# Patient Record
Sex: MALE | Race: WHITE | HISPANIC OR LATINO | Employment: OTHER | URBAN - METROPOLITAN AREA
[De-identification: names, ages, dates, MRNs, and addresses within clinical notes are randomized per-mention and may not be internally consistent; named-entity substitution may affect disease eponyms.]

---

## 2017-01-04 ENCOUNTER — TELEPHONE (OUTPATIENT)
Dept: GASTROENTEROLOGY | Facility: CLINIC | Age: 62
End: 2017-01-04

## 2017-01-04 NOTE — TELEPHONE ENCOUNTER
----- Message from Torsten Hernández sent at 1/4/2017  8:51 AM CST -----  Contact: Yvonne/Ada Russell called in regarding the attached patient and some additional information she needs regarding the patients surgery that was done on 12/26/16.    Yvonne needs to know if this was inpatient or outpatient, diagnoses, ICD-10 code and procedure and CPT code.    Yvonne's call back number is [phone] [facility] [address]

## 2017-01-05 ENCOUNTER — DOCUMENTATION ONLY (OUTPATIENT)
Dept: FAMILY MEDICINE | Facility: CLINIC | Age: 62
End: 2017-01-05

## 2017-01-05 NOTE — PROGRESS NOTES
Pre-Visit Chart Review  For Appointment Scheduled on 1-6-17    Health Maintenance Due   Topic Date Due    Influenza Vaccine  08/01/2016

## 2017-01-06 ENCOUNTER — CLINICAL SUPPORT (OUTPATIENT)
Dept: INTERNAL MEDICINE | Facility: CLINIC | Age: 62
End: 2017-01-06
Payer: COMMERCIAL

## 2017-01-06 ENCOUNTER — OFFICE VISIT (OUTPATIENT)
Dept: FAMILY MEDICINE | Facility: CLINIC | Age: 62
End: 2017-01-06
Payer: COMMERCIAL

## 2017-01-06 ENCOUNTER — LAB VISIT (OUTPATIENT)
Dept: LAB | Facility: HOSPITAL | Age: 62
End: 2017-01-06
Attending: FAMILY MEDICINE
Payer: COMMERCIAL

## 2017-01-06 VITALS
TEMPERATURE: 98 F | BODY MASS INDEX: 30.87 KG/M2 | RESPIRATION RATE: 24 BRPM | WEIGHT: 240.5 LBS | HEART RATE: 80 BPM | OXYGEN SATURATION: 96 % | HEIGHT: 74 IN | DIASTOLIC BLOOD PRESSURE: 76 MMHG | SYSTOLIC BLOOD PRESSURE: 113 MMHG

## 2017-01-06 DIAGNOSIS — J06.9 UPPER RESPIRATORY TRACT INFECTION, UNSPECIFIED TYPE: ICD-10-CM

## 2017-01-06 DIAGNOSIS — K75.9 HEPATITIS: ICD-10-CM

## 2017-01-06 DIAGNOSIS — K83.09 CHOLANGITIS: ICD-10-CM

## 2017-01-06 DIAGNOSIS — K80.50 CHOLEDOCHOLITHIASIS: ICD-10-CM

## 2017-01-06 DIAGNOSIS — A41.51 SEPSIS DUE TO ESCHERICHIA COLI: ICD-10-CM

## 2017-01-06 DIAGNOSIS — K80.50 CHOLEDOCHOLITHIASIS: Primary | ICD-10-CM

## 2017-01-06 LAB
ALBUMIN SERPL BCP-MCNC: 3.7 G/DL
ALP SERPL-CCNC: 78 U/L
ALT SERPL W/O P-5'-P-CCNC: 135 U/L
AST SERPL-CCNC: 71 U/L
BASOPHILS # BLD AUTO: 0.01 K/UL
BASOPHILS NFR BLD: 0.2 %
BILIRUB DIRECT SERPL-MCNC: 0.4 MG/DL
BILIRUB SERPL-MCNC: 0.6 MG/DL
DIFFERENTIAL METHOD: ABNORMAL
EOSINOPHIL # BLD AUTO: 0.1 K/UL
EOSINOPHIL NFR BLD: 1.8 %
ERYTHROCYTE [DISTWIDTH] IN BLOOD BY AUTOMATED COUNT: 14.6 %
HCT VFR BLD AUTO: 45.2 %
HGB BLD-MCNC: 15.4 G/DL
LYMPHOCYTES # BLD AUTO: 2.6 K/UL
LYMPHOCYTES NFR BLD: 57.1 %
MCH RBC QN AUTO: 29.3 PG
MCHC RBC AUTO-ENTMCNC: 34.1 %
MCV RBC AUTO: 86 FL
MONOCYTES # BLD AUTO: 0.4 K/UL
MONOCYTES NFR BLD: 8.1 %
NEUTROPHILS # BLD AUTO: 1.5 K/UL
NEUTROPHILS NFR BLD: 32.8 %
PLATELET # BLD AUTO: 245 K/UL
PMV BLD AUTO: 10.2 FL
PROT SERPL-MCNC: 7.5 G/DL
RBC # BLD AUTO: 5.26 M/UL
WBC # BLD AUTO: 4.55 K/UL

## 2017-01-06 PROCEDURE — 36415 COLL VENOUS BLD VENIPUNCTURE: CPT | Mod: PO

## 2017-01-06 PROCEDURE — 99214 OFFICE O/P EST MOD 30 MIN: CPT | Mod: S$GLB,,, | Performed by: FAMILY MEDICINE

## 2017-01-06 PROCEDURE — 80076 HEPATIC FUNCTION PANEL: CPT

## 2017-01-06 PROCEDURE — 99999 PR PBB SHADOW E&M-EST. PATIENT-LVL III: CPT | Mod: PBBFAC,,, | Performed by: FAMILY MEDICINE

## 2017-01-06 PROCEDURE — 96372 THER/PROPH/DIAG INJ SC/IM: CPT | Mod: S$GLB,,, | Performed by: FAMILY MEDICINE

## 2017-01-06 PROCEDURE — 85025 COMPLETE CBC W/AUTO DIFF WBC: CPT

## 2017-01-06 PROCEDURE — 1159F MED LIST DOCD IN RCRD: CPT | Mod: S$GLB,,, | Performed by: FAMILY MEDICINE

## 2017-01-06 RX ORDER — DOXYCYCLINE HYCLATE 100 MG
100 TABLET ORAL 2 TIMES DAILY
Qty: 20 TABLET | Refills: 0 | Status: SHIPPED | OUTPATIENT
Start: 2017-01-06 | End: 2017-01-16

## 2017-01-06 RX ORDER — FLUTICASONE PROPIONATE 50 MCG
4 SPRAY, SUSPENSION (ML) NASAL DAILY PRN
COMMUNITY
Start: 2016-12-09 | End: 2017-10-06 | Stop reason: SDUPTHER

## 2017-01-06 RX ADMIN — CYANOCOBALAMIN 1000 MCG: 1000 INJECTION, SOLUTION INTRAMUSCULAR; SUBCUTANEOUS at 08:01

## 2017-01-06 NOTE — PROGRESS NOTES
Patient identified by name and  With verbal feedback.  Vitamin B12, 1000 mcg administered on L  Deltoid IM using aseptic technique.  Patient tolerated well, no adverse reactions noted. /mp

## 2017-01-06 NOTE — PROGRESS NOTES
Subjective:       Patient ID: Ad Lyons is a 61 y.o. male.    Chief Complaint: Transitional Care    HPI Comments: 61 year old male in for follow up of a brief hospital stay when he presented with several days of nausea, vomiting, fever and abdominal pain.  He was admitted December 26-28 at [facility] and found to have choledocholithiasis and cholangitis with acute hepatitis and sepsis with positive blood culture for E.coli some 12 years after cholecystectomy.  The multiple stones were removed via ERCP, he was treated with IV zosyn and transitioned to po cipro and is here for follow up.  In the interim he caught a cold from his wife who developed bronchitis and was treated with doxycycline.  He has head and chest congestion and a cough with clear/white sputum.  He has no fever or chills.  He is leaving on a one week trip to [place] in three days.    Past Medical History:    Mills's esophagus                                           DDD (degenerative disc disease), cervical                     Ear ringing                                                   Gallbladder disease                                           Gastric ulcer with perforation                                GERD (gastroesophageal reflux disease)                        Kidney stone                                                  Vertigo                                                       Past Surgical History:    CHOLECYSTECTOMY                                                VASECTOMY                                                      ADENOIDECTOMY                                                  TONSILLECTOMY                                                  ESOPHAGOGASTRODUODENOSCOPY                       1/2013          Comment:Dr Hughes    COLONOSCOPY W/ POLYPECTOMY                       1/2013          Comment:Dr Hughes    ERCP                                             12/26/2016      Comment:[facility]  note        Review of Systems   Constitutional: Negative for chills, diaphoresis and fever.   HENT: Positive for congestion, postnasal drip, rhinorrhea and sinus pressure. Negative for ear discharge and ear pain.    Respiratory: Positive for cough. Negative for chest tightness and shortness of breath.    Cardiovascular: Negative for chest pain and palpitations.   Gastrointestinal: Negative for abdominal distention, abdominal pain, anal bleeding, blood in stool, constipation, diarrhea, nausea and vomiting.       Objective:      Physical Exam   Constitutional: He is oriented to person, place, and time. He appears well-developed. No distress.   Good blood pressure control  No fever   HENT:   Head: Normocephalic and atraumatic.   Right Ear: Hearing, external ear and ear canal normal. Tympanic membrane is bulging. Tympanic membrane mobility is abnormal.   Left Ear: Hearing, external ear and ear canal normal. Tympanic membrane is injected, erythematous and bulging. Tympanic membrane mobility is abnormal.   Nose: Mucosal edema and rhinorrhea present.   Mouth/Throat: No oropharyngeal exudate, posterior oropharyngeal edema, posterior oropharyngeal erythema or tonsillar abscesses.   Cardiovascular: Normal rate, regular rhythm and normal heart sounds.  Exam reveals no gallop and no friction rub.    No murmur heard.  Pulmonary/Chest: Effort normal and breath sounds normal. No respiratory distress. He has no wheezes. He has no rales. He exhibits no tenderness.   Abdominal: Soft. Bowel sounds are normal. He exhibits no distension and no mass. There is no hepatosplenomegaly. There is no tenderness. There is no rebound, no guarding, no tenderness at McBurney's point and negative Huertas's sign. No hernia.   Neurological: He is alert and oriented to person, place, and time.   Skin: He is not diaphoretic.   Psychiatric: He has a normal mood and affect. His behavior is normal. Judgment and thought content normal.   Nursing note and  vitals reviewed.      Assessment:       1. Choledocholithiasis    2. Hepatitis    3. Sepsis due to Escherichia coli    4. Upper respiratory tract infection, unspecified type    5. Cholangitis        Plan:       1. Choledocholithiasis  Appears resolved pending lab  - CBC auto differential; Future  - Hepatic function panel; Future    2. Hepatitis  Appears resolved pending lab  - CBC auto differential; Future  - Hepatic function panel; Future    3. Sepsis due to Escherichia coli  Sensitive to all antibiotics tested including cipro  - CBC auto differential; Future  - Hepatic function panel; Future    4. Upper respiratory tract infection, unspecified type  Most likely viral but will be traveling on extended trip, will cover with doxycycline  - doxycycline (VIBRA-TABS) 100 MG tablet; Take 1 tablet (100 mg total) by mouth 2 (two) times daily.  Dispense: 20 tablet; Refill: 0    5. Cholangitis  Appears resolved pending lab, avoid antihistamines

## 2017-01-06 NOTE — PATIENT INSTRUCTIONS
Walking for Fitness  Fitness walking has something for everyone, even people who are already fit. Walking is one of the safest ways to condition your body aerobically. It can boost energy, help you lose weight, and reduce stress.    Physical benefits  · Walking strengthens your heart and lungs, and tones your muscles.  · When walking, your feet land with less impact than in other sports. This reduces chances of muscle, bone, and joint injury.  · Regular walking improves your cholesterol levels and lowers your risk of heart disease. And it helps you control your blood sugar if you have diabetes.  · Walking is a weight-bearing activity, which helps maintain bone density. This can help prevent osteoporosis.  Personal rewards  · Taking walks can help you relax and manage stress. And fitness walking may make you feel better about yourself.  · Walking can help you sleep better at night and make you less likely to be depressed.  · Regular walking may help maintain your memory as you get older.  · Walking is a great way to spend extra time with friends and family members. Be sure to invite your dog along!  Q&A about fitness walking  Q: Will walking keep me fit?  A: Yes. Regular walking at the right pace gives you all the benefits of other aerobic activities, such as jogging and swimming.  Q: Will walking help me lose weight and keep it off?  A: Yes. Per mile, walking can burn as many calories as jogging. Your health care provider can help work walking into your weight-loss plan.  Q: Is walking safe for my health?  A: Yes. Walking is safe if you have high blood pressure, diabetes, heart disease, or other conditions. Talk to your health care provider before you start.  © 9131-2422 [employer]. [address] [place] [address], [place] [address]. All rights reserved. This information is not intended as a substitute for professional medical care. Always follow your healthcare professional's instructions.

## 2017-01-10 ENCOUNTER — PATIENT MESSAGE (OUTPATIENT)
Dept: FAMILY MEDICINE | Facility: CLINIC | Age: 62
End: 2017-01-10

## 2017-01-26 ENCOUNTER — LAB VISIT (OUTPATIENT)
Dept: LAB | Facility: HOSPITAL | Age: 62
End: 2017-01-26
Attending: INTERNAL MEDICINE
Payer: COMMERCIAL

## 2017-01-26 ENCOUNTER — OFFICE VISIT (OUTPATIENT)
Dept: GASTROENTEROLOGY | Facility: CLINIC | Age: 62
End: 2017-01-26
Payer: COMMERCIAL

## 2017-01-26 VITALS — WEIGHT: 244.69 LBS | HEIGHT: 74 IN | BODY MASS INDEX: 31.4 KG/M2

## 2017-01-26 DIAGNOSIS — R79.89 ABNORMAL LFTS: Primary | ICD-10-CM

## 2017-01-26 DIAGNOSIS — R79.89 ABNORMAL LFTS: ICD-10-CM

## 2017-01-26 LAB
ALBUMIN SERPL BCP-MCNC: 4.1 G/DL
ALP SERPL-CCNC: 72 U/L
ALT SERPL W/O P-5'-P-CCNC: 48 U/L
AST SERPL-CCNC: 28 U/L
BILIRUB DIRECT SERPL-MCNC: 0.3 MG/DL
BILIRUB SERPL-MCNC: 0.6 MG/DL
PROT SERPL-MCNC: 7.5 G/DL

## 2017-01-26 PROCEDURE — 99999 PR PBB SHADOW E&M-EST. PATIENT-LVL II: CPT | Mod: PBBFAC,,, | Performed by: INTERNAL MEDICINE

## 2017-01-26 PROCEDURE — 80076 HEPATIC FUNCTION PANEL: CPT

## 2017-01-26 PROCEDURE — 36415 COLL VENOUS BLD VENIPUNCTURE: CPT | Mod: PO

## 2017-01-26 PROCEDURE — 99213 OFFICE O/P EST LOW 20 MIN: CPT | Mod: S$GLB,,, | Performed by: INTERNAL MEDICINE

## 2017-01-26 PROCEDURE — 1159F MED LIST DOCD IN RCRD: CPT | Mod: S$GLB,,, | Performed by: INTERNAL MEDICINE

## 2017-01-26 NOTE — PROGRESS NOTES
"Pt presents s/p recent hospitalization for ERCP with stone extraction. Pt doing well. Positive intermittent vague non-specific "dyspeptic-type" symptoms. No N/V. No fever or jaundice. No bleeding. Good appetite and increasing weight. LFT's have improved significantly since procedure, however liver enzymes still mildy elevated (bilirubin normal).    REVIEW OF SYSTEMS:   Constitutional: Negative for fever, appetite change and unexpected weight change.  HENT: Negative for sore throat and trouble swallowing.  Eyes: Negative for visual disturbance.  Respiratory: Negative for chest tightness, shortness of breath and wheezing.  Cardiovascular: Negative for chest pain.  Gastrointestinal:  as per HPI    PHYSICAL EXAMINATION:                                                        GENERAL:  Comfortable, in no acute distress.      SKIN: Non-jaundiced.                             HEENT EXAM:  Nonicteric.  No adenopathy.  Oropharynx is clear.               NECK:  Supple.                                                               LUNGS:  Clear.                                                               CARDIAC:  Regular rate and rhythm.  S1, S2.  No murmur.                      ABDOMEN:  Soft, positive bowel sounds, nontender.  No hepatosplenomegaly or masses.  No rebound or guarding.                                             EXTREMITIES:  No edema.       IMP: Choledocholithiasis - s/p ERCP with sphx/stone extraction (post lex years ago). Doing well. Of note, pt had EGD in 2016 notable for Mills's and gastritis, currently taking omeprazole.    PLAN: Repeat LFT's today.  "

## 2017-02-03 ENCOUNTER — CLINICAL SUPPORT (OUTPATIENT)
Dept: INTERNAL MEDICINE | Facility: CLINIC | Age: 62
End: 2017-02-03
Payer: COMMERCIAL

## 2017-02-03 DIAGNOSIS — E53.8 B12 DEFICIENCY: Primary | ICD-10-CM

## 2017-02-03 PROCEDURE — 96372 THER/PROPH/DIAG INJ SC/IM: CPT | Mod: S$GLB,,, | Performed by: FAMILY MEDICINE

## 2017-02-03 RX ADMIN — CYANOCOBALAMIN 1000 MCG: 1000 INJECTION, SOLUTION INTRAMUSCULAR; SUBCUTANEOUS at 09:02

## 2017-02-03 NOTE — PROGRESS NOTES
Patient identified by name and  With verbal feedback.  Vitamin B12, 1000 mcg administered on R Deltoid IM using aseptic technique.  Patient tolerated well, waited 15 min.  no adverse reactions noted.  Patient to be back in 30 days.     Needs new orders. /mp

## 2017-02-17 ENCOUNTER — LAB VISIT (OUTPATIENT)
Dept: LAB | Facility: HOSPITAL | Age: 62
End: 2017-02-17
Attending: FAMILY MEDICINE
Payer: COMMERCIAL

## 2017-02-17 ENCOUNTER — PATIENT MESSAGE (OUTPATIENT)
Dept: FAMILY MEDICINE | Facility: CLINIC | Age: 62
End: 2017-02-17

## 2017-02-17 DIAGNOSIS — R79.89 ELEVATED LFTS: ICD-10-CM

## 2017-02-17 LAB
ALBUMIN SERPL BCP-MCNC: 4 G/DL
ALP SERPL-CCNC: 64 U/L
ALT SERPL W/O P-5'-P-CCNC: 43 U/L
AST SERPL-CCNC: 27 U/L
BILIRUB DIRECT SERPL-MCNC: 0.2 MG/DL
BILIRUB SERPL-MCNC: 0.7 MG/DL
PROT SERPL-MCNC: 7.2 G/DL

## 2017-02-17 PROCEDURE — 36415 COLL VENOUS BLD VENIPUNCTURE: CPT | Mod: PO

## 2017-02-17 PROCEDURE — 80076 HEPATIC FUNCTION PANEL: CPT

## 2017-05-26 DIAGNOSIS — K21.9 GASTROESOPHAGEAL REFLUX DISEASE, ESOPHAGITIS PRESENCE NOT SPECIFIED: ICD-10-CM

## 2017-05-26 RX ORDER — OMEPRAZOLE 40 MG/1
40 CAPSULE, DELAYED RELEASE ORAL DAILY
Qty: 90 CAPSULE | Refills: 2 | Status: SHIPPED | OUTPATIENT
Start: 2017-05-26 | End: 2018-06-09 | Stop reason: SDUPTHER

## 2017-06-02 DIAGNOSIS — R42 VERTIGO: ICD-10-CM

## 2017-06-02 RX ORDER — ONDANSETRON 8 MG/1
TABLET, ORALLY DISINTEGRATING ORAL
Qty: 15 TABLET | Refills: 0 | OUTPATIENT
Start: 2017-06-02

## 2017-08-30 ENCOUNTER — HOSPITAL ENCOUNTER (OUTPATIENT)
Facility: HOSPITAL | Age: 62
Discharge: HOME | End: 2017-08-31
Attending: EMERGENCY MEDICINE | Admitting: HOSPITALIST
Payer: MEDICAID

## 2017-08-30 ENCOUNTER — NURSE TRIAGE (OUTPATIENT)
Dept: ADMINISTRATIVE | Facility: CLINIC | Age: 62
End: 2017-08-30

## 2017-08-30 DIAGNOSIS — K92.2 ACUTE UPPER GI BLEED: Primary | ICD-10-CM

## 2017-08-30 DIAGNOSIS — K92.0 HEMATEMESIS WITH NAUSEA: ICD-10-CM

## 2017-08-30 LAB
ALBUMIN SERPL BCP-MCNC: 4.2 G/DL
ALP SERPL-CCNC: 75 U/L
ALT SERPL W/O P-5'-P-CCNC: 43 U/L
ANION GAP SERPL CALC-SCNC: 10 MMOL/L
AST SERPL-CCNC: 21 U/L
BASOPHILS # BLD AUTO: 0 K/UL
BASOPHILS NFR BLD: 0.3 %
BILIRUB SERPL-MCNC: 0.9 MG/DL
BUN SERPL-MCNC: 13 MG/DL
CALCIUM SERPL-MCNC: 9.4 MG/DL
CHLORIDE SERPL-SCNC: 102 MMOL/L
CO2 SERPL-SCNC: 23 MMOL/L
CREAT SERPL-MCNC: 0.9 MG/DL
DIFFERENTIAL METHOD: ABNORMAL
EOSINOPHIL # BLD AUTO: 0.1 K/UL
EOSINOPHIL NFR BLD: 0.5 %
ERYTHROCYTE [DISTWIDTH] IN BLOOD BY AUTOMATED COUNT: 14.2 %
EST. GFR  (AFRICAN AMERICAN): >60 ML/MIN/1.73 M^2
EST. GFR  (NON AFRICAN AMERICAN): >60 ML/MIN/1.73 M^2
GLUCOSE SERPL-MCNC: 126 MG/DL
HCT VFR BLD AUTO: 47.4 %
HGB BLD-MCNC: 15.8 G/DL
LYMPHOCYTES # BLD AUTO: 2.1 K/UL
LYMPHOCYTES NFR BLD: 18.4 %
MCH RBC QN AUTO: 28.8 PG
MCHC RBC AUTO-ENTMCNC: 33.4 G/DL
MCV RBC AUTO: 86 FL
MONOCYTES # BLD AUTO: 0.4 K/UL
MONOCYTES NFR BLD: 3.6 %
NEUTROPHILS # BLD AUTO: 9 K/UL
NEUTROPHILS NFR BLD: 77.2 %
PLATELET # BLD AUTO: 232 K/UL
PMV BLD AUTO: 7.9 FL
POTASSIUM SERPL-SCNC: 3.8 MMOL/L
PROT SERPL-MCNC: 7.7 G/DL
RBC # BLD AUTO: 5.5 M/UL
SODIUM SERPL-SCNC: 135 MMOL/L
WBC # BLD AUTO: 11.6 K/UL

## 2017-08-30 PROCEDURE — 86901 BLOOD TYPING SEROLOGIC RH(D): CPT

## 2017-08-30 PROCEDURE — 99285 EMERGENCY DEPT VISIT HI MDM: CPT | Mod: 25

## 2017-08-30 PROCEDURE — 85025 COMPLETE CBC W/AUTO DIFF WBC: CPT

## 2017-08-30 PROCEDURE — 86900 BLOOD TYPING SEROLOGIC ABO: CPT

## 2017-08-30 PROCEDURE — 25000003 PHARM REV CODE 250: Performed by: EMERGENCY MEDICINE

## 2017-08-30 PROCEDURE — 83690 ASSAY OF LIPASE: CPT

## 2017-08-30 PROCEDURE — 85610 PROTHROMBIN TIME: CPT

## 2017-08-30 PROCEDURE — 93005 ELECTROCARDIOGRAM TRACING: CPT

## 2017-08-30 PROCEDURE — 36415 COLL VENOUS BLD VENIPUNCTURE: CPT

## 2017-08-30 PROCEDURE — 96361 HYDRATE IV INFUSION ADD-ON: CPT

## 2017-08-30 PROCEDURE — 80053 COMPREHEN METABOLIC PANEL: CPT

## 2017-08-30 RX ORDER — PROMETHAZINE HYDROCHLORIDE 25 MG/ML
INJECTION, SOLUTION INTRAMUSCULAR; INTRAVENOUS
Status: COMPLETED
Start: 2017-08-30 | End: 2017-08-31

## 2017-08-30 RX ORDER — PANTOPRAZOLE SODIUM 40 MG/10ML
80 INJECTION, POWDER, LYOPHILIZED, FOR SOLUTION INTRAVENOUS
Status: COMPLETED | OUTPATIENT
Start: 2017-08-30 | End: 2017-08-31

## 2017-08-30 RX ADMIN — SODIUM CHLORIDE 1000 ML: 0.9 INJECTION, SOLUTION INTRAVENOUS at 11:08

## 2017-08-31 ENCOUNTER — ANESTHESIA EVENT (OUTPATIENT)
Dept: ENDOSCOPY | Facility: HOSPITAL | Age: 62
End: 2017-08-31
Payer: MEDICAID

## 2017-08-31 ENCOUNTER — ANESTHESIA (OUTPATIENT)
Dept: ENDOSCOPY | Facility: HOSPITAL | Age: 62
End: 2017-08-31
Payer: MEDICAID

## 2017-08-31 VITALS
HEART RATE: 76 BPM | WEIGHT: 240 LBS | BODY MASS INDEX: 30.8 KG/M2 | HEIGHT: 74 IN | RESPIRATION RATE: 16 BRPM | TEMPERATURE: 98 F | SYSTOLIC BLOOD PRESSURE: 119 MMHG | OXYGEN SATURATION: 98 % | DIASTOLIC BLOOD PRESSURE: 70 MMHG

## 2017-08-31 VITALS — RESPIRATION RATE: 42 BRPM

## 2017-08-31 PROBLEM — K92.2 ACUTE UPPER GI BLEED: Status: ACTIVE | Noted: 2017-08-31

## 2017-08-31 PROBLEM — K92.0 HEMATEMESIS WITH NAUSEA: Status: ACTIVE | Noted: 2017-08-31

## 2017-08-31 LAB
ABO + RH BLD: NORMAL
ANION GAP SERPL CALC-SCNC: 8 MMOL/L
BASOPHILS # BLD AUTO: 0 K/UL
BASOPHILS NFR BLD: 0.4 %
BLD GP AB SCN CELLS X3 SERPL QL: NORMAL
BUN SERPL-MCNC: 11 MG/DL
CALCIUM SERPL-MCNC: 8.7 MG/DL
CHLORIDE SERPL-SCNC: 106 MMOL/L
CO2 SERPL-SCNC: 25 MMOL/L
CREAT SERPL-MCNC: 0.8 MG/DL
DIFFERENTIAL METHOD: ABNORMAL
EOSINOPHIL # BLD AUTO: 0 K/UL
EOSINOPHIL NFR BLD: 0.4 %
ERYTHROCYTE [DISTWIDTH] IN BLOOD BY AUTOMATED COUNT: 14.3 %
EST. GFR  (AFRICAN AMERICAN): >60 ML/MIN/1.73 M^2
EST. GFR  (NON AFRICAN AMERICAN): >60 ML/MIN/1.73 M^2
GLUCOSE SERPL-MCNC: 105 MG/DL
HCT VFR BLD AUTO: 41.9 %
HGB BLD-MCNC: 14.2 G/DL
INR PPP: 1
LIPASE SERPL-CCNC: 16 U/L
LYMPHOCYTES # BLD AUTO: 2.2 K/UL
LYMPHOCYTES NFR BLD: 21.6 %
MCH RBC QN AUTO: 29.2 PG
MCHC RBC AUTO-ENTMCNC: 33.9 G/DL
MCV RBC AUTO: 86 FL
MONOCYTES # BLD AUTO: 0.7 K/UL
MONOCYTES NFR BLD: 6.7 %
NEUTROPHILS # BLD AUTO: 7.2 K/UL
NEUTROPHILS NFR BLD: 70.9 %
PLATELET # BLD AUTO: 212 K/UL
PMV BLD AUTO: 7.9 FL
POTASSIUM SERPL-SCNC: 3.7 MMOL/L
PROTHROMBIN TIME: 10.7 SEC
RBC # BLD AUTO: 4.86 M/UL
SODIUM SERPL-SCNC: 139 MMOL/L
WBC # BLD AUTO: 10.2 K/UL

## 2017-08-31 PROCEDURE — 63600175 PHARM REV CODE 636 W HCPCS: Performed by: EMERGENCY MEDICINE

## 2017-08-31 PROCEDURE — C9113 INJ PANTOPRAZOLE SODIUM, VIA: HCPCS | Performed by: EMERGENCY MEDICINE

## 2017-08-31 PROCEDURE — 43239 EGD BIOPSY SINGLE/MULTIPLE: CPT | Mod: ,,, | Performed by: INTERNAL MEDICINE

## 2017-08-31 PROCEDURE — 96375 TX/PRO/DX INJ NEW DRUG ADDON: CPT

## 2017-08-31 PROCEDURE — G0378 HOSPITAL OBSERVATION PER HR: HCPCS

## 2017-08-31 PROCEDURE — 80048 BASIC METABOLIC PNL TOTAL CA: CPT

## 2017-08-31 PROCEDURE — 36415 COLL VENOUS BLD VENIPUNCTURE: CPT

## 2017-08-31 PROCEDURE — 25000003 PHARM REV CODE 250: Performed by: EMERGENCY MEDICINE

## 2017-08-31 PROCEDURE — 63600175 PHARM REV CODE 636 W HCPCS: Performed by: NURSE ANESTHETIST, CERTIFIED REGISTERED

## 2017-08-31 PROCEDURE — 88342 IMHCHEM/IMCYTCHM 1ST ANTB: CPT | Mod: 26,,, | Performed by: PATHOLOGY

## 2017-08-31 PROCEDURE — 43239 EGD BIOPSY SINGLE/MULTIPLE: CPT | Performed by: INTERNAL MEDICINE

## 2017-08-31 PROCEDURE — 99214 OFFICE O/P EST MOD 30 MIN: CPT | Mod: 25,,, | Performed by: INTERNAL MEDICINE

## 2017-08-31 PROCEDURE — D9220A PRA ANESTHESIA: Mod: ANES,,, | Performed by: ANESTHESIOLOGY

## 2017-08-31 PROCEDURE — 96365 THER/PROPH/DIAG IV INF INIT: CPT

## 2017-08-31 PROCEDURE — 96376 TX/PRO/DX INJ SAME DRUG ADON: CPT

## 2017-08-31 PROCEDURE — 96368 THER/DIAG CONCURRENT INF: CPT

## 2017-08-31 PROCEDURE — 88312 SPECIAL STAINS GROUP 1: CPT | Mod: 26,,, | Performed by: PATHOLOGY

## 2017-08-31 PROCEDURE — 85025 COMPLETE CBC W/AUTO DIFF WBC: CPT

## 2017-08-31 PROCEDURE — 25000003 PHARM REV CODE 250: Performed by: NURSE PRACTITIONER

## 2017-08-31 PROCEDURE — 27201012 HC FORCEPS, HOT/COLD, DISP: Performed by: INTERNAL MEDICINE

## 2017-08-31 PROCEDURE — 88305 TISSUE EXAM BY PATHOLOGIST: CPT | Performed by: PATHOLOGY

## 2017-08-31 PROCEDURE — 96374 THER/PROPH/DIAG INJ IV PUSH: CPT | Mod: 59

## 2017-08-31 PROCEDURE — 37000008 HC ANESTHESIA 1ST 15 MINUTES: Performed by: INTERNAL MEDICINE

## 2017-08-31 PROCEDURE — 96361 HYDRATE IV INFUSION ADD-ON: CPT

## 2017-08-31 PROCEDURE — 63600175 PHARM REV CODE 636 W HCPCS

## 2017-08-31 PROCEDURE — D9220A PRA ANESTHESIA: Mod: CRNA,,, | Performed by: NURSE ANESTHETIST, CERTIFIED REGISTERED

## 2017-08-31 RX ORDER — MORPHINE SULFATE 4 MG/ML
4 INJECTION, SOLUTION INTRAMUSCULAR; INTRAVENOUS EVERY 4 HOURS PRN
Status: DISCONTINUED | OUTPATIENT
Start: 2017-08-31 | End: 2017-08-31 | Stop reason: HOSPADM

## 2017-08-31 RX ORDER — ONDANSETRON 2 MG/ML
4 INJECTION INTRAMUSCULAR; INTRAVENOUS EVERY 6 HOURS PRN
Status: DISCONTINUED | OUTPATIENT
Start: 2017-08-31 | End: 2017-08-31 | Stop reason: HOSPADM

## 2017-08-31 RX ORDER — LIDOCAINE HYDROCHLORIDE 10 MG/ML
INJECTION, SOLUTION EPIDURAL; INFILTRATION; INTRACAUDAL; PERINEURAL
Status: DISCONTINUED
Start: 2017-08-31 | End: 2017-08-31 | Stop reason: HOSPADM

## 2017-08-31 RX ORDER — LIDOCAINE HCL/PF 100 MG/5ML
SYRINGE (ML) INTRAVENOUS
Status: DISCONTINUED | OUTPATIENT
Start: 2017-08-31 | End: 2017-08-31

## 2017-08-31 RX ORDER — ACETAMINOPHEN 325 MG/1
650 TABLET ORAL EVERY 6 HOURS PRN
Status: DISCONTINUED | OUTPATIENT
Start: 2017-08-31 | End: 2017-08-31 | Stop reason: HOSPADM

## 2017-08-31 RX ORDER — ZOLPIDEM TARTRATE 5 MG/1
5 TABLET ORAL NIGHTLY PRN
Status: DISCONTINUED | OUTPATIENT
Start: 2017-08-31 | End: 2017-08-31 | Stop reason: HOSPADM

## 2017-08-31 RX ORDER — PROPOFOL 10 MG/ML
VIAL (ML) INTRAVENOUS
Status: DISCONTINUED | OUTPATIENT
Start: 2017-08-31 | End: 2017-08-31

## 2017-08-31 RX ORDER — SUCRALFATE 1 G/10ML
1 SUSPENSION ORAL EVERY 6 HOURS
Status: DISCONTINUED | OUTPATIENT
Start: 2017-08-31 | End: 2017-08-31 | Stop reason: HOSPADM

## 2017-08-31 RX ORDER — PROPOFOL 10 MG/ML
INJECTION, EMULSION INTRAVENOUS
Status: DISCONTINUED
Start: 2017-08-31 | End: 2017-08-31 | Stop reason: HOSPADM

## 2017-08-31 RX ORDER — PANTOPRAZOLE SODIUM 40 MG/1
40 TABLET, DELAYED RELEASE ORAL DAILY
Qty: 30 TABLET | Refills: 0 | Status: SHIPPED | OUTPATIENT
Start: 2017-08-31 | End: 2017-08-31

## 2017-08-31 RX ORDER — DICYCLOMINE HYDROCHLORIDE 10 MG/ML
20 INJECTION INTRAMUSCULAR EVERY 6 HOURS PRN
Status: DISCONTINUED | OUTPATIENT
Start: 2017-08-31 | End: 2017-08-31 | Stop reason: HOSPADM

## 2017-08-31 RX ORDER — DIPHENHYDRAMINE HYDROCHLORIDE 50 MG/ML
25 INJECTION INTRAMUSCULAR; INTRAVENOUS EVERY 6 HOURS PRN
Status: DISCONTINUED | OUTPATIENT
Start: 2017-08-31 | End: 2017-08-31 | Stop reason: HOSPADM

## 2017-08-31 RX ORDER — SUCRALFATE 1 G/10ML
1 SUSPENSION ORAL EVERY 6 HOURS
Qty: 560 ML | Refills: 0 | Status: SHIPPED | OUTPATIENT
Start: 2017-08-31 | End: 2017-09-14

## 2017-08-31 RX ORDER — PANTOPRAZOLE SODIUM 40 MG/1
40 TABLET, DELAYED RELEASE ORAL DAILY
Qty: 30 TABLET | Refills: 0 | Status: SHIPPED | OUTPATIENT
Start: 2017-08-31 | End: 2017-09-20 | Stop reason: ALTCHOICE

## 2017-08-31 RX ORDER — PANTOPRAZOLE SODIUM 40 MG/1
40 TABLET, DELAYED RELEASE ORAL DAILY
Status: DISCONTINUED | OUTPATIENT
Start: 2017-08-31 | End: 2017-08-31 | Stop reason: HOSPADM

## 2017-08-31 RX ORDER — SODIUM CHLORIDE 9 MG/ML
INJECTION, SOLUTION INTRAVENOUS CONTINUOUS
Status: DISCONTINUED | OUTPATIENT
Start: 2017-08-31 | End: 2017-08-31 | Stop reason: HOSPADM

## 2017-08-31 RX ORDER — MORPHINE SULFATE 2 MG/ML
2 INJECTION, SOLUTION INTRAMUSCULAR; INTRAVENOUS EVERY 4 HOURS PRN
Status: DISCONTINUED | OUTPATIENT
Start: 2017-08-31 | End: 2017-08-31 | Stop reason: HOSPADM

## 2017-08-31 RX ADMIN — PROPOFOL 100 MG: 10 INJECTION, EMULSION INTRAVENOUS at 08:08

## 2017-08-31 RX ADMIN — PROMETHAZINE HYDROCHLORIDE 25 MG: 25 INJECTION INTRAMUSCULAR; INTRAVENOUS at 12:08

## 2017-08-31 RX ADMIN — LIDOCAINE HYDROCHLORIDE 100 MG: 20 INJECTION, SOLUTION INTRAVENOUS at 08:08

## 2017-08-31 RX ADMIN — LIDOCAINE HYDROCHLORIDE: 20 SOLUTION ORAL; TOPICAL at 12:08

## 2017-08-31 RX ADMIN — SUCRALFATE 1 G: 1 SUSPENSION ORAL at 11:08

## 2017-08-31 RX ADMIN — PANTOPRAZOLE SODIUM 40 MG: 40 TABLET, DELAYED RELEASE ORAL at 11:08

## 2017-08-31 RX ADMIN — PANTOPRAZOLE SODIUM 80 MG: 40 INJECTION, POWDER, FOR SOLUTION INTRAVENOUS at 12:08

## 2017-08-31 RX ADMIN — PANTOPRAZOLE SODIUM 8 MG/HR: 40 INJECTION, POWDER, FOR SOLUTION INTRAVENOUS at 12:08

## 2017-08-31 RX ADMIN — PROPOFOL 50 MG: 10 INJECTION, EMULSION INTRAVENOUS at 08:08

## 2017-08-31 NOTE — PROGRESS NOTES
EGD done.  Esophagitis and gastritis noted and biopsied.  Presentation sounds consistent with gastroenteritis.  Recommend daily PPI.  Sucralfate x 10 days.  No bleeding or other alarm features noted.  Encourage intake of PO fluids and OK to discharge home from GI standpoint once tolerating PO.  Follow up in my office in 6 weeks.  He will need repeat EGD at some point to check healing of esophagitis and take surveillance biopsies for Mills's esophagus.  GI to sign off.  Call for questions.

## 2017-08-31 NOTE — TELEPHONE ENCOUNTER
"    Reason for Disposition   [1] Vomiting AND [2] contains red blood or black ("coffee ground") material  (Exception: few red streaks in vomit that only happened once)    Answer Assessment - Initial Assessment Questions  1. VOMITING SEVERITY: "How many times have you vomited in the past 24 hours?"      - MILD:  1 - 2 times/day     - MODERATE: 3 - 5 times/day, decreased oral intake without significant weight loss or symptoms of dehydration     - SEVERE: 6 or more times/day, vomits everything or nearly everything, with significant weight loss, symptoms of dehydration     Vomiting began about 5pm. vx1 - clear, vomiting black x1 this evening. + nauseated all day. Very strong abd pain Monday. Hx gallstones, no bad pain this evening    2. ONSET: "When did the vomiting begin?"      Ate reg diet today  3. FLUIDS: "What fluids or food have you vomited up today?" "Have you been able to keep any fluids down?"     Able to keep down diet today. Had water with meals. Gatorade.   4. ABDOMINAL PAIN: "Are your having any abdominal pain?" If yes : "How bad is it and what does it feel like?" (e.g., crampy, dull, intermittent, constant)      No   5. DIARRHEA: "Is there any diarrhea?" If so, ask: "How many times today?"     Dx1 week - common sx for pt since GB out   6. CONTACTS: "Is there anyone else in the family with the same symptoms?"      No   7. CAUSE: "What do you think is causing your vomiting?"     Liver , Hx gastric ulcers in 2012  8. HYDRATION STATUS: "Any signs of dehydration?" (e.g., dry mouth [not only dry lips], too weak to stand) "When did you last urinate?"     uop at 1000pm, dizzy at 5pm- able to walk now.   9. OTHER SYMPTOMS: "Do you have any other symptoms?" (e.g., fever, headache, vertigo, vomiting blood or coffee grounds)     Blood in vomit- tasted like blood. Same sx with ulcer    Protocols used:  VOMITING-A-Critical access hospital ED due to coffee ground emesis. pt states that he lost his job and does not want to run up a " bill at the ED. Pt requested office message be sent ot RANDA LAWTON. Office message sent at pt request. Call back with questions.

## 2017-08-31 NOTE — SUBJECTIVE & OBJECTIVE
Past Medical History:   Diagnosis Date    Mills's esophagus     DDD (degenerative disc disease), cervical     Ear ringing     Gallbladder disease     Gastric ulcer with perforation     GERD (gastroesophageal reflux disease)     Kidney stone     Vertigo        Past Surgical History:   Procedure Laterality Date    ADENOIDECTOMY      CHOLECYSTECTOMY      COLONOSCOPY W/ POLYPECTOMY  1/2013    Dr Hughes    ERCP  12/26/2016    [facility] note    ESOPHAGOGASTRODUODENOSCOPY  1/2013    Dr Hughes    TONSILLECTOMY      VASECTOMY         Review of patient's allergies indicates:   Allergen Reactions    Aspirin Other (See Comments)     Stomach ulcers       No current facility-administered medications on file prior to encounter.      Current Outpatient Prescriptions on File Prior to Encounter   Medication Sig    fluticasone (FLONASE) 50 mcg/actuation nasal spray 4 sprays by Nasal route daily as needed.     L.ACID/L.CASEI/B.BIF/B.KWAKU/FOS (PROBIOTIC BLEND ORAL) Take 1 capsule by mouth 2 (two) times daily.    meclizine (ANTIVERT) 25 mg tablet Take 1 tablet (25 mg total) by mouth 4 (four) times daily. (Patient taking differently: Take 25 mg by mouth 4 (four) times daily as needed. )    omeprazole (PRILOSEC) 40 MG capsule TAKE 1 CAPSULE (40 MG TOTAL) BY MOUTH ONCE DAILY.     Family History     Problem Relation (Age of Onset)    Cancer Brother, Sister    Heart disease Father    No Known Problems Mother, Sister, Daughter, Son, Daughter, Son        Social History Main Topics    Smoking status: Never Smoker    Smokeless tobacco: Never Used    Alcohol use No    Drug use: No    Sexual activity: Not on file     Review of Systems   Constitutional: Negative for appetite change, fatigue and fever.   Eyes: Negative for visual disturbance.   Respiratory: Negative for cough and shortness of breath.    Cardiovascular: Negative for chest pain.   Gastrointestinal: Positive for abdominal pain, nausea and vomiting.         Black tarry stool. Hematemesis.   Genitourinary: Negative for dysuria.   Musculoskeletal: Negative for arthralgias.   Skin: Negative for pallor and wound.   Neurological: Negative for dizziness, syncope and headaches.   Hematological: Does not bruise/bleed easily.   Psychiatric/Behavioral: Negative for confusion and hallucinations.     Objective:     Vital Signs (Most Recent):  Temp: 98.7 °F (37.1 °C) (08/30/17 2307)  Pulse: (!) 58 (08/31/17 0027)  Resp: 20 (08/30/17 2307)  BP: 132/77 (08/31/17 0017)  SpO2: (!) 94 % (08/31/17 0027) Vital Signs (24h Range):  Temp:  [98.7 °F (37.1 °C)] 98.7 °F (37.1 °C)  Pulse:  [58-78] 58  Resp:  [20] 20  SpO2:  [94 %-95 %] 94 %  BP: (132-176)/(77-95) 132/77     Weight: 108.9 kg (240 lb)  Body mass index is 30.81 kg/m².    Physical Exam   Constitutional: He is oriented to person, place, and time. No distress.   HENT:   Head: Normocephalic.   Eyes: Pupils are equal, round, and reactive to light.   Neck: Normal range of motion. Neck supple. No JVD present. No tracheal deviation present.   Cardiovascular: Normal rate, regular rhythm, normal heart sounds and intact distal pulses.    Pulmonary/Chest: Effort normal and breath sounds normal.   Abdominal: Soft. Bowel sounds are normal. He exhibits no distension. There is tenderness in the epigastric area. There is no guarding.   Musculoskeletal: Normal range of motion. He exhibits no edema.   Neurological: He is alert and oriented to person, place, and time. No cranial nerve deficit.   Skin: Skin is warm, dry and intact. Capillary refill takes less than 2 seconds.   Psychiatric: He has a normal mood and affect. His behavior is normal. Judgment and thought content normal.        Significant Labs:   CBC:   Recent Labs  Lab 08/30/17 2323   WBC 11.60   HGB 15.8   HCT 47.4        CMP:   Recent Labs  Lab 08/30/17 2323   *   K 3.8      CO2 23   *   BUN 13   CREATININE 0.9   CALCIUM 9.4   PROT 7.7   ALBUMIN 4.2   BILITOT  0.9   ALKPHOS 75   AST 21   ALT 43   ANIONGAP 10   EGFRNONAA >60     Coagulation:   Recent Labs  Lab 08/30/17  2323   INR 1.0     Lipase:   Recent Labs  Lab 08/30/17  2323   LIPASE 16       Significant Imaging: Abdominal Xray: Radiologist's report not downloaded.

## 2017-08-31 NOTE — CONSULTS
"[facility]     CC: Hematemesis    HPI 62 y.o. male with hematemesis, onset yesterday, occurring x 2 episodes, characterized by dark "droplets" in the vomitus, with no alleviating/exacerbating factors.  Associated signs/symptoms include epigastric pain and nausea which began 3 days ago.  He denies weight loss or dysphagia.  ER physician notes document black stool x 1 but patient denies.  H/H stable.  EGD from last year reviewed and he has a history of Mills's esophagus.  He had a colonoscopy in 2013 with polypectomy with Dr. Hughes.  He has also had ERCP for choledocholithiasis in the past and has a history of cholecystectomy.  No other complaints currently.     Past Medical History:   Diagnosis Date    Mills's esophagus     DDD (degenerative disc disease), cervical     Ear ringing     Gallbladder disease     Gastric ulcer with perforation     GERD (gastroesophageal reflux disease)     Kidney stone     Vertigo        Past Surgical History:   Procedure Laterality Date    ADENOIDECTOMY      CHOLECYSTECTOMY      COLONOSCOPY W/ POLYPECTOMY  1/2013    Dr Hughes    ERCP  12/26/2016    [facility]    ESOPHAGOGASTRODUODENOSCOPY  1/2013    Dr Hughes    TONSILLECTOMY      VASECTOMY         Social History   Substance Use Topics    Smoking status: Never Smoker    Smokeless tobacco: Never Used    Alcohol use No       Family History   Problem Relation Age of Onset    Heart disease Father     No Known Problems Mother     No Known Problems Sister     Cancer Brother      possible colon cancer    No Known Problems Daughter     No Known Problems Son     Cancer Sister      breast cancer    No Known Problems Daughter     No Known Problems Son        Review of Systems  General ROS: negative for - chills, fever or weight loss  Psychological ROS: negative for - hallucination, depression or suicidal ideation  Ophthalmic ROS: negative for - blurry vision, photophobia or eye pain  ENT " "ROS: negative for - epistaxis, sore throat or rhinorrhea  Respiratory ROS: no cough, shortness of breath, or wheezing  Cardiovascular ROS: no chest pain or dyspnea on exertion  Gastrointestinal ROS: + epigastric pain, N/V, hematemesis  Genito-Urinary ROS: no dysuria, trouble voiding, or hematuria  Musculoskeletal ROS: negative for - arthralgia, myalgia, weakness  Neurological ROS: no syncope or seizures; no ataxia  Dermatological ROS: negative for pruritis, rash and jaundice    Physical Examination  /67 (BP Location: Left arm, Patient Position: Lying)   Pulse (!) 53   Temp 98.2 °F (36.8 °C) (Oral)   Resp 20   Ht 6' 2" (1.88 m)   Wt 108.9 kg (240 lb)   SpO2 (!) 94%   BMI 30.81 kg/m²   General appearance: alert, cooperative, no distress  HENT: Normocephalic, atraumatic, neck symmetrical, no nasal discharge   Eyes: conjunctivae/corneas clear, PERRL, EOM's intact, sclera anicteric  Lungs: clear to auscultation bilaterally, no dullness to percussion bilaterally, symmetric expansion, breathing unlabored  Heart: regular rate and rhythm without rub; no displacement of the PMI   Abdomen: Soft, NT , + BS  Extremities: extremities symmetric; no clubbing, cyanosis, or edema  Integument: Skin color, texture, turgor normal; no rashes; hair distrubution normal, no jaundice  Neurologic: Alert and oriented X 3, no focal sensory or motor neurologic deficits  Psychiatric: no pressured speech; normal affect; no evidence of impaired cognition, no anxiety/depression     Labs:  Lab Results   Component Value Date    WBC 10.20 08/31/2017    HGB 14.2 08/31/2017    HCT 41.9 08/31/2017    MCV 86 08/31/2017     08/31/2017       CMP  Sodium   Date Value Ref Range Status   08/31/2017 139 136 - 145 mmol/L Final     Potassium   Date Value Ref Range Status   08/31/2017 3.7 3.5 - 5.1 mmol/L Final     Chloride   Date Value Ref Range Status   08/31/2017 106 95 - 110 mmol/L Final     CO2   Date Value Ref Range Status   08/31/2017 25 " 23 - 29 mmol/L Final     Glucose   Date Value Ref Range Status   08/31/2017 105 70 - 110 mg/dL Final     BUN, Bld   Date Value Ref Range Status   08/31/2017 11 8 - 23 mg/dL Final     Creatinine   Date Value Ref Range Status   08/31/2017 0.8 0.5 - 1.4 mg/dL Final     Calcium   Date Value Ref Range Status   08/31/2017 8.7 8.7 - 10.5 mg/dL Final     Total Protein   Date Value Ref Range Status   08/30/2017 7.7 6.0 - 8.4 g/dL Final     Albumin   Date Value Ref Range Status   08/30/2017 4.2 3.5 - 5.2 g/dL Final     Total Bilirubin   Date Value Ref Range Status   08/30/2017 0.9 0.1 - 1.0 mg/dL Final     Comment:     For infants and newborns, interpretation of results should be based  on gestational age, weight and in agreement with clinical  observations.  Premature Infant recommended reference ranges:  Up to 24 hours.............<8.0 mg/dL  Up to 48 hours............<12.0 mg/dL  3-5 days..................<15.0 mg/dL  6-29 days.................<15.0 mg/dL       Alkaline Phosphatase   Date Value Ref Range Status   08/30/2017 75 55 - 135 U/L Final     AST   Date Value Ref Range Status   08/30/2017 21 10 - 40 U/L Final     ALT   Date Value Ref Range Status   08/30/2017 43 10 - 44 U/L Final     Anion Gap   Date Value Ref Range Status   08/31/2017 8 8 - 16 mmol/L Final     eGFR if    Date Value Ref Range Status   08/31/2017 >60 >60 mL/min/1.73 m^2 Final     eGFR if non    Date Value Ref Range Status   08/31/2017 >60 >60 mL/min/1.73 m^2 Final     Comment:     Calculation used to obtain the estimated glomerular filtration  rate (eGFR) is the CKD-EPI equation. Since race is unknown   in our information system, the eGFR values for   -American and Non--American patients are given   for each creatinine result.           Imaging:  Abdominal x ray was independently visualized and reviewed by me and showed no acute process.    I have personally reviewed these images    Old records from ER  physician reviewed and are as summarized above in the HPI.      Assessment:   1.  Hematemesis  2.  Epigastric pain  3.  N/V  4.  History of Mills's esophagus    Plan:  1.  Agree with PPI  2.  Watch H/H  3.  Antiemetics PRN  4.  EGD today  5.  Avoid NSAIDs  6.  Further recommendations to follow after above.  7.  Communication will be sent to the referring MD, Dr. Vital regarding my assessment and plan on this patient via EPIC.      Adrian Cho MD  [facility]  [address], [address]  [place] [address]  Office: [phone]  Fax: [phone]

## 2017-08-31 NOTE — ED PROVIDER NOTES
Encounter Date: 8/30/2017    SCRIBE #1 NOTE: IMary, kevan scribing for, and in the presence of, Dr. Prado.       History     Chief Complaint   Patient presents with    Hematemesis       08/30/2017 11:20 PM     Chief Complaint: Hematemesis & abdominal pain       Ad Lyons is a 62 y.o. male with a history of GERD, Mills's esophagus, gallbladder disease, and gastric ulcer with perforation who presents to the ED with complaints of hematemesis associated with nausea and mid abdominal pain. Pain began two days ago and hematemesis began 2 hours ago. The patient states he noticed black tarry stool when using the restroom this evening. He endorses a recurrence of peptic ulcers. He states of experiencing SOB last week, but has since resolved. He also endorses high stress levels recently due to moving between jobs. He denies dysuria, back pain, chest pain, headaches, weakness, and fever. PSHx includes cholecystectomy, esophagogastroduodenectomy, and colonoscopy with polypectomy. Aspirin allergy noted.     The history is provided by the patient.     Review of patient's allergies indicates:   Allergen Reactions    Aspirin Other (See Comments)     Stomach ulcers     Past Medical History:   Diagnosis Date    Mills's esophagus     DDD (degenerative disc disease), cervical     Ear ringing     Gallbladder disease     Gastric ulcer with perforation     GERD (gastroesophageal reflux disease)     Kidney stone     Vertigo      Past Surgical History:   Procedure Laterality Date    ADENOIDECTOMY      CHOLECYSTECTOMY      COLONOSCOPY W/ POLYPECTOMY  1/2013    Dr Hughes    [facility]CP  12/26/2016    [facility]    ESOPHAGOGASTRODUODENOSCOPY  1/2013    Dr Hughes    TONSILLECTOMY      VASECTOMY       Family History   Problem Relation Age of Onset    Heart disease Father     No Known Problems Mother     No Known Problems Sister     Cancer Brother      possible colon cancer    No Known Problems  Daughter     No Known Problems Son     Cancer Sister      breast cancer    No Known Problems Daughter     No Known Problems Son      Social History   Substance Use Topics    Smoking status: Never Smoker    Smokeless tobacco: Never Used    Alcohol use No     Review of Systems   Constitutional: Negative for fever.   HENT: Negative for congestion.    Eyes: Negative for visual disturbance.   Respiratory: Positive for shortness of breath (resolved).    Cardiovascular: Negative for chest pain.   Gastrointestinal: Positive for abdominal pain, nausea and vomiting (Hematemesis).        Black tarry stool   Genitourinary: Negative for dysuria.   Musculoskeletal: Negative for joint swelling.   Skin: Negative for rash.   Neurological: Negative for dizziness, syncope and weakness.   Hematological: Does not bruise/bleed easily.   Psychiatric/Behavioral: Negative for confusion.       Physical Exam     Initial Vitals [08/30/17 2307]   BP Pulse Resp Temp SpO2   (!) 176/95 78 20 98.7 °F (37.1 °C) 95 %      MAP       122         Physical Exam    Constitutional: He appears well-nourished.   HENT:   Head: Normocephalic and atraumatic.   Eyes: Conjunctivae and EOM are normal.   Neck: Normal range of motion. Neck supple. No thyroid mass present.   Cardiovascular: Normal rate, regular rhythm and normal heart sounds. Exam reveals no gallop and no friction rub.    No murmur heard.  Pulmonary/Chest: Breath sounds normal. No respiratory distress. He has no wheezes. He has no rhonchi. He has no rales.   Abdominal: Normal appearance. There is tenderness in the epigastric area. There is no rebound and no guarding.   Mild, mid-upper epigastric tenderness.   Musculoskeletal: Normal range of motion. He exhibits no tenderness.   Neurological: He is alert and oriented to person, place, and time. He has normal strength. No cranial nerve deficit or sensory deficit.   Skin: Skin is warm and dry. No rash noted. No erythema.   Psychiatric: He has a  normal mood and affect. His speech is normal. Cognition and memory are normal.         ED Course   Procedures  Labs Reviewed - No data to display  EKG Readings: (Independently Interpreted)   Normal sinus rhythm, 61 beats per minute, normal axis, normal intervals, no acute ischemic wave morphology, no STEMI          Medical Decision Making:   Initial Assessment:   This patient was interviewed and examined and it does appear he is progressing with hematemesis which I would associate with a probable recurrence of his peptic ulcer disease.  IV was established and he was provided bolus hydration with antiemetic.  Type and screen will be obtained, as well as blood analyses.  Patient was provided a bolus and infusion Protonix.  ED Management:  Labs are found to be largely unremarkable.  The patient did have a few recurrences of hematemesis in the emergency department but reported improvement by the end of the period of ED observation.  I do think he warrants admission for further monitoring and potential urgent gastroenterology consultation.  Case was discussed with Mrs. Samaniego who agreed to admit the patient.  He'll be transferred to a telemetry bed in guarded condition.            Scribe Attestation:   Scribe #1: I performed the above scribed service and the documentation accurately describes the services I performed. I attest to the accuracy of the note.    Attending Attestation:           Physician Attestation for Scribe:  Physician Attestation Statement for Scribe #1: I, Dr. Prado, reviewed documentation, as scribed by Mary Yañez in my presence, and it is both accurate and complete.                 ED Course     Clinical Impression:   The encounter diagnosis was Hematemesis with nausea.    Disposition:   Disposition: Placed in Observation  Condition: Nakia Prado MD  08/31/17 0221

## 2017-08-31 NOTE — PLAN OF CARE
Problem: Patient Care Overview  Goal: Plan of Care Review  Outcome: Ongoing (interventions implemented as appropriate)  egd consult

## 2017-08-31 NOTE — ANESTHESIA PREPROCEDURE EVALUATION
08/31/2017  Ad Lyons is a 62 y.o., male.    Pre-op Assessment    I have reviewed the Patient Summary Reports.     I have reviewed the Nursing Notes.   I have reviewed the Medications.     Review of Systems  Anesthesia Hx:  No problems with previous Anesthesia Denies Hx of Anesthetic complications  Denies Family Hx of Anesthesia complications.   Denies Personal Hx of Anesthesia complications.   Social:  Non-Smoker    Cardiovascular:  Cardiovascular Normal     Pulmonary:  Pulmonary Normal    Renal/:   Denies Chronic Renal Disease. renal calculi     Hepatic/GI:   PUD, GERD, poorly controlled Denies Liver Disease. hematemesis   Musculoskeletal:  Spine Disorders: cervical Degenerative disease and Disc disease    Neurological:  Neurology Normal    Endocrine:  Endocrine Normal    Psych:   Denies Psychiatric History.          Physical Exam  General:  Well nourished, Obesity    Airway/Jaw/Neck:  Airway Findings: Mouth Opening: Normal Tongue: Normal  General Airway Assessment: Adult, Good  Mallampati: II  Improves to II with phonation.  TM Distance: 4-6 cm      Dental:  Dental Findings: In tact   Chest/Lungs:  Chest/Lungs Clear    Heart/Vascular:  Heart Findings: Normal Heart murmur: negative       Mental Status:  Mental Status Findings:  Cooperative, Alert and Oriented         Anesthesia Plan  Type of Anesthesia, risks & benefits discussed:  Anesthesia Type:  general  Patient's Preference:   Intra-op Monitoring Plan:   Intra-op Monitoring Plan Comments:   Post Op Pain Control Plan:   Post Op Pain Control Plan Comments:   Induction:   IV  Beta Blocker:  Patient is not currently on a Beta-Blocker (No further documentation required).       Informed Consent: Patient understands risks and agrees with Anesthesia plan.  Questions answered. Anesthesia consent signed with patient.  ASA Score: 2     Day of Surgery  Review of History & Physical:    H&P update referred to the provider.         Ready For Surgery From Anesthesia Perspective.

## 2017-08-31 NOTE — ANESTHESIA POSTPROCEDURE EVALUATION
"Anesthesia Post Evaluation    Patient: Ad Lyons    Procedure(s) Performed: Procedure(s) (LRB):  ESOPHAGOGASTRODUODENOSCOPY (EGD) (N/A)    Final Anesthesia Type: general  Patient location during evaluation: PACU  Patient participation: Yes- Able to Participate  Level of consciousness: awake and alert  Post-procedure vital signs: reviewed and stable  Pain management: adequate  Airway patency: patent  PONV status at discharge: No PONV  Anesthetic complications: no      Cardiovascular status: blood pressure returned to baseline  Respiratory status: unassisted  Hydration status: euvolemic  Follow-up not needed.        Visit Vitals  /70   Pulse 76   Temp 36.4 °C (97.6 °F) (Oral)   Resp 16   Ht 6' 2" (1.88 m)   Wt 108.9 kg (240 lb)   SpO2 98%   BMI 30.81 kg/m²       Pain/Jyothi Score: Pain Assessment Performed: Yes (8/31/2017 10:00 AM)  Presence of Pain: denies (8/31/2017 10:00 AM)  Pain Rating Post Med Admin: 0 (8/31/2017  1:49 AM)  Jyothi Score: 10 (8/31/2017  9:05 AM)      "

## 2017-08-31 NOTE — PLAN OF CARE
08/31/17 1300   Final Note   Assessment Type Final Discharge Note   Discharge Disposition Home

## 2017-08-31 NOTE — HPI
Mr. Lyons is a 63yo m with a PMH of GERD, Perforated Gastric Ulcer, and Mills's Esophagus. He presents to the hospital with c/o abdominal pain and hematemesis. The pain began about 2 days ago and the hematemesis started a few hours ago. Associated symptoms include black tarry stool x1 today. While in the ED, a protonix drip was initiated. He currently reports feeling better.

## 2017-08-31 NOTE — TRANSFER OF CARE
"Anesthesia Transfer of Care Note    Patient: Ad Lyons    Procedure(s) Performed: Procedure(s) (LRB):  ESOPHAGOGASTRODUODENOSCOPY (EGD) (N/A)    Patient location: GI    Anesthesia Type: general    Transport from OR: Transported from OR on 2-3 L/min O2 by NC with adequate spontaneous ventilation    Post pain: adequate analgesia    Post assessment: no apparent anesthetic complications    Post vital signs: stable    Level of consciousness: awake, alert and oriented    Nausea/Vomiting: no nausea/vomiting    Complications: none    Transfer of care protocol was followed      Last vitals:   Visit Vitals  /67 (BP Location: Left arm, Patient Position: Lying)   Pulse (!) 53   Temp 36.8 °C (98.2 °F) (Oral)   Resp 20   Ht 6' 2" (1.88 m)   Wt 108.9 kg (240 lb)   SpO2 (!) 94%   BMI 30.81 kg/m²     "

## 2017-08-31 NOTE — NURSING
Pt to be discharged home in stable condition. Discharge instructions reviewed with pt, understanding verbalized. IV DC'ed, catheter intact.

## 2017-08-31 NOTE — ASSESSMENT & PLAN NOTE
NPO   Protonix drip  Consult GI  Monitor CBC  Transfuse if HGB <7.0 or symptomatic  IV antiemetics

## 2017-08-31 NOTE — H&P
[facility] Hospital Medicine  History & Physical    Patient Name: Ad Lyons  MRN: [id number]  Admission Date: 8/30/2017  Attending Physician: Gege Vital MD   Primary Care Provider: Jimmie Carbajal MD         Patient information was obtained from patient and ER records.     Subjective:     Principal Problem:Acute upper GI bleed    Chief Complaint:   Chief Complaint   Patient presents with    Hematemesis        HPI: Mr. Lyons is a 61yo m with a PMH of GERD, Perforated Gastric Ulcer, and Mills's Esophagus. He presents to the hospital with c/o abdominal pain and hematemesis. The pain began about 2 days ago and the hematemesis started a few hours ago. Associated symptoms include black tarry stool x1 today. While in the ED, a protonix drip was initiated. He currently reports feeling better.    Past Medical History:   Diagnosis Date    Mills's esophagus     DDD (degenerative disc disease), cervical     Ear ringing     Gallbladder disease     Gastric ulcer with perforation     GERD (gastroesophageal reflux disease)     Kidney stone     Vertigo        Past Surgical History:   Procedure Laterality Date    ADENOIDECTOMY      CHOLECYSTECTOMY      COLONOSCOPY W/ POLYPECTOMY  1/2013    Dr Hughes    [facility]CP  12/26/2016    [facility]    ESOPHAGOGASTRODUODENOSCOPY  1/2013    Dr Hughes    TONSILLECTOMY      VASECTOMY         Review of patient's allergies indicates:   Allergen Reactions    Aspirin Other (See Comments)     Stomach ulcers       No current facility-administered medications on file prior to encounter.      Current Outpatient Prescriptions on File Prior to Encounter   Medication Sig    fluticasone (FLONASE) 50 mcg/actuation nasal spray 4 sprays by Nasal route daily as needed.     L.ACID/L.CASEI/B.BIF/B.KWAKU/FOS (PROBIOTIC BLEND ORAL) Take 1 capsule by mouth 2 (two) times daily.    meclizine (ANTIVERT) 25 mg tablet Take 1 tablet (25 mg total) by mouth 4  (four) times daily. (Patient taking differently: Take 25 mg by mouth 4 (four) times daily as needed. )    omeprazole (PRILOSEC) 40 MG capsule TAKE 1 CAPSULE (40 MG TOTAL) BY MOUTH ONCE DAILY.     Family History     Problem Relation (Age of Onset)    Cancer Brother, Sister    Heart disease Father    No Known Problems Mother, Sister, Daughter, Son, Daughter, Son        Social History Main Topics    Smoking status: Never Smoker    Smokeless tobacco: Never Used    Alcohol use No    Drug use: No    Sexual activity: Not on file     Review of Systems   Constitutional: Negative for appetite change, fatigue and fever.   Eyes: Negative for visual disturbance.   Respiratory: Negative for cough and shortness of breath.    Cardiovascular: Negative for chest pain.   Gastrointestinal: Positive for abdominal pain, nausea and vomiting.        Black tarry stool. Hematemesis.   Genitourinary: Negative for dysuria.   Musculoskeletal: Negative for arthralgias.   Skin: Negative for pallor and wound.   Neurological: Negative for dizziness, syncope and headaches.   Hematological: Does not bruise/bleed easily.   Psychiatric/Behavioral: Negative for confusion and hallucinations.     Objective:     Vital Signs (Most Recent):  Temp: 98.7 °F (37.1 °C) (08/30/17 2307)  Pulse: (!) 58 (08/31/17 0027)  Resp: 20 (08/30/17 2307)  BP: 132/77 (08/31/17 0017)  SpO2: (!) 94 % (08/31/17 0027) Vital Signs (24h Range):  Temp:  [98.7 °F (37.1 °C)] 98.7 °F (37.1 °C)  Pulse:  [58-78] 58  Resp:  [20] 20  SpO2:  [94 %-95 %] 94 %  BP: (132-176)/(77-95) 132/77     Weight: 108.9 kg (240 lb)  Body mass index is 30.81 kg/m².    Physical Exam   Constitutional: He is oriented to person, place, and time. No distress.   HENT:   Head: Normocephalic.   Eyes: Pupils are equal, round, and reactive to light.   Neck: Normal range of motion. Neck supple. No JVD present. No tracheal deviation present.   Cardiovascular: Normal rate, regular rhythm, normal heart sounds and  intact distal pulses.    Pulmonary/Chest: Effort normal and breath sounds normal.   Abdominal: Soft. Bowel sounds are normal. He exhibits no distension. There is tenderness in the epigastric area. There is no guarding.   Musculoskeletal: Normal range of motion. He exhibits no edema.   Neurological: He is alert and oriented to person, place, and time. No cranial nerve deficit.   Skin: Skin is warm, dry and intact. Capillary refill takes less than 2 seconds.   Psychiatric: He has a normal mood and affect. His behavior is normal. Judgment and thought content normal.        Significant Labs:   CBC:   Recent Labs  Lab 08/30/17  2323   WBC 11.60   HGB 15.8   HCT 47.4        CMP:   Recent Labs  Lab 08/30/17  2323   *   K 3.8      CO2 23   *   BUN 13   CREATININE 0.9   CALCIUM 9.4   PROT 7.7   ALBUMIN 4.2   BILITOT 0.9   ALKPHOS 75   AST 21   ALT 43   ANIONGAP 10   EGFRNONAA >60     Coagulation:   Recent Labs  Lab 08/30/17  2323   INR 1.0     Lipase:   Recent Labs  Lab 08/30/17  2323   LIPASE 16       Significant Imaging: Abdominal Xray: Radiologist's report not downloaded.    Assessment/Plan:     * Acute upper GI bleed    NPO   Protonix drip  Consult GI  Monitor CBC  Transfuse if HGB <7.0 or symptomatic  IV antiemetics             Gastroesophageal reflux disease    Protonix drip for now due to current condition            VTE Risk Mitigation         Ordered     Low Risk of VTE  Once      08/31/17 0113             Giselle Samaniego NP  Department of Hospital Medicine   [facility]

## 2017-08-31 NOTE — PLAN OF CARE
PCP is Dr Carbajal.  Pharmacy is [facility] on [address] [facility].  Patient is without health insurance.  Contacted Scarlett, Medicaid Counselor, to inform that patient will be discharging soon; Scarlett will come meet with patient shortly.       08/31/17 1258   Discharge Assessment   Assessment Type Discharge Planning Assessment   Confirmed/corrected address and phone number on facesheet? Yes   Assessment information obtained from? Patient   Prior to hospitilization cognitive status: Alert/Oriented   Prior to hospitalization functional status: Independent   Current cognitive status: Alert/Oriented   Current Functional Status: Independent   Lives With spouse   Able to Return to Prior Arrangements yes   Is patient able to care for self after discharge? Yes   Patient's perception of discharge disposition home or selfcare   Readmission Within The Last 30 Days no previous admission in last 30 days   Patient currently being followed by outpatient case management? No   Patient currently receives any other outside agency services? No   Equipment Currently Used at Home none   Do you have any problems affording any of your prescribed medications? No   Is the patient taking medications as prescribed? yes   Does the patient have transportation home? Yes   Transportation Available family or friend will provide   Does the patient receive services at the [facility]madin Clinic? No   Discharge Plan A Home   Patient/Family In Agreement With Plan yes

## 2017-09-01 ENCOUNTER — TELEPHONE (OUTPATIENT)
Dept: GASTROENTEROLOGY | Facility: CLINIC | Age: 62
End: 2017-09-01

## 2017-09-01 ENCOUNTER — NURSE TRIAGE (OUTPATIENT)
Dept: ADMINISTRATIVE | Facility: CLINIC | Age: 62
End: 2017-09-01

## 2017-09-01 NOTE — TELEPHONE ENCOUNTER
Spoke with pharmacy and carafate changed to tablets ($22)  and protonix patient will get otc prilosec and take 2 tablets for 40mg, patient agreed and is able to afford.

## 2017-09-01 NOTE — TELEPHONE ENCOUNTER
----- Message from Shabana Sanchez sent at 9/1/2017 11:18 AM CDT -----  Contact: self  Patient was given some prescription  The medications are very expensive  He would like to know if he can get in eneric form    Please call him at    Thanks

## 2017-09-01 NOTE — HOSPITAL COURSE
GI consulted. Patient underwent upper GI with findings of esophagitis  and no bleeding. He received IV protonix and carafate. Hgb/Hct stable/WNL.  No further hematemesis during admission. Patient cleared for DC from GI standpoint.     PE: Chest: CTA. Abd: soft, NT

## 2017-09-01 NOTE — DISCHARGE SUMMARY
[facility]  Hospital Medicine  Discharge Summary      Patient Name: Ad Lyons  MRN: [id number]  Admission Date: 8/30/2017  Hospital Length of Stay: 0 days  Discharge Date and Time: 8/31/2017  2:00 PM  Attending Physician: No att. providers found   Discharging Provider: Yamileth Jeronimo NP  Primary Care Provider: Jimmie Carbajal MD      HPI:   Mr. Lyons is a 61yo m with a PMH of GERD, Perforated Gastric Ulcer, and Mills's Esophagus. He presents to the hospital with c/o abdominal pain and hematemesis. The pain began about 2 days ago and the hematemesis started a few hours ago. Associated symptoms include black tarry stool x1 today. While in the ED, a protonix drip was initiated. He currently reports feeling better.    Procedure(s) (LRB):  ESOPHAGOGASTRODUODENOSCOPY (EGD) (N/A)      Indwelling Lines/Drains at time of discharge:   Lines/Drains/Airways          No matching active lines, drains, or airways        Hospital Course:   GI consulted. Patient underwent upper GI with findings of esophagitis  and no bleeding. He received IV protonix and carafate. Hgb/Hct stable/WNL.  No further hematemesis during admission. Patient cleared for DC from GI standpoint.     PE: Chest: CTA. Abd: soft, NT     Consults:   Consults         Status Ordering Provider     Inpatient consult to Gastroenterology  Once     Provider:  Adrian Castillo MD    Completed MITA ISIDRO          Significant Diagnostic Studies: Labs:   BMP:   Recent Labs  Lab 08/30/17  2323 08/31/17  0420   * 105   * 139   K 3.8 3.7    106   CO2 23 25   BUN 13 11   CREATININE 0.9 0.8   CALCIUM 9.4 8.7    and CMP   Recent Labs  Lab 08/30/17  2323 08/31/17  0420   * 139   K 3.8 3.7    106   CO2 23 25   * 105   BUN 13 11   CREATININE 0.9 0.8   CALCIUM 9.4 8.7   PROT 7.7  --    ALBUMIN 4.2  --    BILITOT 0.9  --    ALKPHOS 75  --    AST 21  --    ALT 43  --    ANIONGAP 10 8   ESTGFRAFRICA >60 >60    EGFRNONAA >60 >60       Pending Diagnostic Studies:     None        Final Active Diagnoses:    Diagnosis Date Noted POA    PRINCIPAL PROBLEM:  Acute upper GI bleed [K92.2] 08/31/2017 Yes    Gastroesophageal reflux disease [K21.9] 03/28/2016 Yes      Problems Resolved During this Admission:    Diagnosis Date Noted Date Resolved POA      No new Assessment & Plan notes have been filed under this hospital service since the last note was generated.  Service: Hospital Medicine      Discharged Condition: stable    Disposition: Home or Self Care    Follow Up:  Follow-up Information     Jimmie Carbajal MD In 1 week.    Specialty:  Family Medicine  Why:  hospital follow up  Contact information:  [address]  [place] [address]  [phone]             Adrian Castillo MD In 6 weeks.    Specialty:  Gastroenterology  Why:  hospital follow up  Contact information:  [address]  [address]  [place] [address]  [phone]                 Patient Instructions:     Diet general     Activity as tolerated     Call MD for:  persistent nausea and vomiting or diarrhea     Call MD for:  redness, tenderness, or signs of infection (pain, swelling, redness, odor or green/yellow discharge around incision site)     Call MD for:  persistent dizziness, light-headedness, or visual disturbances       Medications:  Reconciled Home Medications:   Discharge Medication List as of 8/31/2017 12:52 PM      START taking these medications    Details   sucralfate (CARAFATE) 100 mg/mL suspension Take 10 mLs (1 g total) by mouth every 6 (six) hours., Starting Thu 8/31/2017, Until Thu 9/14/2017, Normal         CONTINUE these medications which have CHANGED    Details   pantoprazole (PROTONIX) 40 MG tablet Take 1 tablet (40 mg total) by mouth once daily., Starting Thu 8/31/2017, Until Fri 8/31/2018, Normal         CONTINUE these medications which have NOT CHANGED    Details   L.ACID/L.CASEI/B.BIF/B.KWAKU/FOS (PROBIOTIC BLEND ORAL) Take 1 capsule by  mouth 2 (two) times daily., Until Discontinued, Historical Med      omeprazole (PRILOSEC) 40 MG capsule TAKE 1 CAPSULE (40 MG TOTAL) BY MOUTH ONCE DAILY., Starting Fri 5/26/2017, Normal      fluticasone (FLONASE) 50 mcg/actuation nasal spray 4 sprays by Nasal route daily as needed. , Starting 12/9/2016, Until Discontinued, Historical Med      meclizine (ANTIVERT) 25 mg tablet Take 1 tablet (25 mg total) by mouth 4 (four) times daily., Starting 9/7/2016, Until Discontinued, Print           Time spent on the discharge of patient: 35 minutes    HOS POC IP DISCHARGE SUMMARY    Yamileth Jeronimo NP  Department of Hospital Medicine  [facility]

## 2017-09-06 ENCOUNTER — TELEPHONE (OUTPATIENT)
Dept: GASTROENTEROLOGY | Facility: CLINIC | Age: 62
End: 2017-09-06

## 2017-09-12 ENCOUNTER — TELEPHONE (OUTPATIENT)
Dept: FAMILY MEDICINE | Facility: CLINIC | Age: 62
End: 2017-09-12

## 2017-09-12 NOTE — TELEPHONE ENCOUNTER
----- Message from Abida Fuentes sent at 9/12/2017 11:06 AM CDT -----  Contact: self [phone]  Please call him to schedule an appt to follow up his tests.  Thank you!

## 2017-09-18 ENCOUNTER — DOCUMENTATION ONLY (OUTPATIENT)
Dept: FAMILY MEDICINE | Facility: CLINIC | Age: 62
End: 2017-09-18

## 2017-09-18 NOTE — PROGRESS NOTES
Pre-Visit Chart Review  For Appointment Scheduled on 9-20-17    Health Maintenance Due   Topic Date Due    TETANUS VACCINE  01/15/1973    Influenza Vaccine  08/01/2017

## 2017-09-20 ENCOUNTER — OFFICE VISIT (OUTPATIENT)
Dept: FAMILY MEDICINE | Facility: CLINIC | Age: 62
End: 2017-09-20
Payer: MEDICAID

## 2017-09-20 ENCOUNTER — LAB VISIT (OUTPATIENT)
Dept: LAB | Facility: HOSPITAL | Age: 62
End: 2017-09-20
Attending: FAMILY MEDICINE
Payer: MEDICAID

## 2017-09-20 ENCOUNTER — PATIENT MESSAGE (OUTPATIENT)
Dept: FAMILY MEDICINE | Facility: CLINIC | Age: 62
End: 2017-09-20

## 2017-09-20 VITALS
TEMPERATURE: 98 F | HEIGHT: 74 IN | DIASTOLIC BLOOD PRESSURE: 70 MMHG | SYSTOLIC BLOOD PRESSURE: 104 MMHG | WEIGHT: 228.63 LBS | OXYGEN SATURATION: 95 % | HEART RATE: 72 BPM | BODY MASS INDEX: 29.34 KG/M2 | RESPIRATION RATE: 16 BRPM

## 2017-09-20 DIAGNOSIS — K92.0 HEMATEMESIS WITH NAUSEA: ICD-10-CM

## 2017-09-20 DIAGNOSIS — H81.13 BPV (BENIGN POSITIONAL VERTIGO), BILATERAL: Primary | ICD-10-CM

## 2017-09-20 DIAGNOSIS — R11.2 INTRACTABLE VOMITING WITH NAUSEA, UNSPECIFIED VOMITING TYPE: ICD-10-CM

## 2017-09-20 DIAGNOSIS — R42 VERTIGO: ICD-10-CM

## 2017-09-20 DIAGNOSIS — H93.12 TINNITUS AURIUM, LEFT: ICD-10-CM

## 2017-09-20 DIAGNOSIS — H91.92 HEARING LOSS OF LEFT EAR, UNSPECIFIED HEARING LOSS TYPE: ICD-10-CM

## 2017-09-20 LAB
BASOPHILS # BLD AUTO: 0.04 K/UL
BASOPHILS NFR BLD: 0.6 %
DIFFERENTIAL METHOD: NORMAL
EOSINOPHIL # BLD AUTO: 0.4 K/UL
EOSINOPHIL NFR BLD: 6 %
ERYTHROCYTE [DISTWIDTH] IN BLOOD BY AUTOMATED COUNT: 13.7 %
HCT VFR BLD AUTO: 44.3 %
HGB BLD-MCNC: 15.4 G/DL
LYMPHOCYTES # BLD AUTO: 2.6 K/UL
LYMPHOCYTES NFR BLD: 36.2 %
MCH RBC QN AUTO: 29.5 PG
MCHC RBC AUTO-ENTMCNC: 34.8 G/DL
MCV RBC AUTO: 85 FL
MONOCYTES # BLD AUTO: 0.6 K/UL
MONOCYTES NFR BLD: 9.1 %
NEUTROPHILS # BLD AUTO: 3.4 K/UL
NEUTROPHILS NFR BLD: 47.8 %
PLATELET # BLD AUTO: 238 K/UL
PMV BLD AUTO: 9.8 FL
RBC # BLD AUTO: 5.22 M/UL
WBC # BLD AUTO: 7.04 K/UL

## 2017-09-20 PROCEDURE — 85025 COMPLETE CBC W/AUTO DIFF WBC: CPT

## 2017-09-20 PROCEDURE — 99214 OFFICE O/P EST MOD 30 MIN: CPT | Mod: PBBFAC,PO | Performed by: FAMILY MEDICINE

## 2017-09-20 PROCEDURE — 99999 PR PBB SHADOW E&M-EST. PATIENT-LVL IV: CPT | Mod: PBBFAC,,, | Performed by: FAMILY MEDICINE

## 2017-09-20 PROCEDURE — 3008F BODY MASS INDEX DOCD: CPT | Mod: ,,, | Performed by: FAMILY MEDICINE

## 2017-09-20 PROCEDURE — 36415 COLL VENOUS BLD VENIPUNCTURE: CPT | Mod: PO

## 2017-09-20 PROCEDURE — 99214 OFFICE O/P EST MOD 30 MIN: CPT | Mod: S$PBB,,, | Performed by: FAMILY MEDICINE

## 2017-09-20 RX ORDER — CHLORHEXIDINE GLUCONATE ORAL RINSE 1.2 MG/ML
15 SOLUTION DENTAL DAILY
COMMUNITY
Start: 2017-04-22 | End: 2017-09-26 | Stop reason: SDUPTHER

## 2017-09-20 RX ORDER — MECLIZINE HYDROCHLORIDE 25 MG/1
25 TABLET ORAL 4 TIMES DAILY PRN
Qty: 100 TABLET | Refills: 5 | Status: SHIPPED | OUTPATIENT
Start: 2017-09-20 | End: 2017-12-18 | Stop reason: SDUPTHER

## 2017-09-20 RX ORDER — ONDANSETRON 8 MG/1
8 TABLET, ORALLY DISINTEGRATING ORAL EVERY 8 HOURS
Qty: 6 TABLET | Refills: 3 | Status: SHIPPED | OUTPATIENT
Start: 2017-09-20 | End: 2018-02-16 | Stop reason: ALTCHOICE

## 2017-09-20 RX ORDER — SUCRALFATE 1 G/1
1 TABLET ORAL 4 TIMES DAILY
COMMUNITY
Start: 2017-09-01 | End: 2018-02-16 | Stop reason: SDUPTHER

## 2017-09-20 RX ORDER — ONDANSETRON 8 MG/1
8 TABLET, ORALLY DISINTEGRATING ORAL
COMMUNITY
End: 2017-09-20 | Stop reason: SDUPTHER

## 2017-09-20 NOTE — PROGRESS NOTES
Subjective:       Patient ID: Ad Lyons is a 62 y.o. male.    Chief Complaint: Hospital Follow Up    62-year-old male presents for a hospital follow-up from [facility] August 30 331.  He presented to the emergency room with abdominal pain and hematemesis with melanotic stool.  He reports to me that he had been having problems with vertigo which is an ongoing episodic event.  It became so bad that he started intractable vomiting and came to the emergency room with the hematemesis.  The patient was started on IV Protonix and an EGD was done showing esophagitis but no bleeding.  The patient actually has the report and photos from the EGD with him.  He was monitored overnight and was stable vital signs, no ongoing vomiting and stable labs he was discharged in next day on Carafate and Protonix.  He's had no further emesis or hematemesis but his dizziness continues.  For many years he's had episodes of vertigo when he looks to the right associated with mild tinnitus in the right ear that has happened for many years and probably associated with noise exposure.  He now states that he had rapid onset of tinnitus and hearing loss in the left ear starting about 2 years ago.  He went to Dr. Ray Mohan at the time where he had an evaluation and audiology exam as well as an MRI and nothing was found.  He was told that he needed hearing aids which he does have but they don't help this problem.  Since then the left ear tinnitus, hearing loss, and vertigo have become more severe.  We do not have records of the older visit.  He was taught the Epley maneuvers which he does regularly but states they do not help as well.  He was observed to going through it with some adjustments made.    Past Medical History:  No date: Mills's esophagus  No date: DDD (degenerative disc disease), cervical  No date: Ear ringing  No date: Gallbladder disease  No date: Gastric ulcer with perforation  No date: GERD (gastroesophageal  reflux disease)  No date: Kidney stone  No date: Vertigo    Past Surgical History:  No date: ADENOIDECTOMY  No date: CHOLECYSTECTOMY  1/2013: COLONOSCOPY W/ POLYPECTOMY      Comment: Dr Hughes  12/26/2016: ERCP      Comment: [facility] note  1/2013: ESOPHAGOGASTRODUODENOSCOPY      Comment: Dr Hughes  No date: TONSILLECTOMY  No date: VASECTOMY      Current Outpatient Prescriptions:     chlorhexidine (PERIDEX) 0.12 % solution, Use as directed 15 mLs in the mouth or throat once daily. Jacinto Dye DDS, Disp: , Rfl:     fluticasone (FLONASE) 50 mcg/actuation nasal spray, 4 sprays by Nasal route daily as needed. , Disp: , Rfl:     meclizine (ANTIVERT) 25 mg tablet, Take 1 tablet (25 mg total) by mouth 4 (four) times daily as needed., Disp: 100 tablet, Rfl: 5    omeprazole (PRILOSEC) 40 MG capsule, TAKE 1 CAPSULE (40 MG TOTAL) BY MOUTH ONCE DAILY., Disp: 90 capsule, Rfl: 2    ondansetron (ZOFRAN-ODT) 8 MG TbDL, Take 1 tablet (8 mg total) by mouth every 8 (eight) hours., Disp: 6 tablet, Rfl: 3    sucralfate (CARAFATE) 1 gram tablet, Take 1 g by mouth 4 (four) times daily., Disp: , Rfl:           Review of Systems   Constitutional: Negative for activity change and unexpected weight change.   HENT: Negative for hearing loss, rhinorrhea and trouble swallowing.    Eyes: Negative for discharge and visual disturbance.   Respiratory: Negative for chest tightness and wheezing.    Cardiovascular: Negative for chest pain and palpitations.   Gastrointestinal: Positive for vomiting. Negative for blood in stool, constipation and diarrhea.   Endocrine: Negative for polydipsia and polyuria.   Genitourinary: Negative for difficulty urinating, hematuria and urgency.   Musculoskeletal: Negative for arthralgias, joint swelling and neck pain.   Neurological: Negative for weakness and headaches.   Psychiatric/Behavioral: Negative for confusion and dysphoric mood.       Objective:      Physical Exam   Constitutional: He is  oriented to person, place, and time. He appears well-developed. No distress.   Normal blood pressure  Overweight with BMI 29.4.  He is down 11.9 pounds since his last visit with me January 6, 2017   Abdominal: Soft. Bowel sounds are normal. He exhibits no distension and no mass. There is no tenderness. There is no rebound and no guarding.   Neurological: He is alert and oriented to person, place, and time.   Skin: Skin is warm and dry. He is not diaphoretic. No pallor.   Psychiatric: He has a normal mood and affect. His behavior is normal. Thought content normal.   Nursing note and vitals reviewed.      Assessment:       1. BPV (benign positional vertigo), bilateral    2. Hearing loss of left ear, unspecified hearing loss type    3. Tinnitus aurium, left    4. Hematemesis with nausea    5. Vertigo    6. Intractable vomiting with nausea, unspecified vomiting type        Plan:       1. BPV (benign positional vertigo), bilateral  I'm concerned with the worsening condition and new symptoms on the left than the last 2 years could be a signal of an acoustic neuroma and recommend follow-up with ENT and reevaluation with repeat audiology.  Will see if we can get Dr. Mohan to take another look at him.  - Ambulatory referral to ENT    2. Hearing loss of left ear, unspecified hearing loss type  - Ambulatory referral to ENT    3. Tinnitus aurium, left  - Ambulatory referral to ENT    4. Hematemesis with nausea  I suspect he had a Jacy-Gonsalez tear that was not seen on endoscopy.  If his CBC is stable and he currently has no symptoms he can discontinue the Carafate when he completes his current supply and stay on the Protonix for at least another month  - CBC auto differential; Future    5. Vertigo  - meclizine (ANTIVERT) 25 mg tablet; Take 1 tablet (25 mg total) by mouth 4 (four) times daily as needed.  Dispense: 100 tablet; Refill: 5    6. Intractable vomiting with nausea, unspecified vomiting type  - ondansetron  (ZOFRAN-ODT) 8 MG TbDL; Take 1 tablet (8 mg total) by mouth every 8 (eight) hours.  Dispense: 6 tablet; Refill: 3

## 2017-09-20 NOTE — PATIENT INSTRUCTIONS

## 2017-09-25 ENCOUNTER — TELEPHONE (OUTPATIENT)
Dept: OTOLARYNGOLOGY | Facility: CLINIC | Age: 62
End: 2017-09-25

## 2017-09-25 ENCOUNTER — PATIENT MESSAGE (OUTPATIENT)
Dept: OTOLARYNGOLOGY | Facility: CLINIC | Age: 62
End: 2017-09-25

## 2017-09-25 NOTE — TELEPHONE ENCOUNTER
Spoke with pt and informed him how to send his PDF through his [facility] messages. Pt verbalized understanding.

## 2017-09-25 NOTE — TELEPHONE ENCOUNTER
----- Message from Shaunna Youngblood sent at 9/25/2017 10:24 AM CDT -----  Patient needs to speak with nurse concerning sending over previous ENT history to doctor before tomorrow's appointment on Tuesday, September 26th/please call back at [phone] to advise.

## 2017-09-26 ENCOUNTER — CLINICAL SUPPORT (OUTPATIENT)
Dept: AUDIOLOGY | Facility: CLINIC | Age: 62
End: 2017-09-26
Payer: MEDICAID

## 2017-09-26 ENCOUNTER — PATIENT MESSAGE (OUTPATIENT)
Dept: FAMILY MEDICINE | Facility: CLINIC | Age: 62
End: 2017-09-26

## 2017-09-26 ENCOUNTER — OFFICE VISIT (OUTPATIENT)
Dept: OTOLARYNGOLOGY | Facility: CLINIC | Age: 62
End: 2017-09-26
Payer: MEDICAID

## 2017-09-26 VITALS
BODY MASS INDEX: 29.34 KG/M2 | WEIGHT: 228.63 LBS | SYSTOLIC BLOOD PRESSURE: 122 MMHG | HEIGHT: 74 IN | DIASTOLIC BLOOD PRESSURE: 70 MMHG

## 2017-09-26 DIAGNOSIS — H93.292 IMPAIRED AUDITORY DISCRIMINATION, LEFT: ICD-10-CM

## 2017-09-26 DIAGNOSIS — H90.A32 MIXED CONDUCTIVE AND SENSORINEURAL HEARING LOSS OF LEFT EAR WITH RESTRICTED HEARING OF RIGHT EAR: ICD-10-CM

## 2017-09-26 DIAGNOSIS — R42 DIZZINESS: ICD-10-CM

## 2017-09-26 DIAGNOSIS — H81.02 ACTIVE COCHLEOVESTIBULAR MENIERE'S DISEASE OF LEFT EAR: ICD-10-CM

## 2017-09-26 DIAGNOSIS — H90.A21 SENSORINEURAL HEARING LOSS (SNHL) OF RIGHT EAR WITH RESTRICTED HEARING OF LEFT EAR: Primary | ICD-10-CM

## 2017-09-26 PROCEDURE — 99999 PR PBB SHADOW E&M-EST. PATIENT-LVL III: CPT | Mod: PBBFAC,,, | Performed by: OTOLARYNGOLOGY

## 2017-09-26 PROCEDURE — 99204 OFFICE O/P NEW MOD 45 MIN: CPT | Mod: S$PBB,,, | Performed by: OTOLARYNGOLOGY

## 2017-09-26 PROCEDURE — 99999 PR PBB SHADOW E&M-EST. PATIENT-LVL I: CPT | Mod: PBBFAC,,,

## 2017-09-26 PROCEDURE — 99211 OFF/OP EST MAY X REQ PHY/QHP: CPT | Mod: PBBFAC,27,PO

## 2017-09-26 PROCEDURE — 99213 OFFICE O/P EST LOW 20 MIN: CPT | Mod: PBBFAC,PO | Performed by: OTOLARYNGOLOGY

## 2017-09-26 PROCEDURE — 3008F BODY MASS INDEX DOCD: CPT | Mod: ,,, | Performed by: OTOLARYNGOLOGY

## 2017-09-26 PROCEDURE — 92557 COMPREHENSIVE HEARING TEST: CPT | Mod: PBBFAC,PO | Performed by: AUDIOLOGIST

## 2017-09-26 PROCEDURE — 92567 TYMPANOMETRY: CPT | Mod: PBBFAC,PO | Performed by: AUDIOLOGIST

## 2017-09-26 RX ORDER — CHLORHEXIDINE GLUCONATE ORAL RINSE 1.2 MG/ML
SOLUTION DENTAL
Qty: 473 ML | Refills: 0 | Status: SHIPPED | OUTPATIENT
Start: 2017-09-26 | End: 2018-06-11 | Stop reason: SDUPTHER

## 2017-09-26 RX ORDER — MAGNESIUM 200 MG
1 TABLET ORAL DAILY
COMMUNITY
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
## 2017-09-26 NOTE — PATIENT INSTRUCTIONS
Menieres Disease      Low Salt Diet (< 1500 mg per day). No MSG. No Nutrasweet, especially diet drinks. Limit caffeine to no more than 2 cups of coffee per day. Avoid alcohol. Avoid nicotine/tobacco.        Menieres disease is a chronic recurring condition. It is due to a problem with the inner ear, the part of the ear responsible for balance as well as hearing. Symptoms include:  · Sudden attacks of vertigo: Vertigo is a spinning or whirling feeling that causes balance problems. These attacks often include nausea, vomiting, and sweating. During an attack of vertigo, head movement and body position changes will worsen symptoms.  · Hearing problems: Hearing is often partly or completely lost during the vertigo attack, then comes back. Over time, though, hearing can be affected.  · Tinnitus: This is ringing, buzzing, whistling, or roaring noises in the ear. It may come and go or always be present.   · A feeling of pressure of fullness in the ear.  Attacks may occur weeks, months, or even years apart. Each episode of vertigo may last 20 minutes to several hours. The symptoms are due to changes in fluid in the inner ear canals, but the exact cause is not known.   Treatment for Menieres disease includes lifestyle changes, medicines, and medical procedures. Surgery may be recommended in severe cases.  Home care  Medicines  You may be prescribed medicine to take regularly to help prevent attacks. You may also be prescribed medicine to take only during attacks. Take these as directed.  Lifestyle changes  These lifestyle changes can help make attacks less frequent or less severe.   · Reduce your salt intake. Eating too much salt can make this condition worse. A common recommendation is to limit sodium to no more than 2,300 mg a day. Talk to your healthcare provider about ways to limit sodium in your diet.  · Quit smoking.  · Limit alcohol and caffeine.  · Try to limit stress.  During Attacks  If an attack is about to  start or has started, take any medicine you have been prescribed for an attack. Lie down on a firm surface in a darkened room. Stay as still as possible. Avoid bright light. Do not try to read or watch TV. Don't get up until the spinning passes.  Follow-up care  Follow up with your healthcare provider for further evaluation and treatment. If you have been referred to a specialist, schedule that appointment promptly.  When to seek medical advice  Call your healthcare provider right away if you have:  · Worsening of symptoms  · Increased weakness or fainting  · Headache or unusual drowsiness  · Difficulty with speech or movement  · Fever of 100.4°F (38ºC) or higher, or as directed by your healthcare provider  Date Last Reviewed: 4/26/2015  © 3675-0680 [employer]. [address] [place] [address], [place] [address]. All rights reserved. This information is not intended as a substitute for professional medical care. Always follow your healthcare professional's instructions.

## 2017-09-26 NOTE — LETTER
September 26, 2017        Jimmie Carbajal MD  [address] [place] [address]             [facility]  [address] [facility] [address]  Phone: [phone]  Fax: [phone]   Patient: Ad Lyons   MR Number: [id number]   YOB: 1955   Date of Visit: 9/26/2017       Dear Dr. Carbajal:    Thank you for referring Ad Lyons to me for evaluation. Below are the relevant portions of my assessment and plan of care.        1. Active cochleovestibular Meniere's disease of left ear            Discussed diet and salt restriction  Conductive component hearing loss - otosclerosis?   Discussed natural course and history  Consider Dyazide but history of renal stones and reported low BP. We will try diet for now and see how he does.  Continue current hearing aids he obtained at outside facility.  Follow-up 4 months     If you have questions, please do not hesitate to call me. I look forward to following Ad along with you.    Sincerely,      Moe Castro MD           CC  No Recipients

## 2017-09-26 NOTE — Clinical Note
September 26, 2017      Jimmie Carbajal MD  [address] [facility] [address]  [place] [address]           [facility]  [address] [facility] [address] [place] [address]  Phone: [phone]  Fax: [phone]          Patient: Ad Lyons   MR Number: [id number]   YOB: 1955   Date of Visit: 9/26/2017       Dear Dr. Jimmie Carbajal:    Thank you for referring Ad Lyons to me for evaluation. Attached you will find relevant portions of my assessment and plan of care.    If you have questions, please do not hesitate to call me. I look forward to following Ad Lyons along with you.    Sincerely,    Moe Castro MD    Enclosure  CC:  No Recipients    If you would like to receive this communication electronically, please contact [email or web address] or [phone] to request more information on [employer] access.    For providers and/or their staff who would like to refer a patient to [facility], please contact us through our one-stop-shop provider referral line, [facility], at [phone].    If you feel you have received this communication in error or would no longer like to receive these types of communications, please e-mail [email or web address]

## 2017-09-27 ENCOUNTER — NURSE TRIAGE (OUTPATIENT)
Dept: ADMINISTRATIVE | Facility: CLINIC | Age: 62
End: 2017-09-27

## 2017-09-27 ENCOUNTER — HOSPITAL ENCOUNTER (EMERGENCY)
Facility: HOSPITAL | Age: 62
Discharge: HOME | End: 2017-09-27
Attending: EMERGENCY MEDICINE
Payer: MEDICAID

## 2017-09-27 VITALS
BODY MASS INDEX: 29.53 KG/M2 | OXYGEN SATURATION: 98 % | DIASTOLIC BLOOD PRESSURE: 72 MMHG | TEMPERATURE: 98 F | SYSTOLIC BLOOD PRESSURE: 124 MMHG | WEIGHT: 230 LBS | RESPIRATION RATE: 16 BRPM | HEART RATE: 55 BPM

## 2017-09-27 DIAGNOSIS — R10.13 EPIGASTRIC PAIN: Primary | ICD-10-CM

## 2017-09-27 LAB
ALBUMIN SERPL BCP-MCNC: 3.7 G/DL
ALP SERPL-CCNC: 63 U/L
ALT SERPL W/O P-5'-P-CCNC: 25 U/L
ANION GAP SERPL CALC-SCNC: 8 MMOL/L
AST SERPL-CCNC: 19 U/L
BASOPHILS # BLD AUTO: 0 K/UL
BASOPHILS NFR BLD: 0.1 %
BILIRUB SERPL-MCNC: 0.7 MG/DL
BUN SERPL-MCNC: 13 MG/DL
CALCIUM SERPL-MCNC: 8.9 MG/DL
CHLORIDE SERPL-SCNC: 107 MMOL/L
CO2 SERPL-SCNC: 24 MMOL/L
CREAT SERPL-MCNC: 0.9 MG/DL
DIFFERENTIAL METHOD: ABNORMAL
EOSINOPHIL # BLD AUTO: 0.2 K/UL
EOSINOPHIL NFR BLD: 1.4 %
ERYTHROCYTE [DISTWIDTH] IN BLOOD BY AUTOMATED COUNT: 13.9 %
EST. GFR  (AFRICAN AMERICAN): >60 ML/MIN/1.73 M^2
EST. GFR  (NON AFRICAN AMERICAN): >60 ML/MIN/1.73 M^2
GLUCOSE SERPL-MCNC: 108 MG/DL
HCT VFR BLD AUTO: 43 %
HGB BLD-MCNC: 14.5 G/DL
LIPASE SERPL-CCNC: 16 U/L
LYMPHOCYTES # BLD AUTO: 1.1 K/UL
LYMPHOCYTES NFR BLD: 9.4 %
MCH RBC QN AUTO: 29.1 PG
MCHC RBC AUTO-ENTMCNC: 33.8 G/DL
MCV RBC AUTO: 86 FL
MONOCYTES # BLD AUTO: 0.4 K/UL
MONOCYTES NFR BLD: 3.8 %
NEUTROPHILS # BLD AUTO: 9.6 K/UL
NEUTROPHILS NFR BLD: 85.3 %
PLATELET # BLD AUTO: 205 K/UL
PMV BLD AUTO: 7.7 FL
POTASSIUM SERPL-SCNC: 3.7 MMOL/L
PROT SERPL-MCNC: 6.7 G/DL
RBC # BLD AUTO: 4.98 M/UL
SODIUM SERPL-SCNC: 139 MMOL/L
WBC # BLD AUTO: 11.2 K/UL

## 2017-09-27 PROCEDURE — 83690 ASSAY OF LIPASE: CPT

## 2017-09-27 PROCEDURE — 93005 ELECTROCARDIOGRAM TRACING: CPT

## 2017-09-27 PROCEDURE — 85025 COMPLETE CBC W/AUTO DIFF WBC: CPT

## 2017-09-27 PROCEDURE — 99284 EMERGENCY DEPT VISIT MOD MDM: CPT

## 2017-09-27 PROCEDURE — 36415 COLL VENOUS BLD VENIPUNCTURE: CPT

## 2017-09-27 PROCEDURE — 80053 COMPREHEN METABOLIC PANEL: CPT

## 2017-09-27 NOTE — TELEPHONE ENCOUNTER
Reason for Disposition   SEVERE abdominal pain (e.g., excruciating)    Protocols used: ST ABDOMINAL PAIN - MALE-A-OH

## 2017-09-27 NOTE — ED PROVIDER NOTES
Encounter Date: 9/27/2017    SCRIBE #1 NOTE: INuvia, am scribing for, and in the presence of, Dr. Olivarez.       History     Chief Complaint   Patient presents with    Abdominal Pain       09/27/2017 12:04 PM     Chief Complaint: Abdominal Pain      Ad Lyons is a 62 y.o. male with a history of peptic ulcer disease, gastritis, esophagitis, hiatal hernia, status post cholecystectomy and ERCP who presents to the ED with complaint of upper abdominal pain that began this morning. Patient was admitted the hospital a month ago for a upper GI bleed. Since discharge, he reports diarrhea x 3 weeks with one loose bowel movement per day.  He has been taking Imodium as needed for this. Today, he woke up with abdominal pain and had acute worsening at 11 AM with associated cold sweats and lightheadedness. He spoke with an on-call nurse and was instructed to come to the ED. Currently, his abdominal pain is only minimal in intensity. He denies significant abdominal pain since discharge from the hospital prior to today. Reports taking omeprazole as needed for discharge. He denies fever, syncope, chest pain, SOB, difficulty urinating, rash, blood in stool, dark stools. He denies history of CAD. Known drug allergy to aspirin.       The history is provided by the patient.     Review of patient's allergies indicates:   Allergen Reactions    Aspirin Other (See Comments)     Stomach ulcers     Past Medical History:   Diagnosis Date    Mills's esophagus     DDD (degenerative disc disease), cervical     Ear ringing     Gallbladder disease     Gastric ulcer with perforation     GERD (gastroesophageal reflux disease)     Kidney stone     Vertigo      Past Surgical History:   Procedure Laterality Date    ADENOIDECTOMY      CHOLECYSTECTOMY      COLONOSCOPY W/ POLYPECTOMY  1/2013    Dr Hughes    ERCP  12/26/2016    [facility] note    ESOPHAGOGASTRODUODENOSCOPY  1/2013    Dr Hughes    TONSILLECTOMY       VASECTOMY       Family History   Problem Relation Age of Onset    Heart disease Father     No Known Problems Mother     No Known Problems Sister     Cancer Brother      possible colon cancer    No Known Problems Daughter     No Known Problems Son     Cancer Sister      breast cancer    No Known Problems Daughter     No Known Problems Son      Social History   Substance Use Topics    Smoking status: Never Smoker    Smokeless tobacco: Never Used    Alcohol use No     Review of Systems   Constitutional: Positive for diaphoresis. Negative for fever.   HENT: Negative for trouble swallowing.    Eyes: Negative for visual disturbance.   Respiratory: Negative for shortness of breath.    Cardiovascular: Negative for chest pain.   Gastrointestinal: Positive for abdominal pain and diarrhea. Negative for blood in stool.   Genitourinary: Negative for difficulty urinating.   Musculoskeletal: Negative for neck stiffness.   Skin: Negative for rash.   Neurological: Positive for light-headedness. Negative for syncope.       Physical Exam     Initial Vitals [09/27/17 1156]   BP Pulse Resp Temp SpO2   124/72 (!) 55 16 98.1 °F (36.7 °C) 98 %      MAP       89.33         Physical Exam    Constitutional: He appears well-developed and well-nourished.   HENT:   Head: Normocephalic and atraumatic.   Mouth/Throat: Oropharynx is clear and moist.   Eyes: Conjunctivae are normal.   Neck: Normal range of motion. Neck supple.   Cardiovascular: Normal rate, regular rhythm and normal heart sounds.   Pulmonary/Chest: Breath sounds normal. No respiratory distress. He has no wheezes. He has no rhonchi. He has no rales.   Abdominal: Soft. He exhibits no distension. There is no tenderness. No hernia.   Musculoskeletal: Normal range of motion. He exhibits no edema.   Neurological: He is alert and oriented to person, place, and time.   Skin: Skin is warm and dry. No rash noted.   Psychiatric: He has a normal mood and affect.         ED  Course   Procedures  Labs Reviewed   CBC W/ AUTO DIFFERENTIAL - Abnormal; Notable for the following:        Result Value    MPV 7.7 (*)     Gran # 9.6 (*)     Gran% 85.3 (*)     Lymph% 9.4 (*)     Mono% 3.8 (*)     All other components within normal limits   LIPASE   COMPREHENSIVE METABOLIC PANEL             Medical Decision Making:   History:   Old Medical Records: I decided to obtain old medical records.  Clinical Tests:   Lab Tests: Ordered and Reviewed  Medical Tests: Ordered and Reviewed            Scribe Attestation:   Scribe #1: I performed the above scribed service and the documentation accurately describes the services I performed. I attest to the accuracy of the note.    Attending Attestation:           Physician Attestation for Scribe:  Physician Attestation Statement for Scribe #1: I, Dr. Olivarez, reviewed documentation, as scribed by Nuvia Ruiz in my presence, and it is both accurate and complete.         Ad Lyons is a 62 y.o. male presenting with episode of upper abdominal pain now symptom free with no reproducible Parminder pain on examination.  Very low suspicion for emergent intra-abdominal process such as perforated viscus, obstruction, abscess, volvulus, appendicitis, AAA.  I do not think further abdominal imaging is indicated. EKG was reviewed with no sign of acute ischemia or infarction.  I doubt ACS.  I do not think further troponin enzyme assay or provocative testing is indicated.  He does have a significant history for recent gastritis with self-limited GI bleed.  No change in hemoglobin or sign of hypovolemia.  I doubt significant GI bleed.  I suspect upper GI etiology.  He remained symptom free with observation during workup and is appropriate for outpatient follow-up.  Continue PPI.  Return precautions reviewed.          ED Course as of Sep 27 1502   Wed Sep 27, 2017   1226 EKG:  Sinus bradycardia, rate of 52, occasional PACs.  Normal intervals.  There are no acute ST or T wave  changes suggestive of acute ischemia or infarction.  [MR]      ED Course User Index  [MR] Paresh Olivarez MD     Clinical Impression:   The encounter diagnosis was Epigastric pain.                           Paresh Olivarez MD  09/27/17 7703

## 2017-10-06 RX ORDER — FLUTICASONE PROPIONATE 50 MCG
SPRAY, SUSPENSION (ML) NASAL
Qty: 16 G | Refills: 3 | Status: SHIPPED | OUTPATIENT
Start: 2017-10-06 | End: 2018-04-18 | Stop reason: SDUPTHER

## 2017-11-06 NOTE — PROGRESS NOTES
Audiological evaluation completed per order from Dr. Moe Castro.     Audiogram results were reviewed in detail with patient and all questions were answered.    Recommend VNG/CDP to assess inner ear balance function.   Recommend binaural amplification pending medical clearance and annual audiogram to monitor hearing loss.

## 2017-11-10 ENCOUNTER — OFFICE VISIT (OUTPATIENT)
Dept: GASTROENTEROLOGY | Facility: CLINIC | Age: 62
End: 2017-11-10
Payer: MEDICAID

## 2017-11-10 VITALS
WEIGHT: 227.75 LBS | SYSTOLIC BLOOD PRESSURE: 126 MMHG | DIASTOLIC BLOOD PRESSURE: 82 MMHG | BODY MASS INDEX: 29.23 KG/M2 | HEIGHT: 74 IN | HEART RATE: 57 BPM

## 2017-11-10 DIAGNOSIS — Z86.010 HISTORY OF COLON POLYPS: ICD-10-CM

## 2017-11-10 DIAGNOSIS — K21.9 GASTROESOPHAGEAL REFLUX DISEASE, ESOPHAGITIS PRESENCE NOT SPECIFIED: ICD-10-CM

## 2017-11-10 DIAGNOSIS — K22.70 BARRETT'S ESOPHAGUS WITHOUT DYSPLASIA: Primary | ICD-10-CM

## 2017-11-10 PROBLEM — K92.2 ACUTE UPPER GI BLEED: Status: RESOLVED | Noted: 2017-08-31 | Resolved: 2017-11-10

## 2017-11-10 PROCEDURE — 99999 PR PBB SHADOW E&M-EST. PATIENT-LVL III: CPT | Mod: PBBFAC,,, | Performed by: INTERNAL MEDICINE

## 2017-11-10 PROCEDURE — 99213 OFFICE O/P EST LOW 20 MIN: CPT | Mod: PBBFAC,PO | Performed by: INTERNAL MEDICINE

## 2017-11-10 PROCEDURE — 99214 OFFICE O/P EST MOD 30 MIN: CPT | Mod: S$PBB,,, | Performed by: INTERNAL MEDICINE

## 2017-11-10 NOTE — PROGRESS NOTES
"Subjective:       Patient ID: Ad Lyons is a 62 y.o. male.    This is an established patient.      Chief Complaint: GERD    Patient seen for follow up of GERD, remote onset, associated with an episode of hematemesis which was evaluated by inpatient EGD and showed esophagitis and gastritis with no active bleeding.  Symptoms now completely resolved.  He reports that he feels well when taking his PPI and has no dysphagia.  He had Mills's esophagus with no dysplasia noted on prior EGD and most recent biopsies were unremarkable.  Last colonoscopy was in January 2013 and he had one polyp (unknown histology).  No other complaints currently.      Review of Systems   Constitutional: Negative for chills, fatigue and fever.   HENT: Negative for trouble swallowing.    Respiratory: Negative for cough, shortness of breath and wheezing.    Cardiovascular: Negative for chest pain and palpitations.   Gastrointestinal: Negative for abdominal pain, constipation, diarrhea, nausea and vomiting.   All other systems reviewed and are negative.      Objective:         Vitals:    11/10/17 0902   BP: 126/82   Pulse: (!) 57   Weight: 103.3 kg (227 lb 11.8 oz)   Height: 6' 2" (1.88 m)       Physical Exam   Constitutional: He is oriented to person, place, and time. He appears well-developed and well-nourished.   HENT:   Head: Normocephalic and atraumatic.   Mouth/Throat: Oropharynx is clear and moist.   Eyes: Conjunctivae are normal. Pupils are equal, round, and reactive to light. No scleral icterus.   Cardiovascular: Normal rate and regular rhythm.    No murmur heard.  Pulmonary/Chest: Effort normal and breath sounds normal. He has no wheezes.   Abdominal: Soft. Bowel sounds are normal. He exhibits no distension. There is no tenderness.   Musculoskeletal: He exhibits no edema.   Neurological: He is alert and oriented to person, place, and time.   Psychiatric: He has a normal mood and affect. His behavior is normal.   Vitals " reviewed.        Lab Results   Component Value Date    WBC 11.20 09/27/2017    HGB 14.5 09/27/2017    HCT 43.0 09/27/2017    MCV 86 09/27/2017     09/27/2017         Assessment:       1. Mills's esophagus without dysplasia    2. Gastroesophageal reflux disease, esophagitis presence not specified    3. History of colon polyps        Plan:       1.  Schedule colonoscopy for polyp surveillance  2.  EGD for Mills's surveillance due in 2 years.  3.  Continue PPI  4.  Proven risks of long term PPI use, including pneumonia, c.diff infection, and bone loss, as well as theoretical risks including dementia and CKD, were discussed with the patient at length and the patient understands risks and benefits of therapy.  Option of tapering/weaning PPI away was also discussed, including the need for possible long term therapy to treat symptoms if they recur after cessation of medication, as well as to mitigate the risk of developing complications of reflux such as Mills's esophagus and/or esophageal cancer.  Patient understands the risks and benefits of treatment with drug versus cessation.  Daily supplementation with MVI with calcium and vitamin D were recommended as was follow up with PCP for bone scan when appropriate.  Labs including B12, folate, CMP, CBC, calcium, and magnesium should be checked at least annually.  5.  Avoid NSAIDs.  6.  Further recommendations to follow after above.

## 2017-11-13 ENCOUNTER — TELEPHONE (OUTPATIENT)
Dept: GASTROENTEROLOGY | Facility: CLINIC | Age: 62
End: 2017-11-13

## 2017-11-13 NOTE — TELEPHONE ENCOUNTER
----- Message from Carito De La Garza sent at 11/13/2017 10:12 AM CST -----  Patient would like to talk to Earl concerning the CPT code for his colonoscopy. Please call back to advise at [phone].

## 2017-12-09 ENCOUNTER — NURSE TRIAGE (OUTPATIENT)
Dept: ADMINISTRATIVE | Facility: CLINIC | Age: 62
End: 2017-12-09

## 2017-12-10 NOTE — TELEPHONE ENCOUNTER
Pt called re cant make cscope. Pt going out of town. Message sent to endoscopy. Call back with questions.     Reason for Disposition   Question about upcoming scheduled test, no triage required and triager able to answer question    Protocols used: ST INFORMATION ONLY CALL-A-AH

## 2017-12-11 ENCOUNTER — TELEPHONE (OUTPATIENT)
Dept: GASTROENTEROLOGY | Facility: CLINIC | Age: 62
End: 2017-12-11

## 2017-12-11 NOTE — TELEPHONE ENCOUNTER
----- Message from Mariah Holm sent at 12/11/2017  8:12 AM CST -----  Contact: self  Patient called regarding cancelling his colonoscopy scheduled this morning. Please contact [phone] (home)

## 2017-12-18 DIAGNOSIS — R42 VERTIGO: ICD-10-CM

## 2017-12-18 RX ORDER — MECLIZINE HYDROCHLORIDE 25 MG/1
TABLET ORAL
Qty: 100 TABLET | Refills: 0 | Status: SHIPPED | OUTPATIENT
Start: 2017-12-18 | End: 2018-02-16 | Stop reason: ALTCHOICE

## 2017-12-27 ENCOUNTER — SURGERY (OUTPATIENT)
Age: 62
End: 2017-12-27

## 2017-12-27 ENCOUNTER — HOSPITAL ENCOUNTER (OUTPATIENT)
Facility: HOSPITAL | Age: 62
Discharge: HOME | End: 2017-12-27
Attending: INTERNAL MEDICINE | Admitting: INTERNAL MEDICINE
Payer: MEDICAID

## 2017-12-27 ENCOUNTER — ANESTHESIA (OUTPATIENT)
Dept: ENDOSCOPY | Facility: HOSPITAL | Age: 62
End: 2017-12-27
Payer: MEDICAID

## 2017-12-27 ENCOUNTER — ANESTHESIA EVENT (OUTPATIENT)
Dept: ENDOSCOPY | Facility: HOSPITAL | Age: 62
End: 2017-12-27
Payer: MEDICAID

## 2017-12-27 DIAGNOSIS — K63.5 POLYP OF COLON, UNSPECIFIED PART OF COLON, UNSPECIFIED TYPE: Primary | ICD-10-CM

## 2017-12-27 DIAGNOSIS — K64.8 INTERNAL HEMORRHOIDS: ICD-10-CM

## 2017-12-27 DIAGNOSIS — Z86.010 HX OF COLONIC POLYPS: ICD-10-CM

## 2017-12-27 PROCEDURE — D9220A PRA ANESTHESIA: Mod: ANES,,, | Performed by: ANESTHESIOLOGY

## 2017-12-27 PROCEDURE — 45385 COLONOSCOPY W/LESION REMOVAL: CPT | Mod: ,,, | Performed by: INTERNAL MEDICINE

## 2017-12-27 PROCEDURE — 37000008 HC ANESTHESIA 1ST 15 MINUTES: Performed by: INTERNAL MEDICINE

## 2017-12-27 PROCEDURE — 27201012 HC FORCEPS, HOT/COLD, DISP: Performed by: INTERNAL MEDICINE

## 2017-12-27 PROCEDURE — 25000003 PHARM REV CODE 250: Performed by: NURSE ANESTHETIST, CERTIFIED REGISTERED

## 2017-12-27 PROCEDURE — 37000009 HC ANESTHESIA EA ADD 15 MINS: Performed by: INTERNAL MEDICINE

## 2017-12-27 PROCEDURE — 88305 TISSUE EXAM BY PATHOLOGIST: CPT | Performed by: PATHOLOGY

## 2017-12-27 PROCEDURE — 45385 COLONOSCOPY W/LESION REMOVAL: CPT | Performed by: INTERNAL MEDICINE

## 2017-12-27 PROCEDURE — 25000003 PHARM REV CODE 250: Performed by: INTERNAL MEDICINE

## 2017-12-27 PROCEDURE — D9220A PRA ANESTHESIA: Mod: CRNA,,, | Performed by: NURSE ANESTHETIST, CERTIFIED REGISTERED

## 2017-12-27 PROCEDURE — 63600175 PHARM REV CODE 636 W HCPCS: Performed by: NURSE ANESTHETIST, CERTIFIED REGISTERED

## 2017-12-27 PROCEDURE — 45380 COLONOSCOPY AND BIOPSY: CPT | Performed by: INTERNAL MEDICINE

## 2017-12-27 PROCEDURE — 27201089 HC SNARE, DISP (ANY): Performed by: INTERNAL MEDICINE

## 2017-12-27 PROCEDURE — 45380 COLONOSCOPY AND BIOPSY: CPT | Mod: 59,,, | Performed by: INTERNAL MEDICINE

## 2017-12-27 RX ORDER — SODIUM CHLORIDE 9 MG/ML
INJECTION, SOLUTION INTRAVENOUS CONTINUOUS
Status: DISCONTINUED | OUTPATIENT
Start: 2017-12-27 | End: 2017-12-27 | Stop reason: HOSPADM

## 2017-12-27 RX ORDER — PROPOFOL 10 MG/ML
VIAL (ML) INTRAVENOUS
Status: DISCONTINUED | OUTPATIENT
Start: 2017-12-27 | End: 2017-12-27

## 2017-12-27 RX ORDER — LIDOCAINE HYDROCHLORIDE 10 MG/ML
INJECTION, SOLUTION INTRAVENOUS
Status: DISCONTINUED | OUTPATIENT
Start: 2017-12-27 | End: 2017-12-27

## 2017-12-27 RX ADMIN — PROPOFOL 40 MG: 10 INJECTION, EMULSION INTRAVENOUS at 10:12

## 2017-12-27 RX ADMIN — PROPOFOL 120 MG: 10 INJECTION, EMULSION INTRAVENOUS at 10:12

## 2017-12-27 RX ADMIN — LIDOCAINE HYDROCHLORIDE 50 MG: 10 INJECTION, SOLUTION INTRAVENOUS at 10:12

## 2017-12-27 RX ADMIN — SODIUM CHLORIDE 1000 ML: 0.9 INJECTION, SOLUTION INTRAVENOUS at 09:12

## 2017-12-27 NOTE — ANESTHESIA PREPROCEDURE EVALUATION
12/27/2017  Ad Lyons is a 62 y.o., male.    Pre-op Assessment    I have reviewed the Patient Summary Reports.     I have reviewed the Nursing Notes.   I have reviewed the Medications.     Review of Systems  Anesthesia Hx:  No problems with previous Anesthesia Denies Hx of Anesthetic complications  Denies Family Hx of Anesthesia complications.   Denies Personal Hx of Anesthesia complications.   Social:  Non-Smoker    Cardiovascular:  Cardiovascular Normal     Pulmonary:  Pulmonary Normal    Renal/:   Denies Chronic Renal Disease. renal calculi     Hepatic/GI:   PUD, GERD, poorly controlled Denies Liver Disease. hematemesis   Musculoskeletal:  Spine Disorders: cervical Degenerative disease and Disc disease    Neurological:  Neurology Normal    Endocrine:  Endocrine Normal    Psych:   Denies Psychiatric History.          Physical Exam  General:  Well nourished, Obesity    Airway/Jaw/Neck:  Airway Findings: Mouth Opening: Normal Tongue: Normal  General Airway Assessment: Adult, Good  Mallampati: II  Improves to II with phonation.  TM Distance: 4-6 cm      Dental:  Dental Findings: In tact   Chest/Lungs:  Chest/Lungs Clear    Heart/Vascular:  Heart Findings: Normal Heart murmur: negative       Mental Status:  Mental Status Findings:  Cooperative, Alert and Oriented         Anesthesia Plan  Type of Anesthesia, risks & benefits discussed:  Anesthesia Type:  general  Patient's Preference:   Intra-op Monitoring Plan:   Intra-op Monitoring Plan Comments:   Post Op Pain Control Plan:   Post Op Pain Control Plan Comments:   Induction:   IV  Beta Blocker:  Patient is not currently on a Beta-Blocker (No further documentation required).       Informed Consent: Patient understands risks and agrees with Anesthesia plan.  Questions answered. Anesthesia consent signed with patient.  ASA Score: 2     Day of Surgery  Review of History & Physical:    H&P update referred to the provider.         Ready For Surgery From Anesthesia Perspective.

## 2017-12-27 NOTE — ANESTHESIA POSTPROCEDURE EVALUATION
"Anesthesia Post Evaluation    Patient: Ad Lyons    Procedure(s) Performed: Procedure(s) (LRB):  COLONOSCOPY (N/A)    Final Anesthesia Type: general  Patient location during evaluation: PACU  Patient participation: Yes- Able to Participate  Level of consciousness: awake and alert  Post-procedure vital signs: reviewed and stable  Pain management: adequate  Airway patency: patent  PONV status at discharge: No PONV  Anesthetic complications: no      Cardiovascular status: blood pressure returned to baseline  Respiratory status: unassisted  Hydration status: euvolemic  Follow-up not needed.        Visit Vitals  /69 (BP Location: Left arm, Patient Position: Lying)   Pulse (!) 55   Temp 36.7 °C (98 °F) (Oral)   Resp 18   Ht 6' 2" (1.88 m)   Wt 104.3 kg (230 lb)   SpO2 97%   BMI 29.53 kg/m²       Pain/Jyothi Score: Pain Assessment Performed: Yes (12/27/2017 11:20 AM)  Presence of Pain: denies (12/27/2017 11:20 AM)  Jyothi Score: 9 (12/27/2017 11:20 AM)      "

## 2017-12-27 NOTE — TRANSFER OF CARE
"Anesthesia Transfer of Care Note    Patient: Ad Lyons    Procedure(s) Performed: Procedure(s) (LRB):  COLONOSCOPY (N/A)    Patient location: PACU    Anesthesia Type: general    Transport from OR: Transported from OR on 2-3 L/min O2 by NC with adequate spontaneous ventilation    Post pain: adequate analgesia    Post assessment: no apparent anesthetic complications and tolerated procedure well    Post vital signs: stable    Level of consciousness: awake, alert and oriented    Nausea/Vomiting: no nausea/vomiting    Complications: none    Transfer of care protocol was followed      Last vitals:   Visit Vitals  BP 90/60   Pulse 62   Temp 36.6 °C (97.9 °F) (Oral)   Resp 20   Ht 6' 2" (1.88 m)   Wt 104.3 kg (230 lb)   SpO2 95%   BMI 29.53 kg/m²     "

## 2017-12-27 NOTE — H&P
CC: History of colon polyps     62 year old male with above. States that symptoms are absent, no alleviating/exacerbating factors. No family history of CA. Positive personal history of polyps. No bleeding or weight loss.     ROS:  No headache, no fever/chills, no chest pain/SOB, no nausea/vomiting/diarrhea/constipation/GI bleeding/abdominal pain, no dysuria/hematuria.    VSSAF   Exam:   Alert and oriented x 3; no apparent distress   PERRLA, sclera anicteric  CV: Regular rate/rhythm, normal PMI   Lungs: Clear bilaterally with no wheeze/rales   Abdomen: Soft, NT/ND, normal bowel sounds   Ext: No cyanosis, clubbing     Impression:   As above    Plan:   Proceed with endoscopy. Further recs to follow.

## 2017-12-27 NOTE — DISCHARGE INSTRUCTIONS
Colonoscopy     A camera attached to a flexible tube with a viewing lens is used to take video pictures.     Colonoscopy is a test to view the inside of your lower digestive tract (colon and rectum). Sometimes it can show the last part of the small intestine (ileum). During the test, small pieces of tissue may be removed for testing. This is called a biopsy. Small growths, such as polyps, may also be removed.   Why is colonoscopy done?  The test is done to help look for colon cancer. And it can help find the source of abdominal pain, bleeding, and changes in bowel habits. It may be needed once a year, depending on factors such as your:  · Age  · Health history  · Family health history  · Symptoms  · Results from any prior colonoscopy  Risks and possible complications  These include:  · Bleeding               · A puncture or tear in the colon   · Risks of anesthesia  · A cancer lesion not being seen  Getting ready   To prepare for the test:  · Talk with your healthcare provider about the risks of the test (see below). Also ask your healthcare provider about alternatives to the test.  · Tell your healthcare provider about any medicines you take. Also tell him or her about any health conditions you may have.  · Make sure your rectum and colon are empty for the test. Follow the diet and bowel prep instructions exactly. If you dont, the test may need to be rescheduled.  · Plan for a friend or family member to drive you home after the test.     Colonoscopy provides an inside view of the entire colon.     You may discuss the results with your doctor right away or at a future visit.  During the test   The test is usually done in the hospital on an outpatient basis. This means you go home the same day. The procedure takes about 30 minutes. During that time:  · You are given relaxing (sedating) medicine through an IV line. You may be drowsy, or fully asleep.  · The healthcare provider will first give you a physical exam to  check for anal and rectal problems.  · Then the anus is lubricated and the scope inserted.  · If you are awake, you may have a feeling similar to needing to have a bowel movement. You may also feel pressure as air is pumped into the colon. Its OK to pass gas during the procedure.  · Biopsy, polyp removal, or other treatments may be done during the test.  After the test   You may have gas right after the test. It can help to try to pass it to help prevent later bloating. Your healthcare provider may discuss the results with you right away. Or you may need to schedule a follow-up visit to talk about the results. After the test, you can go back to your normal eating and other activities. You may be tired from the sedation and need to rest for a few hours.  Date Last Reviewed: 11/1/2016 © 2000-2017 [employer]. [address] [place] [address]. All rights reserved. This information is not intended as a substitute for professional medical care. Always follow your healthcare professional's instructions.      Understanding Colon and Rectal Polyps    The colon (also called the large intestine) is a muscular tube that forms the last part of the digestive tract. It absorbs water and stores food waste. The colon is about 4 to 6 feet long. The rectum is the last 6 inches of the colon. The colon and rectum have a smooth lining composed of millions of cells. Changes in these cells can lead to growths in the colon that can become cancerous and should be removed. Multiple tests are available to screen for colon cancer, but the colonoscopy is the most recommended test. During colonoscopy, these polyps can be removed. How often you need this test depends on many things including your condition, your family history, symptoms, and what the findings were at the previous colonoscopy.   When the colon lining changes  Changes that happen in the cells that line the colon or rectum can lead to growths called polyps.  Over a period of years, polyps can turn cancerous. Removing polyps early may prevent cancer from ever forming.  Polyps  Polyps are fleshy clumps of tissue that form on the lining of the colon or rectum. Small polyps are usually benign (not cancerous). However, over time, cells in a polyp can change and become cancerous. Certain types of polyps known as adenomatous polyps are premalignant. The risk for invasive cancer increases with the size of the polyp and certain cell and gene features. This means that they can become cancerous if they're not removed. Hyperplastic polyps are benign. They can grow quite large and not turn cancerous.   Cancer  Almost all colorectal cancers start when polyp cells begin growing abnormally. As a cancerous tumor grows, it may involve more and more of the colon or rectum. In time, cancer can also grow beyond the colon or rectum and spread to nearby organs or to glands called lymph nodes. The cells can also travel to other parts of the body. This is known as metastasis. The earlier a cancerous tumor is removed, the better the chance of preventing its spread.    Date Last Reviewed: 8/1/2016  © 1355-4517 [employer]. [address] [place] [address]. All rights reserved. This information is not intended as a substitute for professional medical care. Always follow your healthcare professional's instructions.      Understanding Hemorrhoids    Hemorrhoid tissues are cushions of blood vessels that swell slightly during bowel movements. Too much pressure on the anal canal can make these tissues remain enlarged, become inflamed, and cause symptoms. This can happen both inside and outside the anal canal.  Parts of the anal canal  The parts of the anal canal are:  · Internal hemorrhoid tissue is in the upper area of the anal canal.  · The rectum is the last several inches of the colon. This is where stool is stored prior to bowel movements.  · Anal sphincters are  ring-shaped muscles that expand and contract to control the anal opening.  · External hemorrhoid tissue lies under the anal skin.  · The anus is the passage between the rectum and the outside of the body.  Normal hemorrhoid tissue  Hemorrhoid tissues play an important role in helping your body eliminate waste. Food passes from the stomach through the intestines. The waste (stool) then travels through the colon to the rectum. It is stored in the rectum until its ready to be passed from the anus. During bowel movements, hemorrhoids swell with blood and become slightly larger. This swelling helps protect and cushion the anal canal as stool passes from the body. Once the stool has passed, the tissues stop swelling and return to normal.  Problem hemorrhoids  Pressure due to straining or other factors can cause hemorrhoid tissues to remain swollen. When this happens to the hemorrhoid tissues in the anal canal theyre called internal hemorrhoids. Swollen tissues around the anal opening are called external hemorrhoids. Depending on the location, your symptoms can differ.  · Internal hemorrhoids often happen in clusters around the wall of the anal canal. They are usually painless. But they may prolapse (protrude out of the anus) due to straining or pressure from hard stool. After the bowel movement is over, they may then reduce (return inside the body). Internal hemorrhoids often bleed. They can also discharge mucus.  ·   External hemorrhoids are located at the anal opening, just beneath the skin. These tissues rarely cause problems unless they thrombose (form a blood clot). When this happens, a hard, bluish lump may appear. A thrombosed hemorrhoid also causes sudden, severe pain. In time, the clot may go away on its own. This sometimes leaves a skin tag of tissue stretched by the clot.  Hemorrhoid symptoms  Hemorrhoid symptoms may include:  · Pain or a burning sensation  · Bleeding during bowel movements  · Protrusion of  tissue from the anus  · Itching around the anus  Causes of hemorrhoids  Theres no single cause of hemorrhoids. Most often, though, they are caused by too much pressure on the anal canal. This can be due to:  · Chronic (ongoing) constipation  · Straining during bowel movements  · Sitting too long on the toilet  · Strenuous exercise or heavy lifting  · Pregnancy and childbirth  · Aging  · Diarrhea  Date Last Reviewed: 7/1/2016 © 2000-2017 [employer]. [address] [place] [address]. All rights reserved. This information is not intended as a substitute for professional medical care. Always follow your healthcare professional's instructions.        Eating a High-Fiber Diet  Fiber is what gives strength and structure to plants. Most grains, beans, vegetables, and fruits contain fiber. Foods rich in fiber are often low in calories and fat, and they fill you up more. They may also reduce your risks for certain health problems. To find out the amount of fiber in canned, packaged, or frozen foods, read the Nutrition Facts label. It tells you how much fiber is in a serving.    Types of fiber and their benefits  There are two types of fiber: insoluble and soluble. They both aid digestion and help you maintain a healthy weight.  · Insoluble fiber. This is found in whole grains, cereals, certain fruits and vegetables such as apple skin, corn, and carrots. Insoluble fiber may prevent constipation and reduce the risk for certain types of cancer.  · Soluble fiber. This type of fiber is in oats, beans, and certain fruits and vegetables such as strawberries and peas. Soluble fiber can reduce cholesterol, which may help lower the risk for heart disease. It also helps control blood sugar levels.  Look for high-fiber foods  Try these foods to add fiber to your diet:  · Whole-grain breads and cereals. Try to eat 6 to 8 ounces a day. Include wheat and oat bran cereals, whole-wheat muffins or toast, and corn  tortillas in your meals.  · Fruits. Try to eat 2 cups a day. Apples, oranges, strawberries, pears, and bananas are good sources. (Note: Fruit juice is low in fiber.)  · Vegetables. Try to eat at least 2.5 cups a day. Add asparagus, carrots, broccoli, peas, and corn to your meals.  · Beans. One cup of cooked lentils gives you over 15 grams of fiber. Try navy beans, lentils, and chickpeas.  · Seeds. A small handful of seeds gives you about 3 grams of fiber. Try sunflower seeds.  Keep track of your fiber  Keep track of how much fiber you eat. Start by reading food labels. Then eat a variety of foods high in fiber. As you begin to eat more fiber, ask your healthcare provider how much water you should be drinking to keep your digestive system working smoothly.  You should aim for a certain amount of fiber in your diet each day. If you are a woman, that amount is between 25 and 28 grams per day. Men should aim for 30 to 33 grams per day. After age 50, your daily fiber needs drop to 22 grams for women and 28 grams for men.  Before you reach for the fiber supplements, think about this. Fiber is found naturally in healthy whole foods. It gives you that feeling of fullness after you eat. Taking fiber supplements or eating fiber-enriched foods will not give you this full feeling.  Your fiber intake is a good measure for the quality of your overall diet. If you are missing out on your daily amount of fiber, you may be lacking other important nutrients as well.  Date Last Reviewed: 5/11/2015  © 4788-6280 [employer]. [address] [place] [address]. All rights reserved. This information is not intended as a substitute for professional medical care. Always follow your healthcare professional's instructions.      High-Fiber Diet  Fiber is in fruits, vegetables, cereals, and grains. Fiber passes through your body undigested. A high-fiber diet helps food move through your intestinal tract. The added bulk is  helpful in preventing constipation. In people with diverticulosis, fiber helps clean out the pouches along the colon wall. It also prevents new pouches from forming. A high-fiber diet reduces the risk of colon cancer. It also lowers blood cholesterol and prevents high blood sugar in people with diabetes.    The fiber-rich foods listed below should be part of your diet. If you are not used to high-fiber foods, start with 1 or 2 foods from this list. Every 3 to 4 days add a new one to your diet. Do this until you are eating 4 high-fiber foods per day. This should give you 20 to 35 grams of fiber a day. It is also important to drink a lot of water when you are on this diet. You should have 6 to 8 glasses of water a day. Water makes the fiber swell and increases the benefit.  Foods high in dietary fiber  The following foods are high in dietary fiber:  · Breads. Breads made with 100% whole-wheat flour; dulce, wheat, or rye crackers; whole-grain tortillas, bran muffins.  · Cereals. Whole-grain and bran cereals with bran (shredded wheat, wheat flakes, raisin bran, corn bran); oatmeal, rolled oats, granola, and brown rice.  · Fruits. Fresh fruits and their edible skins (pears, prunes, raisins, berries, apples, and apricots); bananas, citrus fruit, mangoes, pineapple; and prune juice.  · Nuts. Any nuts and seeds.  · Vegetables. Best served raw or lightly cooked. All types, especially: green peas, celery, eggplant, potatoes, spinach, broccoli, [place] sprouts, winter squash, carrots, cauliflower, soybeans, lentils, and fresh and dried beans of all kinds.  · Other. Popcorn, any spices.  Date Last Reviewed: 8/1/2016  © 0389-3572 [employer]. [address] [place] [address], [place] [address]. All rights reserved. This information is not intended as a substitute for professional medical care. Always follow your healthcare professional's instructions.      Discharge Instructions: Eating a High-Fiber Diet  Your health care  provider has prescribed a high-fiber diet for you. Fiber is what gives strength and structure to plants. Most grains, beans, vegetables, and fruits contain fiber. Foods rich in fiber are often low in calories and fat, but they fill you up more. These foods may also reduce the risk of certain health problems.  There are two types of fiber:  · Insoluble fiber. This is found in whole grains, cereals, and certain fruits and vegetables (such as apple skins, corn, and beans). Insoluble fiber is made up mainly of plant cell walls. It may prevent constipation and reduce the risk of certain types of cancer.  · Soluble fiber. This type of fiber is found in oats, beans, nuts, and certain fruits and vegetables (such as strawberries and peas). Soluble fiber turns to gel in the digestive system, slowing the movement of the digestive tract. It helps control blood sugar levels and can reduce cholesterol, which may help lower the risk of heart disease. Soluble fiber can also help control appetite.     Home care  · Know how much fiber you need a day. The recommended daily amount of fiber is 25 grams for women and 38 grams for men. After age 50, daily fiber needs drop to 21 grams for women and 30 grams for men.  · Ask your doctor about a fiber supplement. (Always take fiber supplements with a large glass of water.)  · Keep track of how much fiber you eat.  · Eat a variety of foods high in fiber.  · Learn to read and understand food labels.  · Ask your healthcare provider how much water you should be drinking.  · Look for these high-fiber foods:  ¨ Whole-grain breads and cereals  § 6 ounces a day give you about 18 grams of fiber (1 ounce is equal to 1 slice of bread, 1 cup of dry cereal, or 1/2 cup of cooked rice).  § Include wheat and oat bran cereals, whole-wheat muffins or toast, and corn tortillas in your meals.  ¨ Fruits   § 2 cups a day give you about 8 grams of fiber.  § Apples, oranges, strawberries, pears, and bananas are good  sources.  § Fruit juice does not contain as much fiber as the fruit it was made from.  ¨ Vegetables  § 2½ cups a day give you about 11 grams of fiber. Add asparagus, carrots, broccoli, peas, and corn to your meals.  ¨ Legumes  § 1/4 cup a day (in place of meat) gives you about 4 grams of fiber. Try navy beans, lentils, chickpeas, and soybeans.  ¨ Seeds   § A small handful of seeds gives you about 3 grams of fiber. Try sunflower seeds.  Follow-up  Make a follow-up appointment with a nutritionist as directed by our staff.  Date Last Reviewed: 6/1/2015  © 8243-7014 [employer]. [address] [place] [address], [place] [address]. All rights reserved. This information is not intended as a substitute for professional medical care. Always follow your healthcare professional's instructions.

## 2017-12-28 VITALS
HEIGHT: 74 IN | BODY MASS INDEX: 29.52 KG/M2 | SYSTOLIC BLOOD PRESSURE: 134 MMHG | WEIGHT: 230 LBS | TEMPERATURE: 98 F | OXYGEN SATURATION: 100 % | HEART RATE: 58 BPM | RESPIRATION RATE: 20 BRPM | DIASTOLIC BLOOD PRESSURE: 83 MMHG

## 2017-12-29 ENCOUNTER — PATIENT MESSAGE (OUTPATIENT)
Dept: GASTROENTEROLOGY | Facility: CLINIC | Age: 62
End: 2017-12-29

## 2018-01-24 ENCOUNTER — OFFICE VISIT (OUTPATIENT)
Dept: OTOLARYNGOLOGY | Facility: CLINIC | Age: 63
End: 2018-01-24
Payer: MEDICAID

## 2018-01-24 VITALS
WEIGHT: 233 LBS | DIASTOLIC BLOOD PRESSURE: 68 MMHG | SYSTOLIC BLOOD PRESSURE: 130 MMHG | BODY MASS INDEX: 29.9 KG/M2 | HEIGHT: 74 IN

## 2018-01-24 DIAGNOSIS — H53.9 VISUAL DISTURBANCE: Primary | ICD-10-CM

## 2018-01-24 DIAGNOSIS — H81.02 ACTIVE COCHLEOVESTIBULAR MENIERE'S DISEASE OF LEFT EAR: ICD-10-CM

## 2018-01-24 DIAGNOSIS — H90.3 SENSORINEURAL HEARING LOSS (SNHL) OF BOTH EARS: ICD-10-CM

## 2018-01-24 PROCEDURE — 99214 OFFICE O/P EST MOD 30 MIN: CPT | Mod: S$PBB,,, | Performed by: OTOLARYNGOLOGY

## 2018-01-24 PROCEDURE — 99999 PR PBB SHADOW E&M-EST. PATIENT-LVL III: CPT | Mod: PBBFAC,,, | Performed by: OTOLARYNGOLOGY

## 2018-01-24 PROCEDURE — 99213 OFFICE O/P EST LOW 20 MIN: CPT | Mod: PBBFAC,PO | Performed by: OTOLARYNGOLOGY

## 2018-01-24 NOTE — PROGRESS NOTES
Subjective:       Patient ID: Ad Lyons is a 63 y.o. male.    Chief Complaint: Follow-up and Dizziness    Ad is here for follow-up of Meniere's. No vertigo episodes - cutting salt out and seems to be doing well.     Feels hearing is progressing, moreso on left than right.   He lost his hearing aid.    Is still having visual disturbance where he sees some episodic duplicates of images at night. Was told by his Optometrist that was a migraine variant. He only rarely has headaches.    MRI IAC was normal done Jane 28 2017    Review of Systems   Constitutional: Negative for activity change and appetite change.   Respiratory: Negative for difficulty breathing and wheezing   Cardiovascular: Negative for chest pain.      Objective:        Constitutional:   Vital signs are normal. He appears well-developed and well-nourished.     Head:  Normocephalic and atraumatic.     Ears:  Right ear hearing normal to normal and whispered voice; external ear normal without scars, lesions, or masses; ear canal, tympanic membrane, and middle ear normal. and left ear hearing normal to normal and whispered voice; external ear normal without scars, lesions, or masses; ear canal, tympanic membrane, and middle ear normal..     Nose:  Nose normal including turbinates, nasal mucosa, sinuses and nasal septum.         Tests / Results:  None new    Assessment:       1. Visual disturbance    2. Active cochleovestibular Meniere's disease of left ear    3. Sensorineural hearing loss (SNHL) of both ears          Plan:         Hearing is subjectively worse on left. Expect some progression with time given Meniere's  Vertigo controlled with diet - continue, avoid Dyazide due to Kidney stones  Hearing aid candidate. Schedule consultation  He is requesting a referral to Neurology because he was told his visual issues are related to a neurologic problem. I am not sure this is the case, but the Optometrist he saw said that vision was not an issue. a  referral was made.   Fu with me 6 months

## 2018-01-29 ENCOUNTER — TELEPHONE (OUTPATIENT)
Dept: FAMILY MEDICINE | Facility: CLINIC | Age: 63
End: 2018-01-29

## 2018-01-29 NOTE — TELEPHONE ENCOUNTER
Called pt regarding below message. Pt would like to schedule annual exam. Dr. Carbajal does not have any appointments available. Pt is comfortable seeing Christy NP. Appt date, time, and location given. Pt verbalized understanding.     ----- Message from Odessa Corbin sent at 1/29/2018  1:24 PM CST -----  Contact: [phone]  Patient requesting to schedule annual appointment.Patient states he was diagnosed with meniers disease.  No available future appointmentsThanks!

## 2018-02-08 ENCOUNTER — CLINICAL SUPPORT (OUTPATIENT)
Dept: AUDIOLOGY | Facility: CLINIC | Age: 63
End: 2018-02-08
Payer: MEDICAID

## 2018-02-08 DIAGNOSIS — Z71.89 ENCOUNTER FOR HEARING AID CONSULTATION: Primary | ICD-10-CM

## 2018-02-08 NOTE — PROGRESS NOTES
HA Consult and in office demo completed with patient. Discussed features and technology levels for [employer] and [employer] BTE hearing aids and pt listened to a pair of [employer] B90-R aids in the office during his visit.  Pt was provided with information regarding both company's hearing aids and will call back if/when he is ready to order hearing aids. Pt is most interested in the standard (50s) and advanced (70s) technology level. Informed pt that the audiogram he had completed on 9/26/17 will need to be repeated if he moves forward with hearing aids after 3/26/18 due to the six month time frame. Pt verbalized his understanding of this.  He will call back with any questions.

## 2018-02-16 ENCOUNTER — OFFICE VISIT (OUTPATIENT)
Dept: FAMILY MEDICINE | Facility: CLINIC | Age: 63
End: 2018-02-16
Payer: MEDICAID

## 2018-02-16 VITALS
SYSTOLIC BLOOD PRESSURE: 118 MMHG | HEART RATE: 67 BPM | HEIGHT: 74 IN | BODY MASS INDEX: 29.9 KG/M2 | WEIGHT: 233 LBS | DIASTOLIC BLOOD PRESSURE: 78 MMHG | TEMPERATURE: 98 F

## 2018-02-16 DIAGNOSIS — Z79.899 HIGH RISK MEDICATION USE: ICD-10-CM

## 2018-02-16 DIAGNOSIS — Z00.00 ANNUAL PHYSICAL EXAM: Primary | ICD-10-CM

## 2018-02-16 DIAGNOSIS — Z13.220 SCREENING FOR LIPID DISORDERS: ICD-10-CM

## 2018-02-16 DIAGNOSIS — Z23 NEED FOR IMMUNIZATION AGAINST INFLUENZA: ICD-10-CM

## 2018-02-16 DIAGNOSIS — K21.9 GASTROESOPHAGEAL REFLUX DISEASE, ESOPHAGITIS PRESENCE NOT SPECIFIED: ICD-10-CM

## 2018-02-16 DIAGNOSIS — K64.4 EXTERNAL HEMORRHOID: ICD-10-CM

## 2018-02-16 DIAGNOSIS — Z13.1 SCREENING FOR DIABETES MELLITUS: ICD-10-CM

## 2018-02-16 DIAGNOSIS — R35.1 NOCTURIA: ICD-10-CM

## 2018-02-16 DIAGNOSIS — K22.70 BARRETT'S ESOPHAGUS WITHOUT DYSPLASIA: ICD-10-CM

## 2018-02-16 DIAGNOSIS — H81.02 ACTIVE COCHLEOVESTIBULAR MENIERE'S DISEASE OF LEFT EAR: ICD-10-CM

## 2018-02-16 DIAGNOSIS — H90.3 SENSORINEURAL HEARING LOSS (SNHL) OF BOTH EARS: ICD-10-CM

## 2018-02-16 DIAGNOSIS — R53.83 FATIGUE, UNSPECIFIED TYPE: ICD-10-CM

## 2018-02-16 PROCEDURE — 99214 OFFICE O/P EST MOD 30 MIN: CPT | Mod: PBBFAC,PO | Performed by: NURSE PRACTITIONER

## 2018-02-16 PROCEDURE — 99999 PR PBB SHADOW E&M-EST. PATIENT-LVL IV: CPT | Mod: PBBFAC,,, | Performed by: NURSE PRACTITIONER

## 2018-02-16 PROCEDURE — 90686 IIV4 VACC NO PRSV 0.5 ML IM: CPT | Mod: PBBFAC,PO

## 2018-02-16 PROCEDURE — 99396 PREV VISIT EST AGE 40-64: CPT | Mod: S$PBB,,, | Performed by: NURSE PRACTITIONER

## 2018-02-16 RX ORDER — SUCRALFATE 1 G/1
1 TABLET ORAL
Qty: 120 TABLET | Refills: 5 | Status: SHIPPED | OUTPATIENT
Start: 2018-02-16 | End: 2019-07-01 | Stop reason: SDUPTHER

## 2018-02-16 NOTE — PROGRESS NOTES
Subjective:       Patient ID: Ad Lyons is a 63 y.o. male.    Chief Complaint: Annual Exam    HPI   Chief Complaint  Chief Complaint   Patient presents with    Annual Exam       HPI  Ad Lyons is a 63 y.o. male with medical diagnoses as listed in the medical history and problem list that presents for an annual exam. Patient regularly treated for GERD and Mills's esophagus by Dr. Castillo.  Patient had an endoscopy in August 2017.  Patient also had a colonoscopy in 12/2017. Patient has been to ENT Dr. Castro for vertigo and hearing loss and was diagnosed with Meniere's disease.  BMI today is 29.92 and weight is stable at 233 pounds. Established patient with last clinic appointment 9/20/2017.        PAST MEDICAL HISTORY:  Past Medical History:   Diagnosis Date    Mills's esophagus     DDD (degenerative disc disease), cervical     Ear ringing     Gallbladder disease     Gastric ulcer with perforation     GERD (gastroesophageal reflux disease)     Kidney stone     Vertigo        PAST SURGICAL HISTORY:  Past Surgical History:   Procedure Laterality Date    ADENOIDECTOMY      CHOLECYSTECTOMY      COLONOSCOPY N/A 12/27/2017    Procedure: COLONOSCOPY;  Surgeon: Adrian Castillo MD;  Location: [facility];  Service: Endoscopy;  Laterality: N/A;    COLONOSCOPY W/ POLYPECTOMY  1/2013    Dr Hughes    ERCP  12/26/2016    [facility] note    ESOPHAGOGASTRODUODENOSCOPY  1/2013    Dr Hughes    TONSILLECTOMY      VASECTOMY         SOCIAL HISTORY:  Social History     Social History    Marital status:      Spouse name: N/A    Number of children: N/A    Years of education: N/A     Occupational History     [employer]     Social History Main Topics    Smoking status: Never Smoker    Smokeless tobacco: Never Used    Alcohol use No    Drug use: No    Sexual activity: Not on file     Other Topics Concern    Not on file     Social History Narrative    No narrative  on file       FAMILY HISTORY:  Family History   Problem Relation Age of Onset    Heart disease Father     No Known Problems Mother     No Known Problems Sister     Cancer Brother      possible colon cancer    No Known Problems Daughter     No Known Problems Son     Cancer Sister      breast cancer    No Known Problems Daughter     No Known Problems Son        ALLERGIES AND MEDICATIONS: updated and reviewed.  Review of patient's allergies indicates:   Allergen Reactions    Aspirin Other (See Comments)     Stomach ulcers     Current Outpatient Prescriptions   Medication Sig Dispense Refill    chlorhexidine (PERIDEX) 0.12 % solution RINSE WITH 1/2 OZ. ONCE DAILY FOR 5 DAYS 473 mL 0    cyanocobalamin, vitamin B-12, (VITAMIN B-12) 1,000 mcg Subl Place under the tongue.      fluticasone (FLONASE) 50 mcg/actuation nasal spray INHALE 1 SPRAY INTO EACH NOSTRIL ONCE DAILY 16 g 3    omeprazole (PRILOSEC) 40 MG capsule TAKE 1 CAPSULE (40 MG TOTAL) BY MOUTH ONCE DAILY. 90 capsule 2    sucralfate (CARAFATE) 1 gram tablet Take 1 tablet (1 g total) by mouth 4 (four) times daily before meals and nightly. Dissolve pill in 2 tablespoons water and drink 120 tablet 5     No current facility-administered medications for this visit.      Review of Systems   Constitutional: Positive for fatigue. Negative for appetite change, chills and fever.        C/O slight fatigue   HENT: Positive for congestion, ear discharge and sore throat. Negative for postnasal drip, rhinorrhea, sinus pain and sinus pressure.         Slight sore throat, nasal congestion, and feels like ears drain at night   Eyes: Negative for visual disturbance.        Vision corrected with glasses, sees Dr. Marshall for eye exam, has noted light circles of dots, told by eye doctor that this is neurologic, possible ocular migraine   Respiratory: Positive for shortness of breath. Negative for cough.         Patient c/o shortness of breath with exertion like stairs  "  Cardiovascular: Negative for chest pain, palpitations and leg swelling.   Gastrointestinal: Positive for abdominal pain and diarrhea. Negative for constipation, nausea and vomiting.        Occasional fleeting abdominal pain to right side, describes as a cramp. GERD symptoms mostly at night or first thing in morning, mostly resolved with prilosec, has not been using carafate; chronic diarrhea since gall bladder removed, treats with imodium   Genitourinary: Negative for difficulty urinating and urgency.        Nocturia 2 times each night, some weak stream noted   Musculoskeletal: Positive for arthralgias. Negative for myalgias.        Some arthritis pain to some joints, nothing particular bothering today, gets back aches and stiff necks   Skin: Negative for rash and wound.   Neurological: Positive for numbness. Negative for dizziness and headaches.        No dizziness since August 2017, numbness to index finger of right hand since cervical disc injury   Hematological: Negative for adenopathy. Does not bruise/bleed easily.   Psychiatric/Behavioral: Negative for dysphoric mood, sleep disturbance and suicidal ideas. The patient is not nervous/anxious.        Objective:       Vitals:    02/16/18 1330   BP: 118/78   BP Location: Right arm   Patient Position: Sitting   BP Method: Large (Automatic)   Pulse: 67   Temp: 98 °F (36.7 °C)   TempSrc: Oral   Weight: 105.7 kg (233 lb)   Height: 6' 2" (1.88 m)     Physical Exam   Constitutional: He is oriented to person, place, and time. Vital signs are normal. He appears well-developed and well-nourished. No distress.   HENT:   Head: Normocephalic and atraumatic.   Right Ear: Tympanic membrane, external ear and ear canal normal.   Left Ear: External ear and ear canal normal. Tympanic membrane is erythematous.   Nose: No mucosal edema. Right sinus exhibits frontal sinus tenderness. Right sinus exhibits no maxillary sinus tenderness. Left sinus exhibits no maxillary sinus tenderness " and no frontal sinus tenderness.   Mouth/Throat: Oropharynx is clear and moist and mucous membranes are normal. No oropharyngeal exudate.   Mild erythema noted to left TM  Right frontal sinus pain with palpation   Eyes: Conjunctivae and lids are normal. Right eye exhibits no discharge. Left eye exhibits no discharge. Right conjunctiva is not injected. Left conjunctiva is not injected.   Neck: Normal range of motion. Neck supple. Normal carotid pulses present. Carotid bruit is not present. No thyromegaly present.   Cardiovascular: Normal rate, regular rhythm, S1 normal, S2 normal, normal heart sounds, intact distal pulses and normal pulses.    No murmur heard.  Pulses:       Carotid pulses are 2+ on the right side, and 2+ on the left side.       Radial pulses are 2+ on the right side, and 2+ on the left side.        Posterior tibial pulses are 2+ on the right side, and 2+ on the left side.   No edema   Pulmonary/Chest: Effort normal and breath sounds normal. No respiratory distress. He has no decreased breath sounds. He has no wheezes. He has no rhonchi. He has no rales.   Abdominal: Soft. Bowel sounds are normal. He exhibits no distension, no abdominal bruit, no pulsatile midline mass and no mass. There is no hepatosplenomegaly. There is tenderness in the right upper quadrant and right lower quadrant. No hernia.       Genitourinary: Prostate normal. Rectal exam shows external hemorrhoid. Rectal exam shows no mass, no tenderness and anal tone normal. Prostate is not enlarged and not tender.   Genitourinary Comments: Skin tag external hemorrhoid noted, prostate smooth, no nodules noted, not enlarged   Musculoskeletal: Normal range of motion. He exhibits no edema, tenderness or deformity.   Negative SLR bilateral   Lymphadenopathy:        Head (right side): No submental, no submandibular, no tonsillar, no preauricular, no posterior auricular and no occipital adenopathy present.        Head (left side): No submental, no  submandibular, no tonsillar, no preauricular, no posterior auricular and no occipital adenopathy present.     He has no cervical adenopathy.   Neurological: He is alert and oriented to person, place, and time. He has normal strength.   Skin: Skin is warm, dry and intact. Capillary refill takes less than 2 seconds. No rash noted. He is not diaphoretic. No pallor.   Psychiatric: He has a normal mood and affect. His speech is normal and behavior is normal. Judgment and thought content normal. His mood appears not anxious. Cognition and memory are normal. He does not exhibit a depressed mood.   Nursing note and vitals reviewed.      Assessment:       1. Annual physical exam    2. Gastroesophageal reflux disease, esophagitis presence not specified    3. Mills's esophagus without dysplasia    4. Nocturia    5. Active cochleovestibular Meniere's disease of left ear    6. Sensorineural hearing loss (SNHL) of both ears    7. High risk medication use    8. Screening for lipid disorders    9. Screening for diabetes mellitus    10. Fatigue, unspecified type    11. External hemorrhoid    12. BMI 29.0-29.9,adult    13. Need for immunization against influenza        Plan:   Ad was seen today for annual exam.  Discussed healthy diet, regular exercise, necessary labs, age appropriate cancer screening, and routine vaccinations.  Patient advised that he needs to receive his Td immunization at the pharmacy.    Diagnoses and all orders for this visit:    Annual physical exam  -     CBC auto differential; Future  -     Comprehensive metabolic panel; Future  -     Lipid panel; Future  -     TSH; Future  -     Prostate Specific Antigen, Diagnostic; Future  - Educational handouts provided. Prevention Guidelines men age 50-64    Gastroesophageal reflux disease, esophagitis presence not specified  -     sucralfate (CARAFATE) 1 gram tablet; Take 1 tablet (1 g total) by mouth 4 (four) times daily before meals and nightly. Dissolve pill in  2 tablespoons water and drink  - continue omeprazole 40 mg daily    Mills's esophagus without dysplasia  -     sucralfate (CARAFATE) 1 gram tablet; Take 1 tablet (1 g total) by mouth 4 (four) times daily before meals and nightly. Dissolve pill in 2 tablespoons water and drink  - Continue omeprazole 40 mg daily    Nocturia  -     Prostate Specific Antigen, Diagnostic; Future    Active cochleovestibular Meniere's disease of left ear   Continue follow up with Dr. Castro as directed  Sensorineural hearing loss (SNHL) of both ears   Hearing aids recommended but not covered by insurance  High risk medication use  -     CBC auto differential; Future  -     Comprehensive metabolic panel; Future    Screening for lipid disorders  -     Lipid panel; Future    Screening for diabetes mellitus  -     Comprehensive metabolic panel; Future    Fatigue, unspecified type  -     CBC auto differential; Future  -     TSH; Future    External hemorrhoid   Stable, non tender, no bleeding, no treatment needed at this time  BMI 29.0-29.9,adult   Weight loss recommended with well balanced diet and portion controlled meals and increased activity.   Need for immunization against influenza  -     Influenza - Quadrivalent (3 years & older) (PF)    Follow-up in about 1 year (around 2/16/2019) for Annual Exam.

## 2018-02-16 NOTE — PATIENT INSTRUCTIONS

## 2018-02-17 ENCOUNTER — LAB VISIT (OUTPATIENT)
Dept: LAB | Facility: HOSPITAL | Age: 63
End: 2018-02-17
Attending: NURSE PRACTITIONER
Payer: MEDICAID

## 2018-02-17 DIAGNOSIS — R53.83 FATIGUE, UNSPECIFIED TYPE: ICD-10-CM

## 2018-02-17 DIAGNOSIS — Z13.1 SCREENING FOR DIABETES MELLITUS: ICD-10-CM

## 2018-02-17 DIAGNOSIS — R35.1 NOCTURIA: ICD-10-CM

## 2018-02-17 DIAGNOSIS — Z00.00 ANNUAL PHYSICAL EXAM: ICD-10-CM

## 2018-02-17 DIAGNOSIS — Z79.899 HIGH RISK MEDICATION USE: ICD-10-CM

## 2018-02-17 DIAGNOSIS — Z13.220 SCREENING FOR LIPID DISORDERS: ICD-10-CM

## 2018-02-17 LAB
ALBUMIN SERPL BCP-MCNC: 3.8 G/DL
ALP SERPL-CCNC: 67 U/L
ALT SERPL W/O P-5'-P-CCNC: 18 U/L
ANION GAP SERPL CALC-SCNC: 8 MMOL/L
AST SERPL-CCNC: 16 U/L
BASOPHILS # BLD AUTO: 0.05 K/UL
BASOPHILS NFR BLD: 0.7 %
BILIRUB SERPL-MCNC: 0.8 MG/DL
BUN SERPL-MCNC: 15 MG/DL
CALCIUM SERPL-MCNC: 9.1 MG/DL
CHLORIDE SERPL-SCNC: 106 MMOL/L
CHOLEST SERPL-MCNC: 182 MG/DL
CHOLEST/HDLC SERPL: 4.6 {RATIO}
CO2 SERPL-SCNC: 27 MMOL/L
COMPLEXED PSA SERPL-MCNC: 1.5 NG/ML
CREAT SERPL-MCNC: 1 MG/DL
DIFFERENTIAL METHOD: NORMAL
EOSINOPHIL # BLD AUTO: 0.5 K/UL
EOSINOPHIL NFR BLD: 6.9 %
ERYTHROCYTE [DISTWIDTH] IN BLOOD BY AUTOMATED COUNT: 13.5 %
EST. GFR  (AFRICAN AMERICAN): >60 ML/MIN/1.73 M^2
EST. GFR  (NON AFRICAN AMERICAN): >60 ML/MIN/1.73 M^2
GLUCOSE SERPL-MCNC: 89 MG/DL
HCT VFR BLD AUTO: 44 %
HDLC SERPL-MCNC: 40 MG/DL
HDLC SERPL: 22 %
HGB BLD-MCNC: 14.8 G/DL
IMM GRANULOCYTES # BLD AUTO: 0.01 K/UL
IMM GRANULOCYTES NFR BLD AUTO: 0.1 %
LDLC SERPL CALC-MCNC: 109.8 MG/DL
LYMPHOCYTES # BLD AUTO: 2.2 K/UL
LYMPHOCYTES NFR BLD: 32.8 %
MCH RBC QN AUTO: 29.1 PG
MCHC RBC AUTO-ENTMCNC: 33.6 G/DL
MCV RBC AUTO: 87 FL
MONOCYTES # BLD AUTO: 0.4 K/UL
MONOCYTES NFR BLD: 5.9 %
NEUTROPHILS # BLD AUTO: 3.6 K/UL
NEUTROPHILS NFR BLD: 53.6 %
NONHDLC SERPL-MCNC: 142 MG/DL
NRBC BLD-RTO: 0 /100 WBC
PLATELET # BLD AUTO: 232 K/UL
PMV BLD AUTO: 10 FL
POTASSIUM SERPL-SCNC: 4.4 MMOL/L
PROT SERPL-MCNC: 7 G/DL
RBC # BLD AUTO: 5.08 M/UL
SODIUM SERPL-SCNC: 141 MMOL/L
TRIGL SERPL-MCNC: 161 MG/DL
TSH SERPL DL<=0.005 MIU/L-ACNC: 2.61 UIU/ML
WBC # BLD AUTO: 6.8 K/UL

## 2018-02-17 PROCEDURE — 85025 COMPLETE CBC W/AUTO DIFF WBC: CPT

## 2018-02-17 PROCEDURE — 84153 ASSAY OF PSA TOTAL: CPT

## 2018-02-17 PROCEDURE — 36415 COLL VENOUS BLD VENIPUNCTURE: CPT | Mod: PO

## 2018-02-17 PROCEDURE — 80061 LIPID PANEL: CPT

## 2018-02-17 PROCEDURE — 80053 COMPREHEN METABOLIC PANEL: CPT

## 2018-02-17 PROCEDURE — 84443 ASSAY THYROID STIM HORMONE: CPT

## 2018-02-19 ENCOUNTER — TELEPHONE (OUTPATIENT)
Dept: FAMILY MEDICINE | Facility: CLINIC | Age: 63
End: 2018-02-19

## 2018-02-19 NOTE — TELEPHONE ENCOUNTER
Patient declines to begin a statin medication at this time.  He will work on diet and exercise.    ----- Message from Christy Boyce NP sent at 2/19/2018  7:47 AM CST -----  ##Please call and see if he is willing to start a statin##    All cholesterol levels are normal.  The triglycerides are slightly elevated at 161, goal is <150.  A low fat diet, weight loss and exercise are recommended to decrease triglycerides.   The 10-year ASCVD risk score ([employer] Mountainstephan GILMAN Jr., et al., 2013) is: 11%.  This indicates an elevated calculated atherosclerotic cardiovascular risk.  When this risk is greater than 7.5%, it is recommended that a medication called a statin be taken to lower cardiovascular risk and decrease cholesterol.  Are you willing to start a medication at this time?     Values used to calculate the score:      Age: 63 years      Sex: Male      Is Non- : No      Diabetic: No      Tobacco smoker: No      Systolic Blood Pressure: 118 mmHg      Is BP treated: No      HDL Cholesterol: 40 mg/dL      Total Cholesterol: 182 mg/dL  If you have any questions, please call or email the office.  Thank you.  Christy

## 2018-03-26 ENCOUNTER — TELEPHONE (OUTPATIENT)
Dept: FAMILY MEDICINE | Facility: CLINIC | Age: 63
End: 2018-03-26

## 2018-03-26 ENCOUNTER — DOCUMENTATION ONLY (OUTPATIENT)
Dept: FAMILY MEDICINE | Facility: CLINIC | Age: 63
End: 2018-03-26

## 2018-03-26 NOTE — PROGRESS NOTES
Pre-Visit Chart Review  For Appointment Scheduled on 3-28-18    Health Maintenance Due   Topic Date Due    TETANUS VACCINE  01/15/1973

## 2018-03-26 NOTE — TELEPHONE ENCOUNTER
----- Message from Hiwot Pierce sent at 3/26/2018 10:04 AM CDT -----  Contact: [phone] self  Patient came to  earlier this am, wanted to make appt with anyone for pain in the stomach/liver area.  There were no appointments available, I asked patient if he wanted to send a message and he said he would just go over to gastro dept.  Patient came back to , went to gastro and they told him he needed to get a referral from Dr. Carbajal for him to see a hematologist.  Please put in referral and call patient at [phone].  Patient has Medicaid [employer].

## 2018-03-28 ENCOUNTER — OFFICE VISIT (OUTPATIENT)
Dept: FAMILY MEDICINE | Facility: CLINIC | Age: 63
End: 2018-03-28
Attending: FAMILY MEDICINE
Payer: MEDICAID

## 2018-03-28 ENCOUNTER — LAB VISIT (OUTPATIENT)
Dept: LAB | Facility: HOSPITAL | Age: 63
End: 2018-03-28
Attending: FAMILY MEDICINE
Payer: MEDICAID

## 2018-03-28 VITALS
OXYGEN SATURATION: 96 % | RESPIRATION RATE: 20 BRPM | TEMPERATURE: 99 F | SYSTOLIC BLOOD PRESSURE: 110 MMHG | WEIGHT: 226.88 LBS | HEART RATE: 67 BPM | DIASTOLIC BLOOD PRESSURE: 70 MMHG | HEIGHT: 74 IN | BODY MASS INDEX: 29.12 KG/M2

## 2018-03-28 DIAGNOSIS — R10.11 RUQ PAIN: Primary | ICD-10-CM

## 2018-03-28 DIAGNOSIS — K90.89 BILE SALT-INDUCED DIARRHEA: ICD-10-CM

## 2018-03-28 DIAGNOSIS — R10.11 RUQ PAIN: ICD-10-CM

## 2018-03-28 DIAGNOSIS — K22.70 BARRETT'S ESOPHAGUS WITHOUT DYSPLASIA: ICD-10-CM

## 2018-03-28 LAB
ALBUMIN SERPL BCP-MCNC: 4.2 G/DL
ALP SERPL-CCNC: 68 U/L
ALT SERPL W/O P-5'-P-CCNC: 25 U/L
AMYLASE SERPL-CCNC: 52 U/L
AST SERPL-CCNC: 18 U/L
BASOPHILS # BLD AUTO: 0.04 K/UL
BASOPHILS NFR BLD: 0.6 %
BILIRUB DIRECT SERPL-MCNC: 0.3 MG/DL
BILIRUB SERPL-MCNC: 0.9 MG/DL
DIFFERENTIAL METHOD: NORMAL
EOSINOPHIL # BLD AUTO: 0.4 K/UL
EOSINOPHIL NFR BLD: 6.2 %
ERYTHROCYTE [DISTWIDTH] IN BLOOD BY AUTOMATED COUNT: 13.4 %
HCT VFR BLD AUTO: 45.4 %
HGB BLD-MCNC: 15.2 G/DL
IMM GRANULOCYTES # BLD AUTO: 0.02 K/UL
IMM GRANULOCYTES NFR BLD AUTO: 0.3 %
LIPASE SERPL-CCNC: 15 U/L
LYMPHOCYTES # BLD AUTO: 2.5 K/UL
LYMPHOCYTES NFR BLD: 36.2 %
MCH RBC QN AUTO: 29.2 PG
MCHC RBC AUTO-ENTMCNC: 33.5 G/DL
MCV RBC AUTO: 87 FL
MONOCYTES # BLD AUTO: 0.5 K/UL
MONOCYTES NFR BLD: 7.1 %
NEUTROPHILS # BLD AUTO: 3.4 K/UL
NEUTROPHILS NFR BLD: 49.6 %
NRBC BLD-RTO: 0 /100 WBC
PLATELET # BLD AUTO: 219 K/UL
PMV BLD AUTO: 10 FL
PROT SERPL-MCNC: 7.1 G/DL
RBC # BLD AUTO: 5.2 M/UL
WBC # BLD AUTO: 6.9 K/UL

## 2018-03-28 PROCEDURE — 85025 COMPLETE CBC W/AUTO DIFF WBC: CPT

## 2018-03-28 PROCEDURE — 99214 OFFICE O/P EST MOD 30 MIN: CPT | Mod: S$PBB,,, | Performed by: FAMILY MEDICINE

## 2018-03-28 PROCEDURE — 36415 COLL VENOUS BLD VENIPUNCTURE: CPT | Mod: PO

## 2018-03-28 PROCEDURE — 99214 OFFICE O/P EST MOD 30 MIN: CPT | Mod: PBBFAC,PO | Performed by: FAMILY MEDICINE

## 2018-03-28 PROCEDURE — 82150 ASSAY OF AMYLASE: CPT

## 2018-03-28 PROCEDURE — 80076 HEPATIC FUNCTION PANEL: CPT

## 2018-03-28 PROCEDURE — 99999 PR PBB SHADOW E&M-EST. PATIENT-LVL IV: CPT | Mod: PBBFAC,,, | Performed by: FAMILY MEDICINE

## 2018-03-28 PROCEDURE — 83690 ASSAY OF LIPASE: CPT

## 2018-03-28 RX ORDER — CHOLESTYRAMINE 4 G/9G
POWDER, FOR SUSPENSION ORAL
Qty: 378 G | Refills: 5 | Status: SHIPPED | OUTPATIENT
Start: 2018-03-28 | End: 2019-05-13

## 2018-03-28 NOTE — PROGRESS NOTES
Subjective:       Patient ID: Ad Lyons is a 63 y.o. male.    Chief Complaint: Abdominal Pain (upper)    63-year-old male status post cholecystectomy several years ago comes in for a recent pain in the right upper quadrant that started spontaneously not associated with a meal or activity.  The pain was somewhat stabbing and cramping and remained in the right upper quadrant not radiating to the back or scapular area.  He had no nausea or vomiting and no bloating.  He has chronic diarrhea since his cholecystectomy and did not note any change.  He has no history of blood in the bowel movements or melanotic stool but he does have a known history of Mills's esophagus.  His last upper endoscopy was August 2017 and his last colonoscopy was December 2017.  He states he's had several episodes of pain in this area that he usually associates with bending forward and it feels as if something is being pinched.  That was not the case in this instance and he reports it did feel somewhat similar to his gallbladder disease before surgery.  He is concerned that he may have had a retained hepatic stone and states this has happened at least once since his gallbladder was removed.  He underwent an ERCP in December 2016 with Dr. Tyler during which a retained stone and sludge was found and a balloon was used to sweep the bile duct and clear the obstruction.    Past Medical History:  No date: Mills's esophagus  No date: DDD (degenerative disc disease), cervical  No date: Ear ringing  No date: Gallbladder disease  No date: Gastric ulcer with perforation  No date: GERD (gastroesophageal reflux disease)  No date: Kidney stone  2017: Meniere disease      Comment: Dr TAYLA Mohan  No date: Vertigo    Past Surgical History:  No date: ADENOIDECTOMY  No date: CHOLECYSTECTOMY  12/27/2017: COLONOSCOPY N/A      Comment: Procedure: COLONOSCOPY;  Surgeon: Adrian Castillo MD;  Location: [facility];  Service:                 Endoscopy;  Laterality: N/A;  1/2013: COLONOSCOPY W/ POLYPECTOMY      Comment: Dr Hughes  12/26/2016: ERCP      Comment: [facility] Epic note  1/2013: ESOPHAGOGASTRODUODENOSCOPY      Comment: Dr Hughes  No date: TONSILLECTOMY  No date: VASECTOMY      Current Outpatient Prescriptions:     chlorhexidine (PERIDEX) 0.12 % solution, RINSE WITH 1/2 OZ. ONCE DAILY FOR 5 DAYS (Patient taking differently: RINSE WITH 1/2 OZ. ONCE DAILY FOR 5 DAYS prn), Disp: 473 mL, Rfl: 0    cyanocobalamin, vitamin B-12, (VITAMIN B-12) 1,000 mcg Subl, Place 1 tablet under the tongue once daily. , Disp: , Rfl:     fluticasone (FLONASE) 50 mcg/actuation nasal spray, INHALE 1 SPRAY INTO EACH NOSTRIL ONCE DAILY (Patient taking differently: INHALE 1 SPRAY INTO EACH NOSTRIL ONCE DAILY prn), Disp: 16 g, Rfl: 3    LOPERAMIDE HCL (IMODIUM A-D ORAL), Take 2 tablets by mouth daily as needed., Disp: , Rfl:     omeprazole (PRILOSEC) 40 MG capsule, TAKE 1 CAPSULE (40 MG TOTAL) BY MOUTH ONCE DAILY., Disp: 90 capsule, Rfl: 2    sucralfate (CARAFATE) 1 gram tablet, Take 1 tablet (1 g total) by mouth 4 (four) times daily before meals and nightly. Dissolve pill in 2 tablespoons water and drink (Patient taking differently: Take 1 g by mouth 4 (four) times daily as needed. Dissolve pill in 2 tablespoons water and drink), Disp: 120 tablet, Rfl: 5    TETANUS VACCINE due on 01/15/1972        Review of Systems   Constitutional: Negative for chills, diaphoresis and fever.   Respiratory: Negative for chest tightness and shortness of breath.    Cardiovascular: Negative for chest pain (he did note discomfort in the right lower chest while taking a deep breath during the episode of abdominal pain) and palpitations.   Gastrointestinal: Positive for abdominal pain and diarrhea. Negative for anal bleeding, blood in stool, constipation, nausea and vomiting.   Skin: Negative for color change and rash.       Objective:      Physical Exam   Constitutional: He appears  well-developed. No distress.   Good blood pressure control  Overweight with BMI 29.1 he is down 1.8 pounds from his last visit with me September 20, 2017   Abdominal: Soft. Bowel sounds are normal. He exhibits no distension and no mass. There is no tenderness. There is no rigidity, no rebound, no guarding, no CVA tenderness, no tenderness at McBurney's point and negative Huertas's sign. No hernia.   Skin: Skin is warm and dry. No rash noted. He is not diaphoretic. No erythema.   Nursing note and vitals reviewed.      Assessment:       1. RUQ pain    2. Bile salt-induced diarrhea    3. Mills's esophagus without dysplasia    4. BMI 29.0-29.9,adult        Plan:       1. RUQ pain  Possibility of a second retained gallstone, symptoms are improved so it may have passed  - Hepatic function panel; Future  - Amylase; Future  - CBC auto differential; Future  - Lipase; Future  - US Abdomen Limited; Future    2. Bile salt-induced diarrhea  Discussed use of cholestyramine resin.  Stressed not to use too much which might result in constipation  - cholestyramine, with sugar, 4 gram Powd; 1-3 scoops per day for diarrhea after gallbladder surgery  Dispense: 378 g; Refill: 5    3. Mills's esophagus without dysplasia  Followed by GI    4. BMI 29.0-29.9,adult

## 2018-03-29 ENCOUNTER — PATIENT MESSAGE (OUTPATIENT)
Dept: FAMILY MEDICINE | Facility: CLINIC | Age: 63
End: 2018-03-29

## 2018-04-05 ENCOUNTER — HOSPITAL ENCOUNTER (OUTPATIENT)
Dept: RADIOLOGY | Facility: CLINIC | Age: 63
Discharge: HOME | End: 2018-04-05
Attending: FAMILY MEDICINE
Payer: MEDICAID

## 2018-04-05 DIAGNOSIS — R10.11 RUQ PAIN: ICD-10-CM

## 2018-04-05 PROCEDURE — 76705 ECHO EXAM OF ABDOMEN: CPT | Mod: TC,PO

## 2018-04-05 PROCEDURE — 76705 ECHO EXAM OF ABDOMEN: CPT | Mod: 26,,, | Performed by: RADIOLOGY

## 2018-04-19 RX ORDER — FLUTICASONE PROPIONATE 50 MCG
SPRAY, SUSPENSION (ML) NASAL
Qty: 16 G | Refills: 3 | Status: SHIPPED | OUTPATIENT
Start: 2018-04-19 | End: 2018-06-10 | Stop reason: SDUPTHER

## 2018-05-12 DIAGNOSIS — R11.2 INTRACTABLE VOMITING WITH NAUSEA, UNSPECIFIED VOMITING TYPE: ICD-10-CM

## 2018-05-14 RX ORDER — ONDANSETRON 8 MG/1
8 TABLET, ORALLY DISINTEGRATING ORAL EVERY 8 HOURS
Qty: 6 TABLET | Refills: 3 | Status: SHIPPED | OUTPATIENT
Start: 2018-05-14 | End: 2019-01-04 | Stop reason: SDUPTHER

## 2018-06-06 ENCOUNTER — CLINICAL SUPPORT (OUTPATIENT)
Dept: AUDIOLOGY | Facility: CLINIC | Age: 63
End: 2018-06-06
Payer: MEDICAID

## 2018-06-06 ENCOUNTER — OFFICE VISIT (OUTPATIENT)
Dept: OTOLARYNGOLOGY | Facility: CLINIC | Age: 63
End: 2018-06-06
Payer: MEDICAID

## 2018-06-06 VITALS
DIASTOLIC BLOOD PRESSURE: 83 MMHG | SYSTOLIC BLOOD PRESSURE: 139 MMHG | HEIGHT: 74 IN | HEART RATE: 70 BPM | BODY MASS INDEX: 29.31 KG/M2 | WEIGHT: 228.38 LBS

## 2018-06-06 DIAGNOSIS — H91.90 HEARING DIFFICULTY, UNSPECIFIED LATERALITY: Primary | ICD-10-CM

## 2018-06-06 DIAGNOSIS — H83.3X1: Primary | ICD-10-CM

## 2018-06-06 DIAGNOSIS — H93.13 TINNITUS OF BOTH EARS: ICD-10-CM

## 2018-06-06 DIAGNOSIS — H93.292 IMPAIRED AUDITORY DISCRIMINATION, LEFT: ICD-10-CM

## 2018-06-06 DIAGNOSIS — H81.02 ACTIVE COCHLEOVESTIBULAR MENIERE'S DISEASE OF LEFT EAR: ICD-10-CM

## 2018-06-06 DIAGNOSIS — H90.3 ASYMMETRIC SNHL (SENSORINEURAL HEARING LOSS): Primary | ICD-10-CM

## 2018-06-06 DIAGNOSIS — H90.A32 MIXED CONDUCTIVE AND SENSORINEURAL HEARING LOSS OF LEFT EAR WITH RESTRICTED HEARING OF RIGHT EAR: ICD-10-CM

## 2018-06-06 DIAGNOSIS — H90.5 HIGH FREQUENCY SENSORINEURAL HEARING LOSS OF RIGHT EAR: ICD-10-CM

## 2018-06-06 PROCEDURE — 99999 PR PBB SHADOW E&M-EST. PATIENT-LVL IV: CPT | Mod: PBBFAC,,, | Performed by: NURSE PRACTITIONER

## 2018-06-06 PROCEDURE — 99999 PR PBB SHADOW E&M-EST. PATIENT-LVL I: CPT | Mod: PBBFAC,,,

## 2018-06-06 PROCEDURE — 92567 TYMPANOMETRY: CPT | Mod: PBBFAC,PO | Performed by: AUDIOLOGIST

## 2018-06-06 PROCEDURE — 99214 OFFICE O/P EST MOD 30 MIN: CPT | Mod: S$PBB,,, | Performed by: NURSE PRACTITIONER

## 2018-06-06 PROCEDURE — 99211 OFF/OP EST MAY X REQ PHY/QHP: CPT | Mod: PBBFAC,27,PO,25

## 2018-06-06 PROCEDURE — 92557 COMPREHENSIVE HEARING TEST: CPT | Mod: PBBFAC,PO | Performed by: AUDIOLOGIST

## 2018-06-06 PROCEDURE — 99214 OFFICE O/P EST MOD 30 MIN: CPT | Mod: PBBFAC,PO,25 | Performed by: NURSE PRACTITIONER

## 2018-06-06 RX ORDER — PREDNISONE 20 MG/1
TABLET ORAL
Qty: 24 TABLET | Refills: 0 | Status: SHIPPED | OUTPATIENT
Start: 2018-06-06 | End: 2018-06-18

## 2018-06-06 RX ORDER — TRIAMTERENE AND HYDROCHLOROTHIAZIDE 37.5; 25 MG/1; MG/1
CAPSULE ORAL
Qty: 30 CAPSULE | Refills: 2 | Status: SHIPPED | OUTPATIENT
Start: 2018-06-06 | End: 2019-01-28 | Stop reason: SDUPTHER

## 2018-06-09 DIAGNOSIS — K21.9 GASTROESOPHAGEAL REFLUX DISEASE, ESOPHAGITIS PRESENCE NOT SPECIFIED: ICD-10-CM

## 2018-06-10 RX ORDER — FLUTICASONE PROPIONATE 50 MCG
SPRAY, SUSPENSION (ML) NASAL
Qty: 15.8 ML | Refills: 3 | Status: SHIPPED | OUTPATIENT
Start: 2018-06-10 | End: 2019-01-07 | Stop reason: SDUPTHER

## 2018-06-11 RX ORDER — OMEPRAZOLE 40 MG/1
40 CAPSULE, DELAYED RELEASE ORAL DAILY
Qty: 90 CAPSULE | Refills: 2 | Status: SHIPPED | OUTPATIENT
Start: 2018-06-11 | End: 2019-04-10 | Stop reason: SDUPTHER

## 2018-06-12 RX ORDER — CHLORHEXIDINE GLUCONATE ORAL RINSE 1.2 MG/ML
SOLUTION DENTAL
Qty: 473 ML | Refills: 0 | Status: SHIPPED | OUTPATIENT
Start: 2018-06-12 | End: 2019-05-13

## 2018-06-15 ENCOUNTER — TELEPHONE (OUTPATIENT)
Dept: FAMILY MEDICINE | Facility: CLINIC | Age: 63
End: 2018-06-15

## 2018-06-15 NOTE — TELEPHONE ENCOUNTER
----- Message from Hakeem Saini sent at 6/15/2018  9:44 AM CDT -----  Contact: pt  Pt is calling to see if he can get some orders for blood work for a full body mass and his cholesterol, needs to have this done for his new insurance company   Call Back#[phone]  Thanks

## 2018-09-13 DIAGNOSIS — H93.19 TINNITUS, UNSPECIFIED LATERALITY: Primary | ICD-10-CM

## 2019-01-02 ENCOUNTER — TELEPHONE (OUTPATIENT)
Dept: FAMILY MEDICINE | Facility: CLINIC | Age: 64
End: 2019-01-02

## 2019-01-02 DIAGNOSIS — R42 VERTIGO: ICD-10-CM

## 2019-01-02 RX ORDER — MECLIZINE HYDROCHLORIDE 25 MG/1
25 TABLET ORAL 3 TIMES DAILY PRN
Qty: 60 TABLET | Refills: 0 | Status: SHIPPED | OUTPATIENT
Start: 2019-01-02 | End: 2020-04-07 | Stop reason: SDUPTHER

## 2019-01-02 NOTE — TELEPHONE ENCOUNTER
----- Message from Nikhil Isidro sent at 1/2/2019  2:34 PM CST -----  Contact: pt  Type:  RX Refill Request    Who Called:  pt  Refill or New Rx:  refill  RX Name and Strength:  meclinine  How is the patient currently taking it? (ex. 1XDay):  4 x day  Is this a 30 day or 90 day RX:  90  Preferred Pharmacy with phone number:   [facility] #[id number] - [place], [place] - [address]  Phone: [phone]    Local or Mail Order:    Ordering Provider:    Best Call Back Number:  [phone]  Additional Information:

## 2019-01-04 ENCOUNTER — TELEPHONE (OUTPATIENT)
Dept: FAMILY MEDICINE | Facility: CLINIC | Age: 64
End: 2019-01-04

## 2019-01-04 DIAGNOSIS — R11.2 INTRACTABLE VOMITING WITH NAUSEA, UNSPECIFIED VOMITING TYPE: ICD-10-CM

## 2019-01-04 RX ORDER — ONDANSETRON 8 MG/1
8 TABLET, ORALLY DISINTEGRATING ORAL EVERY 8 HOURS
Qty: 6 TABLET | Refills: 3 | Status: SHIPPED | OUTPATIENT
Start: 2019-01-04 | End: 2020-04-07 | Stop reason: SDUPTHER

## 2019-01-04 NOTE — TELEPHONE ENCOUNTER
----- Message from Carrie Galo sent at 1/4/2019  4:48 PM CST -----  Contact: Patient  Pt is requesting a refill on Ondansetron 8mg and Fluticasone 50mcg to [facility] on [address] [facility]. Please give him a call back at [phone] when this is complete.    Thank you,  Carrie MADRID

## 2019-01-04 NOTE — TELEPHONE ENCOUNTER
Spoke to patient and pharmacist- no auth needed per [employer]-gave pharmacist his new id # [id number].

## 2019-01-04 NOTE — TELEPHONE ENCOUNTER
----- Message from Nikhil Isidro sent at 1/4/2019 11:17 AM CST -----  Contact: pt  Type:  Pharmacy Calling to Clarify an RX    Name of Caller:  Pt  Pharmacy Name:    [id number] - [place] - [address]  [address] [address]  Phone: [phone] Fax: [phone]    Prescription Name:  meclizine (ANTIVERT) 25 mg tablet  What do they need to clarify?:  Rx needs PA  Best Call Back Number:  [phone]   Additional Information:  Pt states the pharmacy has reached out Wednesday to get the PA but no response yet

## 2019-01-07 ENCOUNTER — OFFICE VISIT (OUTPATIENT)
Dept: FAMILY MEDICINE | Facility: CLINIC | Age: 64
End: 2019-01-07
Payer: MEDICARE

## 2019-01-07 ENCOUNTER — TELEPHONE (OUTPATIENT)
Dept: FAMILY MEDICINE | Facility: CLINIC | Age: 64
End: 2019-01-07

## 2019-01-07 VITALS
HEIGHT: 74 IN | HEART RATE: 72 BPM | DIASTOLIC BLOOD PRESSURE: 85 MMHG | TEMPERATURE: 98 F | SYSTOLIC BLOOD PRESSURE: 138 MMHG | BODY MASS INDEX: 27.72 KG/M2 | WEIGHT: 216 LBS

## 2019-01-07 DIAGNOSIS — R11.2 INTRACTABLE VOMITING WITH NAUSEA, UNSPECIFIED VOMITING TYPE: ICD-10-CM

## 2019-01-07 DIAGNOSIS — R19.7 DIARRHEA, UNSPECIFIED TYPE: ICD-10-CM

## 2019-01-07 DIAGNOSIS — K21.9 GASTROESOPHAGEAL REFLUX DISEASE WITHOUT ESOPHAGITIS: ICD-10-CM

## 2019-01-07 DIAGNOSIS — R35.1 BPH ASSOCIATED WITH NOCTURIA: ICD-10-CM

## 2019-01-07 DIAGNOSIS — N40.1 BPH ASSOCIATED WITH NOCTURIA: ICD-10-CM

## 2019-01-07 DIAGNOSIS — H93.13 TINNITUS OF BOTH EARS: ICD-10-CM

## 2019-01-07 DIAGNOSIS — K22.70 BARRETT'S ESOPHAGUS WITHOUT DYSPLASIA: ICD-10-CM

## 2019-01-07 DIAGNOSIS — Z79.899 HIGH RISK MEDICATION USE: ICD-10-CM

## 2019-01-07 DIAGNOSIS — J30.9 ALLERGIC SINUSITIS: ICD-10-CM

## 2019-01-07 DIAGNOSIS — H81.02 ACTIVE COCHLEOVESTIBULAR MENIERE'S DISEASE OF LEFT EAR: Primary | ICD-10-CM

## 2019-01-07 DIAGNOSIS — Z23 NEED FOR IMMUNIZATION AGAINST INFLUENZA: ICD-10-CM

## 2019-01-07 DIAGNOSIS — E78.2 MIXED HYPERLIPIDEMIA: ICD-10-CM

## 2019-01-07 DIAGNOSIS — H90.3 SENSORINEURAL HEARING LOSS (SNHL) OF BOTH EARS: ICD-10-CM

## 2019-01-07 DIAGNOSIS — L98.9 SKIN LESION OF FACE: ICD-10-CM

## 2019-01-07 PROCEDURE — G0008 FLU VACCINE (QUAD) GREATER THAN OR EQUAL TO 3YO PRESERVATIVE FREE IM: ICD-10-PCS | Mod: S$GLB,,, | Performed by: NURSE PRACTITIONER

## 2019-01-07 PROCEDURE — G0008 ADMIN INFLUENZA VIRUS VAC: HCPCS | Mod: S$GLB,,, | Performed by: NURSE PRACTITIONER

## 2019-01-07 PROCEDURE — 99214 OFFICE O/P EST MOD 30 MIN: CPT | Mod: 25,S$GLB,, | Performed by: NURSE PRACTITIONER

## 2019-01-07 PROCEDURE — 99999 PR PBB SHADOW E&M-EST. PATIENT-LVL V: CPT | Mod: PBBFAC,,, | Performed by: NURSE PRACTITIONER

## 2019-01-07 PROCEDURE — 3008F PR BODY MASS INDEX (BMI) DOCUMENTED: ICD-10-PCS | Mod: S$GLB,,, | Performed by: NURSE PRACTITIONER

## 2019-01-07 PROCEDURE — 99214 PR OFFICE/OUTPT VISIT, EST, LEVL IV, 30-39 MIN: ICD-10-PCS | Mod: 25,S$GLB,, | Performed by: NURSE PRACTITIONER

## 2019-01-07 PROCEDURE — 90686 FLU VACCINE (QUAD) GREATER THAN OR EQUAL TO 3YO PRESERVATIVE FREE IM: ICD-10-PCS | Mod: S$GLB,,, | Performed by: NURSE PRACTITIONER

## 2019-01-07 PROCEDURE — 99999 PR PBB SHADOW E&M-EST. PATIENT-LVL V: ICD-10-PCS | Mod: PBBFAC,,, | Performed by: NURSE PRACTITIONER

## 2019-01-07 PROCEDURE — 90686 IIV4 VACC NO PRSV 0.5 ML IM: CPT | Mod: S$GLB,,, | Performed by: NURSE PRACTITIONER

## 2019-01-07 PROCEDURE — 3008F BODY MASS INDEX DOCD: CPT | Mod: S$GLB,,, | Performed by: NURSE PRACTITIONER

## 2019-01-07 RX ORDER — ONDANSETRON 8 MG/1
8 TABLET, ORALLY DISINTEGRATING ORAL EVERY 8 HOURS
Qty: 6 TABLET | Refills: 3 | Status: CANCELLED | OUTPATIENT
Start: 2019-01-07

## 2019-01-07 RX ORDER — AZELASTINE 1 MG/ML
1 SPRAY, METERED NASAL 2 TIMES DAILY
Qty: 30 ML | Refills: 5 | Status: SHIPPED | OUTPATIENT
Start: 2019-01-07 | End: 2019-05-03 | Stop reason: SDUPTHER

## 2019-01-07 RX ORDER — FLUTICASONE PROPIONATE 50 MCG
2 SPRAY, SUSPENSION (ML) NASAL DAILY
Qty: 16 G | Refills: 5 | Status: SHIPPED | OUTPATIENT
Start: 2019-01-07 | End: 2019-05-03 | Stop reason: SDUPTHER

## 2019-01-07 NOTE — PATIENT INSTRUCTIONS

## 2019-01-07 NOTE — PROGRESS NOTES
Subjective:       Patient ID: Ad Lyons is a 63 y.o. male.    Chief Complaint: Follow-up    Chief Complaint  Chief Complaint   Patient presents with    Follow-up       HPI  Ad Lyons is a 63 y.o. male with medical diagnoses as listed in the medical history and problem list that presents for regular scheduled follow up. Patient is regularly treated for tinnitus, Meniere's disease, vertigo with nausea, GERD with Mills's esophagus, and sinus allergies. Blood pressure today is 138/85. BMI today is 27.73 and patient has lost 10 pounds since visit 3/28/18.  States has not really been dieting or increased activity level but was ill with an upper respiratory infection at the end of December and appetite was not normal.  Presents today with concerns regarding inability to refill current Zofran prescription as he was told by the pharmacy that the medication was no longer covered by his insurance.  While patient was in office his pharmacy benefit manager as well as his pharmacy were called about the prescription and it was determined that prior authorization is required for the Zofran after the start of the new year.  Prior authorization will be completed by office and patient will be informed if medication is authorized.        PAST MEDICAL HISTORY:  Past Medical History:   Diagnosis Date    Mills's esophagus     DDD (degenerative disc disease), cervical     Ear ringing     Gallbladder disease     Gastric ulcer with perforation     GERD (gastroesophageal reflux disease)     Kidney stone     Meniere disease 2017    Dr TAYLA Mohan    Vertigo        PAST SURGICAL HISTORY:  Past Surgical History:   Procedure Laterality Date    ADENOIDECTOMY      CHOLECYSTECTOMY      COLONOSCOPY N/A 12/27/2017    [facility]    COLONOSCOPY W/ POLYPECTOMY  1/2013    Dr Hughes    ERCP  12/26/2016    [facility] Epic note    ERCP N/A 12/26/2016    Performed by Dejuan Tyler MD at  [facility] OR    ESOPHAGOGASTRODUODENOSCOPY  1/2013    Dr Hughes    ESOPHAGOGASTRODUODENOSCOPY (EGD) N/A 8/31/2017    [facility]    ESOPHAGOGASTRODUODENOSCOPY (EGD) N/A 5/9/2016    [facility]    TONSILLECTOMY      VASECTOMY         SOCIAL HISTORY:  Social History     Socioeconomic History    Marital status:      Spouse name: Not on file    Number of children: Not on file    Years of education: Not on file    Highest education level: Not on file   Social Needs    Financial resource strain: Not on file    Food insecurity - worry: Not on file    Food insecurity - inability: Not on file    Transportation needs - medical: Not on file    Transportation needs - non-medical: Not on file   Occupational History     Employer: [employer]   Tobacco Use    Smoking status: Never Smoker    Smokeless tobacco: Never Used   Substance and Sexual Activity    Alcohol use: No    Drug use: No    Sexual activity: Not on file   Other Topics Concern    Not on file   Social History Narrative    Not on file       FAMILY HISTORY:  Family History   Problem Relation Age of Onset    Heart disease Father     No Known Problems Mother     No Known Problems Sister     Cancer Brother         possible colon cancer    No Known Problems Daughter     No Known Problems Son     Cancer Sister         breast cancer    No Known Problems Daughter     No Known Problems Son        ALLERGIES AND MEDICATIONS: updated and reviewed.  Review of patient's allergies indicates:   Allergen Reactions    Aspirin Other (See Comments)     Stomach ulcers     Current Outpatient Medications   Medication Sig Dispense Refill    chlorhexidine (PERIDEX) 0.12 % solution RINSE WITH 1/2 OZ. ONCE DAILY FOR 5 DAYS 473 mL 0    cholestyramine, with sugar, 4 gram Powd 1-3 scoops per day for diarrhea after gallbladder surgery 378 g 5    cyanocobalamin, vitamin B-12,  (VITAMIN B-12) 1,000 mcg Subl Place 1 tablet under the tongue once daily.       fluticasone (FLONASE) 50 mcg/actuation nasal spray 2 sprays (100 mcg total) by Each Nare route Daily. 16 g 5    LOPERAMIDE HCL (IMODIUM A-D ORAL) Take 2 tablets by mouth daily as needed.      meclizine (ANTIVERT) 25 mg tablet Take 1 tablet (25 mg total) by mouth 3 (three) times daily as needed for Dizziness. 60 tablet 0    omeprazole (PRILOSEC) 40 MG capsule TAKE 1 CAPSULE (40 MG TOTAL) BY MOUTH ONCE DAILY. 90 capsule 2    ondansetron (ZOFRAN-ODT) 8 MG TbDL Take 1 tablet (8 mg total) by mouth every 8 (eight) hours. 6 tablet 3    sucralfate (CARAFATE) 1 gram tablet Take 1 tablet (1 g total) by mouth 4 (four) times daily before meals and nightly. Dissolve pill in 2 tablespoons water and drink (Patient taking differently: Take 1 g by mouth 4 (four) times daily as needed. Dissolve pill in 2 tablespoons water and drink) 120 tablet 5    triamterene-hydrochlorothiazide 37.5-25 mg (DYAZIDE) 37.5-25 mg per capsule 1/2 to 1 tablet every day during Meniere's episode 30 capsule 2    azelastine (ASTELIN) 137 mcg (0.1 %) nasal spray 1 spray (137 mcg total) by Nasal route 2 (two) times daily. 30 mL 5     No current facility-administered medications for this visit.        I have reviewed the patient's medical history in detail and updated the computerized patient record.    Review of Systems   Constitutional: Negative for activity change, appetite change, chills, fatigue and fever.        Patient states recent viral illness and was not eating as much as usual, 10 pound weight loss since last visit.    HENT: Positive for congestion and hearing loss. Negative for ear pain, postnasal drip, rhinorrhea, sinus pressure, sinus pain and sore throat.         Still having some sinus congestion and using flonase 4 sprays to each nostril daily.    Eyes: Negative for visual disturbance.        Vision corrected with glasses   Respiratory: Negative for cough  "and shortness of breath.    Cardiovascular: Positive for leg swelling. Negative for chest pain and palpitations.        Sometimes gets swelling to ankles and feet   Gastrointestinal: Positive for diarrhea and nausea. Negative for abdominal pain, constipation and vomiting.        Chronic diarrhea treated with Questran with effect. Nausea with Meniere's attack and vertigo relieved with zofran.   Genitourinary: Negative for difficulty urinating and dysuria.        Nocturia x 1 most nights   Musculoskeletal: Negative for arthralgias and myalgias.   Skin: Positive for rash.        Lesion to right temple first noted about one month ago, occasionally pruritic.    Neurological: Positive for dizziness. Negative for numbness and headaches.        Has an attack of Meniere's disease every 30-40 days, relieved with meclizine and dyazide   Hematological: Does not bruise/bleed easily.   Psychiatric/Behavioral: Negative for dysphoric mood and sleep disturbance. The patient is not nervous/anxious.          Objective:      Vitals:    01/07/19 1317   BP: 138/85   BP Location: Right arm   Patient Position: Sitting   BP Method: Large (Automatic)   Pulse: 72   Temp: 98.4 °F (36.9 °C)   TempSrc: Oral   Weight: 98 kg (216 lb)   Height: 6' 2" (1.88 m)     Physical Exam   Constitutional: He is oriented to person, place, and time. Vital signs are normal. He appears well-developed and well-nourished. No distress.   Blood pressure 138/85   HENT:   Head: Normocephalic and atraumatic.   Right Ear: Tympanic membrane, external ear and ear canal normal. Decreased hearing is noted.   Left Ear: Tympanic membrane, external ear and ear canal normal. Decreased hearing is noted.   Nose: Nose normal. No mucosal edema.   Mouth/Throat: Oropharynx is clear and moist and mucous membranes are normal. No oropharyngeal exudate.   Bilateral hearing aids   Eyes: Conjunctivae and lids are normal. Pupils are equal, round, and reactive to light. Right eye exhibits no " discharge. Left eye exhibits no discharge. Right conjunctiva is not injected. Left conjunctiva is not injected. Right eye exhibits nystagmus. Right eye exhibits normal extraocular motion. Left eye exhibits normal extraocular motion.   Neck: Normal range of motion. Neck supple. Normal carotid pulses present. Carotid bruit is not present. No thyromegaly present.   Cardiovascular: Normal rate, regular rhythm, S1 normal, S2 normal, normal heart sounds, intact distal pulses and normal pulses.   No murmur heard.  Pulses:       Carotid pulses are 2+ on the right side, and 2+ on the left side.       Radial pulses are 2+ on the right side, and 2+ on the left side.        Posterior tibial pulses are 2+ on the right side, and 2+ on the left side.   No edema   Pulmonary/Chest: Effort normal and breath sounds normal. No respiratory distress. He has no decreased breath sounds. He has no wheezes. He has no rhonchi. He has no rales.   Abdominal: Soft. Normal appearance, normal aorta and bowel sounds are normal. He exhibits no distension, no abdominal bruit, no pulsatile midline mass and no mass. There is no hepatosplenomegaly. There is tenderness in the right upper quadrant. No hernia.   Tenderness, mild, to RUQ abdomen with deep palpation   Musculoskeletal: Normal range of motion. He exhibits no edema, tenderness or deformity.   Lymphadenopathy:     He has no cervical adenopathy.        Right: No supraclavicular adenopathy present.        Left: No supraclavicular adenopathy present.   Neurological: He is alert and oriented to person, place, and time. He has normal strength.   Skin: Skin is warm, dry and intact. Lesion noted. No rash noted. He is not diaphoretic. No erythema. No pallor.        Psychiatric: He has a normal mood and affect. His speech is normal and behavior is normal. Judgment and thought content normal. His mood appears not anxious. Cognition and memory are normal. He does not exhibit a depressed mood.   Nursing  note and vitals reviewed.        Assessment:       1. Active cochleovestibular Meniere's disease of left ear    2. Tinnitus of both ears    3. Intractable vomiting with nausea, unspecified vomiting type    4. Sensorineural hearing loss (SNHL) of both ears    5. Gastroesophageal reflux disease without esophagitis    6. Mills's esophagus without dysplasia    7. Allergic sinusitis    8. Diarrhea, unspecified type    9. Mixed hyperlipidemia    10. BPH associated with nocturia    11. Skin lesion of face    12. BMI 27.0-27.9,adult    13. High risk medication use    14. Need for immunization against influenza          Plan:       Ad was seen today for follow-up.  Discussed healthy diet, regular exercise, necessary labs, age appropriate cancer screening, and routine vaccinations.    Educational handouts provided.  Prevention guidelines for men age 50-64  Diagnoses and all orders for this visit:    Active cochleovestibular Meniere's disease of left ear   Patient states one severe episode of Meniere's disease with vertigo, nausea and vomiting approximately every 30-40 days   Symptoms are relieved with use of meclizine, Dyazide, and Zofran as needed  Tinnitus of both ears   Stable, chronic condition.  Will continue to maximize risk factor reduction and adjust medication as needed.  Intractable vomiting with nausea, unspecified vomiting type   Occurs with episodes of Meniere's disease, relieved with use of Zofran  Sensorineural hearing loss (SNHL) of both ears   Patient wears bilateral hearing aids, able to communicate without difficulty  Gastroesophageal reflux disease without esophagitis  -     CBC auto differential; Future  - denies symptoms of heartburn and GERD with use of omeprazole 40 mg daily and Carafate before meals and at bedtime as needed    Mills's esophagus without dysplasia  -     CBC auto differential; Future  - currently asymptomatic with omeprazole and Carafate as prescribed  - Follow up with  gastroenterology Dr. Castillo asa needed    Allergic sinusitis  -     fluticasone (FLONASE) 50 mcg/actuation nasal spray; 2 sprays (100 mcg total) by Each Nare route Daily.  -     azelastine (ASTELIN) 137 mcg (0.1 %) nasal spray; 1 spray (137 mcg total) by Nasal route 2 (two) times daily, new medication.  - may continue nasal saline spray as needed  - instructed to use Flonase only two sprays to each nostril once daily, Astelin added to provide increased decongestant affect  Diarrhea, unspecified type  -     Comprehensive metabolic panel; Future  - chronic diarrhea following cholecystectomy treated with Questran powder with good effect    Mixed hyperlipidemia  -     Lipid panel; Future  -   Lab Results   Component Value Date    CHOL 182 02/17/2018    CHOL 174 03/18/2016    CHOL 204 (H) 04/29/2015     Lab Results   Component Value Date    HDL 40 02/17/2018    HDL 40 03/18/2016    HDL 44 04/29/2015     Lab Results   Component Value Date    LDLCALC 109.8 02/17/2018    LDLCALC 106.2 03/18/2016    LDLCALC 132.8 04/29/2015     Lab Results   Component Value Date    TRIG 161 (H) 02/17/2018    TRIG 139 03/18/2016    TRIG 136 04/29/2015     Lab Results   Component Value Date    CHOLHDL 22.0 02/17/2018    CHOLHDL 23.0 03/18/2016    CHOLHDL 21.6 04/29/2015     The 10-year ASCVD risk score (Felisa GILMAN Jr., et al., 2013) is: 14.2%    Values used to calculate the score:      Age: 63 years      Sex: Male      Is Non- : No      Diabetic: No      Tobacco smoker: No      Systolic Blood Pressure: 138 mmHg      Is BP treated: No      HDL Cholesterol: 40 mg/dL      Total Cholesterol: 182 mg/dL     - discussed recommendation for treatment with a statin medication for elevated ASCVD risk score  - patient would like to see if cholesterol levels have decreased with use of Questran powder  - will readdress the recommendation/need for statin medication when new lipid panel results are available    BPH associated with  nocturia  -     Prostate Specific Antigen, Diagnostic; Future  -   Lab Results   Component Value Date    PSA 1.7 03/18/2016    PSA 1.6 04/29/2015    PSA 1.05 04/19/2013       Skin lesion of face  -     Ambulatory referral to Dermatology  - skin lesion to right temple area, possible precancerous or cancerous lesion, recommend biopsy versus destruction with cryotherapy    BMI 27.0-27.9,adult   BMI 27.73 today and patient has lost 10 lb since last visit on 3/28/2018   Weight loss recommended with well balanced diet and portion controlled meals and increased activity.    Congratulated on recent weight loss  High risk medication use  -     Comprehensive metabolic panel; Future    Need for immunization against influenza  -     Influenza - Quadrivalent (3 years & older) (PF)    Follow-up in about 1 year (around 1/7/2020) for annual/check up and as needed.

## 2019-01-08 ENCOUNTER — LAB VISIT (OUTPATIENT)
Dept: LAB | Facility: HOSPITAL | Age: 64
End: 2019-01-08
Payer: MEDICARE

## 2019-01-08 DIAGNOSIS — Z79.899 HIGH RISK MEDICATION USE: ICD-10-CM

## 2019-01-08 DIAGNOSIS — R35.1 BPH ASSOCIATED WITH NOCTURIA: ICD-10-CM

## 2019-01-08 DIAGNOSIS — K21.9 GASTROESOPHAGEAL REFLUX DISEASE WITHOUT ESOPHAGITIS: ICD-10-CM

## 2019-01-08 DIAGNOSIS — K22.70 BARRETT'S ESOPHAGUS WITHOUT DYSPLASIA: ICD-10-CM

## 2019-01-08 DIAGNOSIS — N40.1 BPH ASSOCIATED WITH NOCTURIA: ICD-10-CM

## 2019-01-08 DIAGNOSIS — E78.2 MIXED HYPERLIPIDEMIA: ICD-10-CM

## 2019-01-08 DIAGNOSIS — R19.7 DIARRHEA, UNSPECIFIED TYPE: ICD-10-CM

## 2019-01-08 LAB
ALBUMIN SERPL BCP-MCNC: 4.1 G/DL
ALP SERPL-CCNC: 71 U/L
ALT SERPL W/O P-5'-P-CCNC: 13 U/L
ANION GAP SERPL CALC-SCNC: 7 MMOL/L
AST SERPL-CCNC: 14 U/L
BASOPHILS # BLD AUTO: 0.03 K/UL
BASOPHILS NFR BLD: 0.5 %
BILIRUB SERPL-MCNC: 1.2 MG/DL
BUN SERPL-MCNC: 13 MG/DL
CALCIUM SERPL-MCNC: 9.5 MG/DL
CHLORIDE SERPL-SCNC: 104 MMOL/L
CHOLEST SERPL-MCNC: 163 MG/DL
CHOLEST/HDLC SERPL: 3.7 {RATIO}
CO2 SERPL-SCNC: 28 MMOL/L
COMPLEXED PSA SERPL-MCNC: 2 NG/ML
CREAT SERPL-MCNC: 1 MG/DL
DIFFERENTIAL METHOD: NORMAL
EOSINOPHIL # BLD AUTO: 0.3 K/UL
EOSINOPHIL NFR BLD: 4.4 %
ERYTHROCYTE [DISTWIDTH] IN BLOOD BY AUTOMATED COUNT: 13.3 %
EST. GFR  (AFRICAN AMERICAN): >60 ML/MIN/1.73 M^2
EST. GFR  (NON AFRICAN AMERICAN): >60 ML/MIN/1.73 M^2
GLUCOSE SERPL-MCNC: 91 MG/DL
HCT VFR BLD AUTO: 45.4 %
HDLC SERPL-MCNC: 44 MG/DL
HDLC SERPL: 27 %
HGB BLD-MCNC: 15.2 G/DL
IMM GRANULOCYTES # BLD AUTO: 0.02 K/UL
IMM GRANULOCYTES NFR BLD AUTO: 0.3 %
LDLC SERPL CALC-MCNC: 95.8 MG/DL
LYMPHOCYTES # BLD AUTO: 2.1 K/UL
LYMPHOCYTES NFR BLD: 34 %
MCH RBC QN AUTO: 29.8 PG
MCHC RBC AUTO-ENTMCNC: 33.5 G/DL
MCV RBC AUTO: 89 FL
MONOCYTES # BLD AUTO: 0.4 K/UL
MONOCYTES NFR BLD: 6 %
NEUTROPHILS # BLD AUTO: 3.4 K/UL
NEUTROPHILS NFR BLD: 54.8 %
NONHDLC SERPL-MCNC: 119 MG/DL
NRBC BLD-RTO: 0 /100 WBC
PLATELET # BLD AUTO: 247 K/UL
PMV BLD AUTO: 10.1 FL
POTASSIUM SERPL-SCNC: 4.6 MMOL/L
PROT SERPL-MCNC: 7.3 G/DL
RBC # BLD AUTO: 5.1 M/UL
SODIUM SERPL-SCNC: 139 MMOL/L
TRIGL SERPL-MCNC: 116 MG/DL
WBC # BLD AUTO: 6.12 K/UL

## 2019-01-08 PROCEDURE — 80053 COMPREHEN METABOLIC PANEL: CPT

## 2019-01-08 PROCEDURE — 80061 LIPID PANEL: CPT

## 2019-01-08 PROCEDURE — 84153 ASSAY OF PSA TOTAL: CPT

## 2019-01-08 PROCEDURE — 36415 COLL VENOUS BLD VENIPUNCTURE: CPT | Mod: PO

## 2019-01-08 PROCEDURE — 85025 COMPLETE CBC W/AUTO DIFF WBC: CPT

## 2019-01-11 ENCOUNTER — TELEPHONE (OUTPATIENT)
Dept: FAMILY MEDICINE | Facility: CLINIC | Age: 64
End: 2019-01-11

## 2019-01-11 NOTE — TELEPHONE ENCOUNTER
----- Message from Kristina Kaur sent at 1/11/2019 10:57 AM CST -----  Contact: pt [phone]  Patient called back he had a missed call from the nurse about his lab results and they left a message about a new medication she would like him to start taking he is not sure about the medication.the patient would like to know more about the medication. Also he would like the lab levels of the Alexanderi Mick  he states it was high last year.

## 2019-01-11 NOTE — TELEPHONE ENCOUNTER
Lab results discussed with patient. He wants to know what statin is recommended so he can look at side effects. He states he already takes Questran. Worried about med effecting his liver.

## 2019-01-11 NOTE — TELEPHONE ENCOUNTER
----- Message from Christy Boyce NP sent at 1/10/2019  1:00 PM CST -----  ## is he willing to start a statin medication at this time?##    Dear Mr. Lyons,   I have reviewed the results of your recent blood work.  The prostate specific antigen is 2.0.  This does indicate an enlarged prostate but is not suspicious for prostate cancer.  The blood sugar is normal at 91.  All other electrolytes are normal.  The liver enzymes are normal but the bilirubin is slightly elevated at 1.2.  This slight elevation is not concerning at this time.  The blood kidney function tests are normal.  Your lipid panel looks very good with all cholesterol levels in the triglyceride level in goal range.  However, even with all of the numbers in goal range, your atherosclerotic cardiovascular disease risk score is elevated at 12.4%.  This indicates that you have a 12.4% chance of having a stroke or heart attack in the next 10 years.  Whenever the risk score is 7.5% or greater, statin medication is recommended to decrease cardiovascular risk and lower cholesterol.  Are you willing to start a statin medication at this time?  The 10-year ASCVD risk score (Felisastephan GILMAN Jr., et al., 2013) is: 12.4%    Values used to calculate the score:      Age: 63 years      Sex: Male      Is Non- : No      Diabetic: No      Tobacco smoker: No      Systolic Blood Pressure: 138 mmHg      Is BP treated: No      HDL Cholesterol: 44 mg/dL      Total Cholesterol: 163 mg/dL  The complete blood count is normal.  There is no anemia.  The white blood cell and platelet counts are normal.  If you have any questions, please call or email the office.  Thank you.  Christy

## 2019-01-12 NOTE — TELEPHONE ENCOUNTER
I would recommend crestor 10-20 mg at night. All statins are metabolized by the liver but the risk of liver toxicity is very low.   Thanks.   Christy

## 2019-01-23 ENCOUNTER — INITIAL CONSULT (OUTPATIENT)
Dept: DERMATOLOGY | Facility: CLINIC | Age: 64
End: 2019-01-23
Payer: MEDICARE

## 2019-01-23 DIAGNOSIS — L57.0 ACTINIC KERATOSES: ICD-10-CM

## 2019-01-23 DIAGNOSIS — Z12.83 SKIN CANCER SCREENING: ICD-10-CM

## 2019-01-23 DIAGNOSIS — D48.5 NEOPLASM OF UNCERTAIN BEHAVIOR OF SKIN: Primary | ICD-10-CM

## 2019-01-23 DIAGNOSIS — D18.00 ANGIOMA: ICD-10-CM

## 2019-01-23 PROCEDURE — 88305 TISSUE SPECIMEN TO PATHOLOGY, DERMATOLOGY: ICD-10-PCS | Mod: 26,,, | Performed by: PATHOLOGY

## 2019-01-23 PROCEDURE — 17003 DESTRUCT PREMALG LES 2-14: CPT | Mod: S$GLB,,, | Performed by: DERMATOLOGY

## 2019-01-23 PROCEDURE — 17000 DESTRUCT PREMALG LESION: CPT | Mod: 59,S$GLB,, | Performed by: DERMATOLOGY

## 2019-01-23 PROCEDURE — 11102 TANGNTL BX SKIN SINGLE LES: CPT | Mod: S$GLB,,, | Performed by: DERMATOLOGY

## 2019-01-23 PROCEDURE — 88305 TISSUE EXAM BY PATHOLOGIST: CPT | Mod: 26,,, | Performed by: PATHOLOGY

## 2019-01-23 PROCEDURE — 99202 PR OFFICE/OUTPT VISIT, NEW, LEVL II, 15-29 MIN: ICD-10-PCS | Mod: 25,S$GLB,, | Performed by: DERMATOLOGY

## 2019-01-23 PROCEDURE — 17003 DESTRUCTION, PREMALIGNANT LESIONS; SECOND THROUGH 14 LESIONS: ICD-10-PCS | Mod: S$GLB,,, | Performed by: DERMATOLOGY

## 2019-01-23 PROCEDURE — 1101F PR PT FALLS ASSESS DOC 0-1 FALLS W/OUT INJ PAST YR: ICD-10-PCS | Mod: S$GLB,,, | Performed by: DERMATOLOGY

## 2019-01-23 PROCEDURE — 99202 OFFICE O/P NEW SF 15 MIN: CPT | Mod: 25,S$GLB,, | Performed by: DERMATOLOGY

## 2019-01-23 PROCEDURE — 99999 PR PBB SHADOW E&M-EST. PATIENT-LVL III: CPT | Mod: PBBFAC,,, | Performed by: DERMATOLOGY

## 2019-01-23 PROCEDURE — 1101F PT FALLS ASSESS-DOCD LE1/YR: CPT | Mod: S$GLB,,, | Performed by: DERMATOLOGY

## 2019-01-23 PROCEDURE — 17000 PR DESTRUCTION(LASER SURGERY,CRYOSURGERY,CHEMOSURGERY),PREMALIGNANT LESIONS,FIRST LESION: ICD-10-PCS | Mod: 59,S$GLB,, | Performed by: DERMATOLOGY

## 2019-01-23 PROCEDURE — 88305 TISSUE EXAM BY PATHOLOGIST: CPT | Performed by: PATHOLOGY

## 2019-01-23 PROCEDURE — 11102 PR TANGENTIAL BIOPSY, SKIN, SINGLE LESION: ICD-10-PCS | Mod: S$GLB,,, | Performed by: DERMATOLOGY

## 2019-01-23 PROCEDURE — 99999 PR PBB SHADOW E&M-EST. PATIENT-LVL III: ICD-10-PCS | Mod: PBBFAC,,, | Performed by: DERMATOLOGY

## 2019-01-23 NOTE — LETTER
January 23, 2019      Christy Boyce, NP  [address]  [place] [address]           [place] [facility]  [address]  [place] [address]  Phone: [phone]          Patient: Ad Lyons   MR Number: [id number]   YOB: 1955   Date of Visit: 1/23/2019       Dear Christy Boyce:    Thank you for referring Ad Lyons to me for evaluation. Attached you will find relevant portions of my assessment and plan of care.    If you have questions, please do not hesitate to call me. I look forward to following Ad Lyons along with you.    Sincerely,    Madyson Greenwood MD    Enclosure  CC:  No Recipients    If you would like to receive this communication electronically, please contact [email or web address] or [phone] to request more information on [employer] access.    For providers and/or their staff who would like to refer a patient to [facility], please contact us through our one-stop-shop provider referral line, [facility] , at [phone].    If you feel you have received this communication in error or would no longer like to receive these types of communications, please e-mail [email or web address]

## 2019-01-23 NOTE — PROGRESS NOTES
Subjective:       Patient ID:  Ad Lyons is a 64 y.o. male who presents for   Chief Complaint   Patient presents with    Lesion     Present for initial visit with c/o lesion to right temple x 3 months. Will itch when touched and stated it was hard. Not treating.  Desired UBSE.     No known phx or fhx of skin cancer.     Past Medical History:  No date: Mills's esophagus  No date: DDD (degenerative disc disease), cervical  No date: Ear ringing  No date: Gallbladder disease  No date: Gastric ulcer with perforation  No date: GERD (gastroesophageal reflux disease)  No date: Kidney stone  2017: Meniere disease      Comment:  Dr TAYLA Mohan  No date: Vertigo          Review of Systems   Constitutional: Negative for fever and chills.   HENT: Negative for sore throat.    Gastrointestinal: Negative for nausea and vomiting.   Skin: Negative for itching, rash and dry skin.        Objective:    Physical Exam   Constitutional: He appears well-developed and well-nourished. No distress.   Eyes: Lids are normal.  No conjunctival no injection.   Neurological: He is alert and oriented to person, place, and time. He is not disoriented.   Psychiatric: He has a normal mood and affect.   Skin:   Areas Examined (abnormalities noted in diagram):   Scalp / Hair Palpated and Inspected  Head / Face Inspection Performed  Neck Inspection Performed  Chest / Axilla Inspection Performed  Abdomen Inspection Performed  Back Inspection Performed  RUE Inspected  LUE Inspection Performed                   Diagram Legend     Erythematous scaling macule/papule c/w actinic keratosis       Vascular papule c/w angioma      Pigmented verrucoid papule/plaque c/w seborrheic keratosis      Yellow umbilicated papule c/w sebaceous hyperplasia      Irregularly shaped tan macule c/w lentigo     1-2 mm smooth white papules consistent with Milia      Movable subcutaneous cyst with punctum c/w epidermal inclusion cyst      Subcutaneous movable cyst c/w pilar  cyst      Firm pink to brown papule c/w dermatofibroma      Pedunculated fleshy papule(s) c/w skin tag(s)      Evenly pigmented macule c/w junctional nevus     Mildly variegated pigmented, slightly irregular-bordered macule c/w mildly atypical nevus      Flesh colored to evenly pigmented papule c/w intradermal nevus       Pink pearly papule/plaque c/w basal cell carcinoma      Erythematous hyperkeratotic cursted plaque c/w SCC      Surgical scar with no sign of skin cancer recurrence      Open and closed comedones      Inflammatory papules and pustules      Verrucoid papule consistent consistent with wart     Erythematous eczematous patches and plaques     Dystrophic onycholytic nail with subungual debris c/w onychomycosis     Umbilicated papule    Erythematous-base heme-crusted tan verrucoid plaque consistent with inflamed seborrheic keratosis     Erythematous Silvery Scaling Plaque c/w Psoriasis     See annotation          Assessment / Plan:      Pathology Orders:     Normal Orders This Visit    Tissue Specimen To Pathology, Dermatology     Questions:    Directional Terms:  Other(comment)    Clinical information:  ISK vs scc    Specific Site:  right temple        Neoplasm of uncertain behavior of skin  -     Tissue Specimen To Pathology, Dermatology    Shave biopsy procedure note:    Shave biopsy performed after verbal consent including risk of infection, scar, recurrence, need for additional treatment of site. Area prepped with alcohol, anesthetized with approximately 1.0cc of 1% lidocaine with epinephrine. Lesional tissue shaved with razor blade. Hemostasis achieved with application of aluminum chloride followed by hyfrecation. No complications. Dressing applied. Wound care explained.      Actinic keratoses  Cryosurgery Procedure Note    Verbal consent from the patient is obtained and the patient is aware of the precancerous quality and need for treatment of these lesions. Liquid nitrogen cryosurgery is applied  to the 2 actinic keratoses, as detailed in the physical exam, to produce a freeze injury. The patient is aware that blisters may form and is instructed on wound care with gentle cleansing and use of vaseline ointment to keep moist until healed. The patient is supplied a handout on cryosurgery and is instructed to call if lesions do not completely resolve. Discussed risk postinflammatory pigmentary changes.       Skin cancer screening  Upper body skin examination performed today including at least 6 points as noted in physical examination. No lesions suspicious for malignancy noted.        Angioma  This is a benign vascular lesion. Reassurance given. No treatment required.              Follow-up in about 6 months (around 7/23/2019).

## 2019-01-28 DIAGNOSIS — H81.02 ACTIVE COCHLEOVESTIBULAR MENIERE'S DISEASE OF LEFT EAR: ICD-10-CM

## 2019-01-28 RX ORDER — TRIAMTERENE AND HYDROCHLOROTHIAZIDE 37.5; 25 MG/1; MG/1
CAPSULE ORAL
Qty: 90 CAPSULE | Refills: 0 | Status: SHIPPED | OUTPATIENT
Start: 2019-01-28 | End: 2019-05-13 | Stop reason: SDUPTHER

## 2019-03-20 ENCOUNTER — TELEPHONE (OUTPATIENT)
Dept: FAMILY MEDICINE | Facility: CLINIC | Age: 64
End: 2019-03-20

## 2019-03-20 DIAGNOSIS — Z23 NEED FOR PNEUMOCOCCAL VACCINE: Primary | ICD-10-CM

## 2019-03-20 NOTE — TELEPHONE ENCOUNTER
No answer- pneumovax 23 order in-sent email to patient to respond to what day he wants injection.

## 2019-03-20 NOTE — TELEPHONE ENCOUNTER
----- Message from Damari Garza sent at 3/20/2019  3:52 PM CDT -----  Contact: [phone]  Patient is requesting a call back from the nurse to setup pneumonia, he received notice from Megan.  Please call the patient upon request at phone number [phone].

## 2019-03-21 ENCOUNTER — PATIENT MESSAGE (OUTPATIENT)
Dept: FAMILY MEDICINE | Facility: CLINIC | Age: 64
End: 2019-03-21

## 2019-03-21 NOTE — TELEPHONE ENCOUNTER
----- Message from Aster Stratton sent at 3/21/2019 12:23 PM CDT -----  Contact: patient  Type:  Patient Returning Call    Who Called:  patient  Who Left Message for Patient:  aury  Does the patient know what this is regarding?:  injections  Best Call Back Number:  [phone]  Additional Information:

## 2019-04-10 DIAGNOSIS — K21.9 GASTROESOPHAGEAL REFLUX DISEASE, ESOPHAGITIS PRESENCE NOT SPECIFIED: ICD-10-CM

## 2019-04-10 RX ORDER — OMEPRAZOLE 40 MG/1
40 CAPSULE, DELAYED RELEASE ORAL DAILY
Qty: 90 CAPSULE | Refills: 2 | Status: SHIPPED | OUTPATIENT
Start: 2019-04-10 | End: 2019-05-03 | Stop reason: SDUPTHER

## 2019-05-03 DIAGNOSIS — K21.9 GASTROESOPHAGEAL REFLUX DISEASE, ESOPHAGITIS PRESENCE NOT SPECIFIED: ICD-10-CM

## 2019-05-03 DIAGNOSIS — J30.9 ALLERGIC SINUSITIS: ICD-10-CM

## 2019-05-03 RX ORDER — FLUTICASONE PROPIONATE 50 MCG
2 SPRAY, SUSPENSION (ML) NASAL DAILY
Qty: 16 G | Refills: 5 | Status: SHIPPED | OUTPATIENT
Start: 2019-05-03 | End: 2019-07-01 | Stop reason: SDUPTHER

## 2019-05-03 RX ORDER — OMEPRAZOLE 40 MG/1
40 CAPSULE, DELAYED RELEASE ORAL DAILY
Qty: 90 CAPSULE | Refills: 2 | Status: SHIPPED | OUTPATIENT
Start: 2019-05-03 | End: 2019-09-04

## 2019-05-03 RX ORDER — AZELASTINE 1 MG/ML
1 SPRAY, METERED NASAL 2 TIMES DAILY
Qty: 30 ML | Refills: 5 | Status: SHIPPED | OUTPATIENT
Start: 2019-05-03 | End: 2019-07-01 | Stop reason: SDUPTHER

## 2019-05-03 NOTE — TELEPHONE ENCOUNTER
Advised pt that medications requested has been sent to [facility]. Understanding verbalized.

## 2019-05-03 NOTE — TELEPHONE ENCOUNTER
----- Message from Aster Stratton sent at 5/3/2019 11:04 AM CDT -----  Contact: patient  Type:  RX Refill Request    Who Called:  patient  Refill or New Rx:  refill  RX Name and Strength:    1. omeprazole (PRILOSEC) 40 MG capsule  2. fluticasone (FLONASE) 50 mcg/actuation nasal spray  3. azelastine (ASTELIN) 137 mcg (0.1 %) nasal spray    How is the patient currently taking it? (ex. 1XDay):    1.  TAKE 1 CAPSULE (40 MG TOTAL) BY MOUTH ONCE DAILY. - Oral  2.  2 sprays (100 mcg total) by Each Nare route Daily. - Each Nare  3. 1 spray (137 mcg total) by Nasal route 2 (two) times daily. - Nasal    Is this a 30 day or 90 day RX:   [facility]    Local or Mail Order:  Mail order  Ordering Provider:  camila Kumar Call Back Number:  [phone]  Additional Information:  Patient is switching his pharmacy to [facility]

## 2019-05-09 ENCOUNTER — NURSE TRIAGE (OUTPATIENT)
Dept: ADMINISTRATIVE | Facility: CLINIC | Age: 64
End: 2019-05-09

## 2019-05-09 NOTE — TELEPHONE ENCOUNTER
Reason for Disposition   SEVERE dizziness (e.g., unable to stand, requires support to walk, feels like passing out now)    Protocols used: DIZZINESS-A-OH    Pt with recent flights. States he arrived back in [facility] last night and has vertigo, ringing in ears, rapid eye movement,  nauseas, and vomited last night. ED recommended. Pt would like appt with PCP, please call to advise.

## 2019-05-13 ENCOUNTER — OFFICE VISIT (OUTPATIENT)
Dept: FAMILY MEDICINE | Facility: CLINIC | Age: 64
End: 2019-05-13
Attending: FAMILY MEDICINE
Payer: MEDICARE

## 2019-05-13 ENCOUNTER — DOCUMENTATION ONLY (OUTPATIENT)
Dept: FAMILY MEDICINE | Facility: CLINIC | Age: 64
End: 2019-05-13

## 2019-05-13 VITALS
DIASTOLIC BLOOD PRESSURE: 70 MMHG | RESPIRATION RATE: 16 BRPM | BODY MASS INDEX: 28.88 KG/M2 | OXYGEN SATURATION: 96 % | SYSTOLIC BLOOD PRESSURE: 114 MMHG | WEIGHT: 225.06 LBS | TEMPERATURE: 99 F | HEART RATE: 63 BPM | HEIGHT: 74 IN

## 2019-05-13 DIAGNOSIS — H81.02 ACTIVE COCHLEOVESTIBULAR MENIERE'S DISEASE OF LEFT EAR: Primary | ICD-10-CM

## 2019-05-13 DIAGNOSIS — H93.13 TINNITUS OF BOTH EARS: ICD-10-CM

## 2019-05-13 DIAGNOSIS — H90.3 SENSORINEURAL HEARING LOSS (SNHL) OF BOTH EARS: ICD-10-CM

## 2019-05-13 PROCEDURE — 1101F PR PT FALLS ASSESS DOC 0-1 FALLS W/OUT INJ PAST YR: ICD-10-PCS | Mod: S$GLB,,, | Performed by: FAMILY MEDICINE

## 2019-05-13 PROCEDURE — 99999 PR PBB SHADOW E&M-EST. PATIENT-LVL III: CPT | Mod: PBBFAC,,, | Performed by: FAMILY MEDICINE

## 2019-05-13 PROCEDURE — 99214 PR OFFICE/OUTPT VISIT, EST, LEVL IV, 30-39 MIN: ICD-10-PCS | Mod: S$GLB,,, | Performed by: FAMILY MEDICINE

## 2019-05-13 PROCEDURE — 99999 PR PBB SHADOW E&M-EST. PATIENT-LVL III: ICD-10-PCS | Mod: PBBFAC,,, | Performed by: FAMILY MEDICINE

## 2019-05-13 PROCEDURE — 1101F PT FALLS ASSESS-DOCD LE1/YR: CPT | Mod: S$GLB,,, | Performed by: FAMILY MEDICINE

## 2019-05-13 PROCEDURE — 3008F PR BODY MASS INDEX (BMI) DOCUMENTED: ICD-10-PCS | Mod: S$GLB,,, | Performed by: FAMILY MEDICINE

## 2019-05-13 PROCEDURE — 3008F BODY MASS INDEX DOCD: CPT | Mod: S$GLB,,, | Performed by: FAMILY MEDICINE

## 2019-05-13 PROCEDURE — 99214 OFFICE O/P EST MOD 30 MIN: CPT | Mod: S$GLB,,, | Performed by: FAMILY MEDICINE

## 2019-05-13 RX ORDER — TRIAMTERENE AND HYDROCHLOROTHIAZIDE 37.5; 25 MG/1; MG/1
CAPSULE ORAL
Qty: 90 CAPSULE | Refills: 1 | Status: SHIPPED | OUTPATIENT
Start: 2019-05-13 | End: 2020-04-07 | Stop reason: SDUPTHER

## 2019-05-13 NOTE — PROGRESS NOTES
Pre-Visit Chart Review  For Appointment Scheduled on 5-13-19    Health Maintenance Due   Topic Date Due    TETANUS VACCINE  01/15/1973    Pneumococcal Vaccine (65+ Low/Medium Risk) (1 of 2 - PCV13) 01/15/2019

## 2019-05-13 NOTE — PROGRESS NOTES
Subjective:       Patient ID: Ad Lyons is a 64 y.o. male.    Chief Complaint: Dizziness    64-year-old male with a history of Meniere's disease chiefly affecting the left ear, bilateral tinnitus, and bilateral sensorineural hearing loss comes in with vertigo associated with nausea that started on a recent trip by air to [facility].  The patient states he had been eating ham prior to onset and has been drinking Gatorade regularly, daily.  He had been told in the past to avoid salt intake and was not initially placed on Dyazide due to history of kidney stones.  He was keeping the min years under good control for a number of months but in January, Dr. Roach had sent in a prescription for Dyazide.  His vertigo and nausea were so bad that he was somewhat diaphoretic in the airport on the return trip and paramedics wanted him to go to an emergency room for evaluation.  He had no complaints of chest pain and he had no peripheral weakness or loss of sensation but he was somewhat concerned about a possible stroke.  He is taking the Dyazide 1/2 tablet daily, he continues to take the Gatorade.  His symptoms are slowly improving.  I suspect the altitude and pressure changes involved in the air flight may have been a triggering factor.    Past Medical History:  No date: Mills's esophagus  No date: DDD (degenerative disc disease), cervical  No date: Ear ringing  No date: Gallbladder disease  No date: Gastric ulcer with perforation  No date: GERD (gastroesophageal reflux disease)  No date: Kidney stone  2017: Meniere disease      Comment:  Dr TAYLA Mohan  No date: Vertigo    Past Surgical History:  No date: ADENOIDECTOMY  No date: CHOLECYSTECTOMY  12/27/2017: COLONOSCOPY; N/A      Comment:  Performed by Adrian Castillo MD at [facility] ENDO  1/2013: COLONOSCOPY W/ POLYPECTOMY      Comment:  Dr Hughes  12/26/2016: ERCP      Comment:  [facility] note  12/26/2016: ERCP; N/A      Comment:  Performed by Dejuan Tyler MD  at [facility] OR  1/2013: ESOPHAGOGASTRODUODENOSCOPY      Comment:  Dr Hughes  8/31/2017: ESOPHAGOGASTRODUODENOSCOPY (EGD); N/A      Comment:  Performed by Adrian Castillo MD at [facility] ENDO  5/9/2016: ESOPHAGOGASTRODUODENOSCOPY (EGD); N/A      Comment:  Performed by Adrian Castillo MD at [facility] ENDO  No date: TONSILLECTOMY  No date: VASECTOMY    Current Outpatient Medications on File Prior to Visit:  azelastine (ASTELIN) 137 mcg (0.1 %) nasal spray, 1 spray (137 mcg total) by Nasal route 2 (two) times daily., Disp: 30 mL, Rfl: 5  cyanocobalamin, vitamin B-12, (VITAMIN B-12) 1,000 mcg Subl, Place 1 tablet under the tongue once daily. , Disp: , Rfl:   fluticasone propionate (FLONASE) 50 mcg/actuation nasal spray, 2 sprays (100 mcg total) by Each Nare route Daily., Disp: 16 g, Rfl: 5  LOPERAMIDE HCL (IMODIUM A-D ORAL), Take 2 tablets by mouth daily as needed., Disp: , Rfl:   meclizine (ANTIVERT) 25 mg tablet, Take 1 tablet (25 mg total) by mouth 3 (three) times daily as needed for Dizziness., Disp: 60 tablet, Rfl: 0  omeprazole (PRILOSEC) 40 MG capsule, Take 1 capsule (40 mg total) by mouth once daily., Disp: 90 capsule, Rfl: 2  ondansetron (ZOFRAN-ODT) 8 MG TbDL, Take 1 tablet (8 mg total) by mouth every 8 (eight) hours. (Patient taking differently: Take 8 mg by mouth every 8 (eight) hours as needed. ), Disp: 6 tablet, Rfl: 3  sucralfate (CARAFATE) 1 gram tablet, Take 1 tablet (1 g total) by mouth 4 (four) times daily before meals and nightly. Dissolve pill in 2 tablespoons water and drink (Patient taking differently: Take 1 g by mouth 4 (four) times daily as needed. Dissolve pill in 2 tablespoons water and drink), Disp: 120 tablet, Rfl: 5  (DISCONTINUED) triamterene-hydrochlorothiazide 37.5-25 mg (DYAZIDE) 37.5-25 mg per capsule, 1/2 to 1 tablet every day during Meniere's episode, Disp: 90 capsule, Rfl: 0  (DISCONTINUED) chlorhexidine (PERIDEX) 0.12 % solution, RINSE WITH 1/2 OZ. ONCE DAILY FOR 5 DAYS, Disp: 473 mL, Rfl:  0  (DISCONTINUED) cholestyramine, with sugar, 4 gram Powd, 1-3 scoops per day for diarrhea after gallbladder surgery, Disp: 378 g, Rfl: 5    No current facility-administered medications on file prior to visit.         Review of Systems   Cardiovascular: Negative for chest pain and palpitations.   Gastrointestinal: Positive for nausea and vomiting. Negative for abdominal distention, abdominal pain, constipation and diarrhea.   Neurological: Positive for dizziness and light-headedness. Negative for headaches.       Objective:      Physical Exam   Constitutional: He is oriented to person, place, and time. He appears well-developed. No distress.   Good blood pressure control, normal pulse with regular rhythm  Normal weight with a BMI of 28.9 is down 1.7 lb from his March 28, 2018 visit with me   HENT:   Head: Normocephalic and atraumatic.   Right Ear: External ear normal.   Left Ear: External ear normal.   Nose: Nose normal.   Mouth/Throat: Oropharynx is clear and moist. No oropharyngeal exudate.   Bilateral hearing aids   Eyes: Pupils are equal, round, and reactive to light. EOM are normal. No scleral icterus.   Neck: Normal range of motion. Neck supple. No JVD present. No tracheal deviation present. No thyromegaly present.   Cardiovascular: Normal rate, regular rhythm and normal heart sounds. Exam reveals no gallop and no friction rub.   No murmur heard.  Carotid pulses 2+ without bruit   Pulmonary/Chest: Effort normal and breath sounds normal. No stridor. No respiratory distress. He has no wheezes. He has no rales. He exhibits no tenderness.   Lymphadenopathy:     He has no cervical adenopathy.   Neurological: He is alert and oriented to person, place, and time.   Skin: He is not diaphoretic.   Psychiatric: He has a normal mood and affect. His behavior is normal. Judgment and thought content normal.   Nursing note and vitals reviewed.      Assessment:       1. Active cochleovestibular Meniere's disease of left ear     2. Sensorineural hearing loss (SNHL) of both ears    3. Tinnitus of both ears    4. BMI 28.0-28.9,adult        Plan:       1. Active cochleovestibular Meniere's disease of left ear  Patient may increase Dyazide to a full tablet if needed but since he is improving I would recommend discontinue the Gatorade and cut back on the salt intake as a primary control method  - triamterene-hydrochlorothiazide 37.5-25 mg (DYAZIDE) 37.5-25 mg per capsule; 1/2 to 1 tablet every day during Meniere's episode  Dispense: 90 capsule; Refill: 1    2. Sensorineural hearing loss (SNHL) of both ears  Bilateral hearing aids    3. Tinnitus of both ears    4. BMI 28.0-28.9,adult

## 2019-06-18 ENCOUNTER — TELEPHONE (OUTPATIENT)
Dept: AUDIOLOGY | Facility: CLINIC | Age: 64
End: 2019-06-18

## 2019-06-18 NOTE — TELEPHONE ENCOUNTER
"Pt scheduled an appt for "VNG" on Bibiana Roach's schedule in ENT for 6/28/19 via [facility].  This appt is for an ENT consult, not for the actual test.  Called pt to schedule for VNG test if that is what is needed.  LMOV for pt to call Audiology dept back directly to schedule.   "

## 2019-06-20 NOTE — TELEPHONE ENCOUNTER
Pt wants to have a VNG but this test has not been requested by any provider and he is asymptomatic at this time.  [facility] iBbiana Roach for a VNG test and was informed that she does not do the test but that it would be appropriate for him to keep the consult appt with her to discuss his concerns for and reasons for wanting a VNG.  If she agrees that it is appropriate for him to have a VNG then she can put in a referral and I will call him to schedule a VNG.

## 2019-06-28 ENCOUNTER — OFFICE VISIT (OUTPATIENT)
Dept: OTOLARYNGOLOGY | Facility: CLINIC | Age: 64
End: 2019-06-28
Payer: MEDICARE

## 2019-06-28 VITALS
DIASTOLIC BLOOD PRESSURE: 78 MMHG | WEIGHT: 220.44 LBS | BODY MASS INDEX: 28.31 KG/M2 | SYSTOLIC BLOOD PRESSURE: 119 MMHG

## 2019-06-28 DIAGNOSIS — H81.02: ICD-10-CM

## 2019-06-28 DIAGNOSIS — H93.13 TINNITUS, BILATERAL: Primary | ICD-10-CM

## 2019-06-28 DIAGNOSIS — H83.3X1 ACOUSTIC TRAUMA OF RIGHT EAR: ICD-10-CM

## 2019-06-28 PROCEDURE — 99999 PR PBB SHADOW E&M-EST. PATIENT-LVL III: ICD-10-PCS | Mod: PBBFAC,,, | Performed by: NURSE PRACTITIONER

## 2019-06-28 PROCEDURE — 69210 REMOVE IMPACTED EAR WAX UNI: CPT | Mod: S$GLB,,, | Performed by: NURSE PRACTITIONER

## 2019-06-28 PROCEDURE — 3008F PR BODY MASS INDEX (BMI) DOCUMENTED: ICD-10-PCS | Mod: S$GLB,,, | Performed by: NURSE PRACTITIONER

## 2019-06-28 PROCEDURE — 1100F PR PT FALLS ASSESS DOC 2+ FALLS/FALL W/INJURY/YR: ICD-10-PCS | Mod: S$GLB,,, | Performed by: NURSE PRACTITIONER

## 2019-06-28 PROCEDURE — 1100F PTFALLS ASSESS-DOCD GE2>/YR: CPT | Mod: S$GLB,,, | Performed by: NURSE PRACTITIONER

## 2019-06-28 PROCEDURE — 3288F FALL RISK ASSESSMENT DOCD: CPT | Mod: S$GLB,,, | Performed by: NURSE PRACTITIONER

## 2019-06-28 PROCEDURE — 3008F BODY MASS INDEX DOCD: CPT | Mod: S$GLB,,, | Performed by: NURSE PRACTITIONER

## 2019-06-28 PROCEDURE — 69210 PR REMOVAL IMPACTED CERUMEN REQUIRING INSTRUMENTATION, UNILATERAL: ICD-10-PCS | Mod: S$GLB,,, | Performed by: NURSE PRACTITIONER

## 2019-06-28 PROCEDURE — 99999 PR PBB SHADOW E&M-EST. PATIENT-LVL III: CPT | Mod: PBBFAC,,, | Performed by: NURSE PRACTITIONER

## 2019-06-28 PROCEDURE — 3288F PR FALLS RISK ASSESSMENT DOCUMENTED: ICD-10-PCS | Mod: S$GLB,,, | Performed by: NURSE PRACTITIONER

## 2019-06-28 PROCEDURE — 99213 PR OFFICE/OUTPT VISIT, EST, LEVL III, 20-29 MIN: ICD-10-PCS | Mod: 25,S$GLB,, | Performed by: NURSE PRACTITIONER

## 2019-06-28 PROCEDURE — 99213 OFFICE O/P EST LOW 20 MIN: CPT | Mod: 25,S$GLB,, | Performed by: NURSE PRACTITIONER
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
## 2019-07-01 DIAGNOSIS — J30.9 ALLERGIC SINUSITIS: ICD-10-CM

## 2019-07-01 DIAGNOSIS — K22.70 BARRETT'S ESOPHAGUS WITHOUT DYSPLASIA: ICD-10-CM

## 2019-07-01 DIAGNOSIS — K21.9 GASTROESOPHAGEAL REFLUX DISEASE, ESOPHAGITIS PRESENCE NOT SPECIFIED: ICD-10-CM

## 2019-07-01 RX ORDER — SUCRALFATE 1 G/1
1 TABLET ORAL 4 TIMES DAILY PRN
Qty: 360 TABLET | Refills: 3 | Status: SHIPPED | OUTPATIENT
Start: 2019-07-01 | End: 2020-05-18

## 2019-07-01 RX ORDER — AZELASTINE 1 MG/ML
1 SPRAY, METERED NASAL 2 TIMES DAILY
Qty: 90 ML | Refills: 3 | Status: SHIPPED | OUTPATIENT
Start: 2019-07-01 | End: 2020-10-30 | Stop reason: SDUPTHER

## 2019-07-01 RX ORDER — FLUTICASONE PROPIONATE 50 MCG
2 SPRAY, SUSPENSION (ML) NASAL DAILY
Qty: 3 BOTTLE | Refills: 3 | Status: SHIPPED | OUTPATIENT
Start: 2019-07-01 | End: 2020-10-26 | Stop reason: SDUPTHER

## 2019-07-01 NOTE — TELEPHONE ENCOUNTER
----- Message from Abida Fuentes sent at 7/1/2019 10:36 AM CDT -----  Contact: self [phone]  He is calling to request 90 day supplies of  azelastine (ASTELIN) 137 mcg (0.1 %) nasal spray, fluticasone propionate (FLONASE) 50 mcg/actuation nasal spray and sucralfate (CARAFATE) 1 gram tablet. Please send those to:     [facility] - [address] [address]  Phone: [phone] Fax: [phone]    Thank you!

## 2019-07-18 ENCOUNTER — CLINICAL SUPPORT (OUTPATIENT)
Dept: AUDIOLOGY | Facility: CLINIC | Age: 64
End: 2019-07-18
Payer: MEDICARE

## 2019-07-18 DIAGNOSIS — R42 VERTIGO: Primary | ICD-10-CM

## 2019-07-18 DIAGNOSIS — H81.92 PERIPHERAL VESTIBULOPATHY OF LEFT EAR: ICD-10-CM

## 2019-07-18 DIAGNOSIS — H81.4 CENTRAL VESTIBULAR VERTIGO: Primary | ICD-10-CM

## 2019-07-18 PROCEDURE — 92540 BASIC VESTIBULAR EVALUATION: CPT | Mod: S$GLB,,, | Performed by: AUDIOLOGIST

## 2019-07-18 PROCEDURE — 92537 PR CALORIC VSTBLR TEST W/REC BITHERMAL: ICD-10-PCS | Mod: S$GLB,,, | Performed by: AUDIOLOGIST

## 2019-07-18 PROCEDURE — 92540 PR VESTIBULAR EVAL NYSTAG FOVL&PERPH STIM OSCIL TRACKING: ICD-10-PCS | Mod: S$GLB,,, | Performed by: AUDIOLOGIST

## 2019-07-18 PROCEDURE — 92537 CALORIC VSTBLR TEST W/REC: CPT | Mod: S$GLB,,, | Performed by: AUDIOLOGIST

## 2019-07-18 NOTE — Clinical Note
Bibiana, pt's VNG showed both central and peripheral abnormalities. Pt had a 100% left caloric weakness and abnormal oculomotor tests.  Rec. F/u with ENT for further discussion regarding his treatment options.  He does not have balance issues when he is not having a vertigo attack so not necessarily in need of VRT at this time.

## 2019-07-18 NOTE — PROGRESS NOTES
Referring provider:  Bibiana Roach NP    Ad Lyons was seen 2019 for Videonystagmography (VNG) testing.      Pt stated that he had some vertigo a few days after his appt on 19.  He only has issues with his balance during vertigo episodes.  He is stable and well-balanced in between episodes.  He feels that his hearing may be worse than on his 18 audiogram for his left ear but does not want to repeat the hearing test at this time.  He prefers to wait until he schedules an appointment at [facility] in 2019 to discuss cochlear implant candidacy.   He reportedly abstained from vestibular suppressants and alcohol for the past 24 hours.    VAT was negative bilaterally    VNG results (abnormal results italicized):   Oculomotor function tests:   Sinusoidal tracking - abnormal but symmetric   Saccades - abnormal velocity; normal latency and accuracy   OPKs -  abnormal and asymmetric for 30 d/s;  Normal and symmetric for 20 d/s   Spontaneous nystagmus was absent.  Gaze nystagmus was absent.   Columb[place]-Hallpike Left was negative for BPPV but yielded 10 d/s RB nystagmus with fixation.    Jhon-Hallpike Right was negative for BPPV but yielded 3 d/s RB nystagmus with fixation.   Static Positional nystagmus was absent.  Bi-thermal caloric irrigations revealed a 100% caloric weakness in the left ear, which is outside of normal limits, and 4% directional preponderance to the right, which is within normal limits.  Fixation suppression following caloric irrigations were normal.  RC: 11 d/s    RW: 12 d/s    d/s    LW: 0 d/s     Impressions:  Abnormal VNG indicative of both central and peripheral abnormalities.  100% left ear vestibular weakness.  Negative for BPPV bilaterally.    Abnormal VNG results:  1. Abnormal oculomotor test performance for sinusoidal tracking, saccades and OPKs (30 d/s)  2. Positional nystagmus not consistent with traditional BPPV for Jhon-Hallpike to both sides  3. 100% left ear  caloric weakness    Recommendations:  1. ENT review of results  2. Referral to Neurology    Tracings will be scanned into patient's chart.

## 2019-08-19 ENCOUNTER — TELEPHONE (OUTPATIENT)
Dept: FAMILY MEDICINE | Facility: CLINIC | Age: 64
End: 2019-08-19

## 2019-08-19 RX ORDER — CHLORHEXIDINE GLUCONATE ORAL RINSE 1.2 MG/ML
SOLUTION DENTAL
Qty: 473 ML | Refills: 3 | Status: SHIPPED | OUTPATIENT
Start: 2019-08-19 | End: 2020-04-02 | Stop reason: SDUPTHER

## 2019-08-19 NOTE — TELEPHONE ENCOUNTER
Done    I sent in 473 mL which is the largest bottle it comes in.  If he uses it every days that would be a little over a month supply.  The instructions say to use it for five days

## 2019-08-19 NOTE — TELEPHONE ENCOUNTER
----- Message from Digna Ko sent at 8/17/2019  8:49 AM CDT -----  Contact: Patient  Type:  RX Refill Request    Who Called:  Patient  Refill or New Rx:  New  RX Name and Strength:  Peridex 0.12 % (the generic brand)  How is the patient currently taking it? (ex. 1XDay):  na  Is this a 30 day or 90 day RX: 90 day   Preferred Pharmacy with phone number:    [facility] - [address] [address]  Phone: [phone] Fax: [phone]    Local or Mail Order:  Mail Order  Ordering Provider: Historical Provider  Best Call Back Number:  [phone]  Additional Information:  Calling to request a new prescription

## 2019-09-04 ENCOUNTER — TELEPHONE (OUTPATIENT)
Dept: FAMILY MEDICINE | Facility: CLINIC | Age: 64
End: 2019-09-04

## 2019-09-04 DIAGNOSIS — K21.9 GASTROESOPHAGEAL REFLUX DISEASE, ESOPHAGITIS PRESENCE NOT SPECIFIED: ICD-10-CM

## 2019-09-04 RX ORDER — OMEPRAZOLE 40 MG/1
40 CAPSULE, DELAYED RELEASE ORAL
Qty: 180 CAPSULE | Refills: 3 | Status: SHIPPED | OUTPATIENT
Start: 2019-09-04 | End: 2021-04-23 | Stop reason: SDUPTHER

## 2019-09-04 NOTE — TELEPHONE ENCOUNTER
Called patient- Omeprazole 40mg at one daily not helping his gerd. He is asking to go back to twice daily[facility]

## 2019-09-04 NOTE — TELEPHONE ENCOUNTER
----- Message from Celestino Shipman sent at 9/4/2019  9:10 AM CDT -----  Contact: Patient  Type:  RX Refill Request    Who Called:  Patient  Refill or New Rx:  Refill  RX Name and Strength:  omeprazole (PRILOSEC) 40 MG capsule  How is the patient currently taking it? (ex. 1XDay):  As ordered  Is this a 30 day or 90 day RX:  90  Preferred Pharmacy with phone number:    [facility] - [address] [address]  Phone: [phone] Fax: [phone]  Local or Mail Order:  Mail  Ordering Provider:  Dr. Raven Kumar Call Back Number:  [phone]  Additional Information:  Patient would like to discuss changing the medication directions from once a day to twice a day. Please call to advise. Thanks!

## 2019-09-05 ENCOUNTER — CLINICAL SUPPORT (OUTPATIENT)
Dept: URGENT CARE | Facility: CLINIC | Age: 64
End: 2019-09-05
Payer: MEDICARE

## 2019-09-05 VITALS
DIASTOLIC BLOOD PRESSURE: 75 MMHG | TEMPERATURE: 98 F | SYSTOLIC BLOOD PRESSURE: 123 MMHG | HEIGHT: 74 IN | HEART RATE: 62 BPM | RESPIRATION RATE: 16 BRPM | BODY MASS INDEX: 29 KG/M2 | WEIGHT: 226 LBS | OXYGEN SATURATION: 97 %

## 2019-09-05 DIAGNOSIS — R12 HEARTBURN: Primary | ICD-10-CM

## 2019-09-05 PROCEDURE — 93000 PR ELECTROCARDIOGRAM, COMPLETE: ICD-10-PCS | Mod: S$GLB,,, | Performed by: NURSE PRACTITIONER

## 2019-09-05 PROCEDURE — 99204 PR OFFICE/OUTPT VISIT, NEW, LEVL IV, 45-59 MIN: ICD-10-PCS | Mod: 25,S$GLB,, | Performed by: NURSE PRACTITIONER

## 2019-09-05 PROCEDURE — 99204 OFFICE O/P NEW MOD 45 MIN: CPT | Mod: 25,S$GLB,, | Performed by: NURSE PRACTITIONER

## 2019-09-05 PROCEDURE — 93000 ELECTROCARDIOGRAM COMPLETE: CPT | Mod: S$GLB,,, | Performed by: NURSE PRACTITIONER

## 2019-09-05 NOTE — PROGRESS NOTES
"Subjective:       Patient ID: Ad Lyons is a 64 y.o. male.    Vitals:  height is 6' 2" (1.88 m) and weight is 102.5 kg (226 lb). His oral temperature is 97.6 °F (36.4 °C). His blood pressure is 123/75 and his pulse is 62. His respiration is 16 and oxygen saturation is 97%.     Chief Complaint: Heartburn    Patient complains of heartburn and mild epigastric discomfort for 3 days. Patient reports the pain is worse with eating. States he has a history of GERD and this feels like previous episodes. Denies shortness of breath, chest pain, nausea, vomiting, diarrhea.       Heartburn   He complains of heartburn and a sore throat. He reports no abdominal pain, no chest pain, no coughing or no nausea. This is a new problem. The current episode started yesterday. The problem occurs constantly. The problem has been unchanged. The heartburn is of severe intensity. The heartburn wakes him from sleep. Pertinent negatives include no fatigue.       Constitution: Negative for chills, fatigue and fever.   HENT: Positive for sore throat. Negative for congestion.    Neck: Negative for painful lymph nodes.   Cardiovascular: Negative for chest pain and leg swelling.   Eyes: Negative for double vision and blurred vision.   Respiratory: Negative for cough and shortness of breath.    Gastrointestinal: Positive for heartburn. Negative for abdominal pain, nausea, vomiting and diarrhea.   Endocrine: negative.   Genitourinary: Negative for dysuria, frequency and urgency.   Musculoskeletal: Negative for joint pain, joint swelling, muscle cramps and muscle ache.   Skin: Negative for color change, pale and rash.   Allergic/Immunologic: Negative for seasonal allergies.   Neurological: Negative for dizziness, history of vertigo, light-headedness, passing out and headaches.   Hematologic/Lymphatic: Negative for swollen lymph nodes, easy bruising/bleeding and history of blood clots. Does not bruise/bleed easily.   Psychiatric/Behavioral: " Negative for nervous/anxious, sleep disturbance and depression. The patient is not nervous/anxious.        Objective:      Physical Exam   Constitutional: He is oriented to person, place, and time. Vital signs are normal. He appears well-developed and well-nourished.  Non-toxic appearance. He does not have a sickly appearance. He does not appear ill.   HENT:   Head: Normocephalic.   Right Ear: Tympanic membrane, external ear and ear canal normal.   Left Ear: Tympanic membrane, external ear and ear canal normal.   Nose: Nose normal.   Mouth/Throat: Uvula is midline and oropharynx is clear and moist.   Eyes: Pupils are equal, round, and reactive to light. Conjunctivae and EOM are normal.   Neck: Normal range of motion and full passive range of motion without pain. Neck supple.   Cardiovascular: Normal rate, regular rhythm and normal heart sounds.   Pulmonary/Chest: Effort normal and breath sounds normal.   Abdominal: Soft. Normal appearance and bowel sounds are normal. There is tenderness in the epigastric area.   Musculoskeletal: Normal range of motion.   Lymphadenopathy:     He has no cervical adenopathy.   Neurological: He is alert and oriented to person, place, and time. GCS eye subscore is 4. GCS verbal subscore is 5. GCS motor subscore is 6.   Skin: Skin is warm, dry and intact. No rash noted.   Psychiatric: He has a normal mood and affect.   Vitals reviewed.      Assessment:       1. Heartburn        Plan:       EKG - sinus damari. No ST elevation    Patient is now pain free after taking the GI cocktail. Will treat as GERD and advised patient to follow up with his GI doctor. Advised patient to continue taking his Prilosec daily.  Advised patient to go to the ER for any chest pain, nausea or vomiting, pain in jaw or arm, or shortness of breath.     Heartburn  -     EKG 12-lead; Future    Other orders  -     GI cocktail (mylanta 30 mL, lidocaine 2 % viscous 10 mL, dicyclomine 10 mL) 50 mL; Take 10 mLs by mouth 3  (three) times daily as needed.  Dispense: 50 mL; Refill: 0

## 2019-09-05 NOTE — PATIENT INSTRUCTIONS
Tips to Control Acid Reflux    To control acid reflux, youll need to make some basic diet and lifestyle changes. The simple steps outlined below may be all youll need to ease discomfort.  Watch what you eat  · Avoid fatty foods and spicy foods.  · Eat fewer acidic foods, such as citrus and tomato-based foods. These can increase symptoms.  · Limit drinking alcohol, caffeine, and fizzy beverages. All increase acid reflux.  · Try limiting chocolate, peppermint, and spearmint. These can worsen acid reflux in some people.  Watch when you eat  · Avoid lying down for 3 hours after eating.  · Do not snack before going to bed.  Raise your head  Raising your head and upper body by 4 to 6 inches helps limit reflux when youre lying down. Put blocks under the head of your bed frame to raise it.  Other changes  · Lose weight, if you need to  · Dont exercise near bedtime  · Avoid tight-fitting clothes  · Limit aspirin and ibuprofen  · Stop smoking   Date Last Reviewed: 7/1/2016 © 2000-2017 [employer]. [address] [place] [address]. All rights reserved. This information is not intended as a substitute for professional medical care. Always follow your healthcare professional's instructions.        Lifestyle Changes for Controlling GERD  When you have GERD, stomach acid feels as if its backing up toward your mouth. Whether or not you take medicine to control your GERD, your symptoms can often be improved with lifestyle changes. Talk to your healthcare provider about the following suggestions. These suggestions may help you get relief from your symptoms.      Raise your head  Reflux is more likely to strike when youre lying down flat, because stomach fluid can flow backward more easily. Raising the head of your bed 4 to 6 inches can help. To do this:  · Slide blocks or books under the legs at the head of your bed. Or, place a wedge under the mattress. Many [employer] stores can make a suitable wedge for  you. The wedge should run from your waist to the top of your head.  · Dont just prop your head on several pillows. This increases pressure on your stomach. It can make GERD worse.  Watch your eating habits  Certain foods may increase the acid in your stomach or relax the lower esophageal sphincter. This makes GERD more likely. Its best to avoid the following if they cause you symptoms:  · Coffee, tea, and carbonated drinks (with and without caffeine)  · Fatty, fried, or spicy food  · Mint, chocolate, onions, and tomatoes  · Peppermint  · Any other foods that seem to irritate your stomach or cause you pain  Relieve the pressure  Tips include the following:  · Eat smaller meals, even if you have to eat more often.  · Dont lie down right after you eat. Wait a few hours for your stomach to empty.  · Avoid tight belts and tight-fitting clothes.  · Lose excess weight.  Tobacco and alcohol  Avoid smoking tobacco and drinking alcohol. They can make GERD symptoms worse.  Date Last Reviewed: 7/1/2016  © 8621-5574 [facility]. [address] [place] [address], [place] [address]. All rights reserved. This information is not intended as a substitute for professional medical care. Always follow your healthcare professional's instructions.

## 2019-10-17 ENCOUNTER — TELEPHONE (OUTPATIENT)
Dept: FAMILY MEDICINE | Facility: CLINIC | Age: 64
End: 2019-10-17

## 2019-10-17 NOTE — TELEPHONE ENCOUNTER
Patient says [facility] didn't send 180 pills of the Omeprazole as he ran out 21 days early. They are taking care of he says and does not need me to do anything.

## 2019-10-17 NOTE — TELEPHONE ENCOUNTER
----- Message from Kristina Kaur sent at 10/17/2019 10:14 AM CDT -----  Contact: pt [phone]  Patient called and asked for a call refill on his Omeprazole 40 mg to be called in for a 90 day refill he is asking for an advance refill to [facility].

## 2019-10-22 ENCOUNTER — TELEPHONE (OUTPATIENT)
Dept: FAMILY MEDICINE | Facility: CLINIC | Age: 64
End: 2019-10-22

## 2019-10-22 DIAGNOSIS — K90.89 BILE SALT-INDUCED DIARRHEA: ICD-10-CM

## 2019-10-22 RX ORDER — CHOLESTYRAMINE 4 G/9G
POWDER, FOR SUSPENSION ORAL
Qty: 1134 G | Refills: 4 | Status: SHIPPED | OUTPATIENT
Start: 2019-10-22 | End: 2021-05-13 | Stop reason: SDUPTHER

## 2019-10-22 NOTE — TELEPHONE ENCOUNTER
----- Message from Aster Stratton sent at 10/22/2019 11:03 AM CDT -----  Contact: patient  Type:  RX Refill Request    Who Called:  Patient  Refill or New Rx:  refill  RX Name and Strength:  Cholestyramine powder 4g  How is the patient currently taking it? (ex. 1XDay): 3 scoops per day  Is this a 30 day or 90 day RX:  Requesting 90day supply  Preferred Pharmacy with phone number:    [facility] - [address] [address]  Phone: [phone] Fax: [phone]  Local or Mail Order:  Mail order  Ordering Provider:  Raven Kumar Call Back Number:  [phone]  Additional Information:

## 2019-10-22 NOTE — TELEPHONE ENCOUNTER
Patient having loose and frequent stool- had his gallbladder taken out- asking to get back on Questran powder- no blood or black stool.

## 2019-11-05 ENCOUNTER — TELEPHONE (OUTPATIENT)
Dept: FAMILY MEDICINE | Facility: CLINIC | Age: 64
End: 2019-11-05

## 2019-11-05 DIAGNOSIS — K90.89 BILE SALT-INDUCED DIARRHEA: ICD-10-CM

## 2019-11-05 NOTE — TELEPHONE ENCOUNTER
Patient says he was charged 24.00 instead of free of charge on the cholestyramine- he can't remember if they told him 1134 g was a 42 day supply or is each can was a 42 day supply. He was sent 3 cans of 378g. He will call insurance and let me know.

## 2019-11-05 NOTE — TELEPHONE ENCOUNTER
----- Message from Shaunna Youngblood sent at 11/5/2019  3:39 PM CST -----  Type: Needs Medical Advice    Who Called:  Patient  Best Call Back Number: [phone]  Additional Information: Patient needs to speak with nurse concerning medication: cholestyramine, with sugar, 4 gram Powd/stated wrong quantity was ordered/should have been for 90 day supply/please call patient back to advise.

## 2019-11-19 ENCOUNTER — OFFICE VISIT (OUTPATIENT)
Dept: FAMILY MEDICINE | Facility: CLINIC | Age: 64
End: 2019-11-19
Payer: MEDICARE

## 2019-11-19 VITALS
WEIGHT: 229.94 LBS | TEMPERATURE: 98 F | DIASTOLIC BLOOD PRESSURE: 64 MMHG | HEART RATE: 61 BPM | SYSTOLIC BLOOD PRESSURE: 118 MMHG | OXYGEN SATURATION: 97 % | BODY MASS INDEX: 29.51 KG/M2 | HEIGHT: 74 IN

## 2019-11-19 DIAGNOSIS — Z23 NEED FOR SHINGLES VACCINE: ICD-10-CM

## 2019-11-19 DIAGNOSIS — I70.0 AORTIC ATHEROSCLEROSIS: Chronic | ICD-10-CM

## 2019-11-19 DIAGNOSIS — H91.93 BILATERAL HEARING LOSS, UNSPECIFIED HEARING LOSS TYPE: ICD-10-CM

## 2019-11-19 DIAGNOSIS — Z00.00 ENCOUNTER FOR PREVENTIVE HEALTH EXAMINATION: Primary | ICD-10-CM

## 2019-11-19 DIAGNOSIS — Z23 NEED FOR DIPHTHERIA-TETANUS-PERTUSSIS (TDAP) VACCINE: ICD-10-CM

## 2019-11-19 DIAGNOSIS — H81.03 MENIERE'S DISEASE, BILATERAL: ICD-10-CM

## 2019-11-19 PROBLEM — R11.2 INTRACTABLE VOMITING WITH NAUSEA: Status: RESOLVED | Noted: 2019-01-07 | Resolved: 2019-11-19

## 2019-11-19 PROBLEM — R19.7 DIARRHEA: Status: RESOLVED | Noted: 2019-01-07 | Resolved: 2019-11-19

## 2019-11-19 PROCEDURE — G0439 PPPS, SUBSEQ VISIT: HCPCS | Mod: S$GLB,,, | Performed by: NURSE PRACTITIONER

## 2019-11-19 PROCEDURE — G0439 PR MEDICARE ANNUAL WELLNESS SUBSEQUENT VISIT: ICD-10-PCS | Mod: S$GLB,,, | Performed by: NURSE PRACTITIONER

## 2019-11-19 NOTE — PATIENT INSTRUCTIONS
Get high dose flu and TDAP at pharmacy. Get these first, wait 1  Month and then get the first shingles vaccine, 2-6 months later get the second shingles vaccine.   Counseling and Referral of Other Preventative  (Italic type indicates deductible and co-insurance are waived)    Patient Name: Ad Lyons  Today's Date: 11/19/2019    Health Maintenance       Date Due Completion Date    TETANUS VACCINE 01/15/1972 ---    High Dose Statin 01/15/1975 ---    Shingles Vaccine (1 of 2) 01/15/2004 ---    Influenza Vaccine (1) 09/01/2019 1/7/2019    Colonoscopy 12/27/2022 12/27/2017    Override on 1/11/2013: Done (Dr Hughes--sent to scanning 03/28/2016)    Lipid Panel 01/08/2024 1/8/2019        Orders Placed This Encounter   Procedures    Ambulatory Referral to ENT     The following information is provided to all patients.  This information is to help you find resources for any of the problems found today that may be affecting your health:                Living healthy guide: www.[facility].[place].gov      Understanding Diabetes: www.diabetes.org      Eating healthy: www.cdc.gov/healthyweight      CDC home safety checklist: www.cdc.gov/steadi/patient.html      Agency on Aging: www.goea.[place].gov      Alcoholics anonymous (AA): www.aa.org      Physical Activity: www.keyur.nih.gov/ie1skkd      Tobacco use: www.quitwithusla.org

## 2019-11-19 NOTE — Clinical Note
I saw your patient for their annual wellness visit. I found the following issues: Concerned about Meniere's disease and hearing loss and wants second opinion from ENT at [facility]., needs vaccines, has 12.4% ASCVD risk, not interested in statin at this time.   I ordered: referrral to Dr. Aris Miller (ENT, [facility]), tdap, flu and shingles vaccines (patient wants to get all of them at the pharmacy) and have referred them back to you for their regular care. I hope this is helpful to you. NATE Quezada, FNP

## 2019-11-19 NOTE — PROGRESS NOTES
"Ad Lyons presented for a  Medicare AWV and comprehensive Health Risk Assessment today. The following components were reviewed and updated:    · Medical history  · Family History  · Social history  · Allergies and Current Medications  · Health Risk Assessment  · Health Maintenance  · Care Team     ** See Completed Assessments for Annual Wellness Visit within the encounter summary.**       The following assessments were completed:  · Living Situation  · CAGE  · Depression Screening  · Timed Get Up and Go  · Whisper Test  · Cognitive Function Screening  · Nutrition Screening  · ADL Screening  · PAQ Screening        Vitals:    11/19/19 1359   BP: 118/64   BP Location: Left arm   Patient Position: Sitting   BP Method: Small (Manual)   Pulse: 61   Temp: 97.8 °F (36.6 °C)   TempSrc: Oral   SpO2: 97%   Weight: 104.3 kg (229 lb 15 oz)   Height: 6' 2" (1.88 m)     Body mass index is 29.52 kg/m².  Physical Exam   Constitutional: He is oriented to person, place, and time. He appears well-developed and well-nourished. No distress.   HENT:   Head: Normocephalic and atraumatic.   Eyes: Conjunctivae are normal. Right eye exhibits no discharge. Left eye exhibits no discharge. No scleral icterus.   Cardiovascular: Normal rate.   Pulmonary/Chest: No respiratory distress.   Neurological: He is alert and oriented to person, place, and time.   Skin: Skin is warm and dry. He is not diaphoretic.   Psychiatric: He has a normal mood and affect. His behavior is normal.   Nursing note and vitals reviewed.        Diagnoses and health risks identified today and associated recommendations/orders:  Encounter for preventive health examination    Meniere's disease, bilateral  -     Ambulatory Referral to ENT    Aortic atherosclerosis  Comments:  Stable. Will continue to monitor    Bilateral hearing loss, unspecified hearing loss type  -     Ambulatory Referral to ENT    Need for diphtheria-tetanus-pertussis (Tdap) vaccine  -     " DIPH,PERTUSS,ACEL,,TET VAC,PF,, ADULT, (ADACEL) 2 Lf-(2.5-5-3-5 mcg)-5Lf/0.5 mL injection; Inject 0.5 mLs into the muscle once. for 1 dose  Dispense: 0.5 mL; Refill: 0    Need for shingles vaccine  -     Discontinue: varicella-zoster gE-AS01B, PF, (SHINGRIX) 50 mcg/0.5 mL injection; Inject 0.5 mLs into the muscle once. for 1 dose  Dispense: 0.5 mL; Refill: 1  -     varicella-zoster gE-AS01B, PF, (SHINGRIX) 50 mcg/0.5 mL injection; Inject 0.5 mLs into the muscle once. for 1 dose  Dispense: 0.5 mL; Refill: 1      Patient would prefer to get all vaccines at his pharmacy. Patient concerned about his meniere's disease and would like an opinion from the ENT at [facility]. Who does cochlear implants, needs a referral to [facility].     Provided Ad with a 5-10 year written screening schedule and personal prevention plan. Recommendations were developed using the USPSTF age appropriate recommendations. Education, counseling, and referrals were provided as needed. After Visit Summary printed and given to patient which includes a list of additional screenings\tests needed.    Follow up with Dr. Carbajal as scheduled.   Madelyn Whitney, APRN  I offered to discuss end of life issues, including information on how to make advance directives that the patient could use to name someone who would make medical decisions on their behalf if they became too ill to make themselves.    ___Patient declined  _X_Patient is interested, I provided paper work and offered to discuss.

## 2019-11-21 ENCOUNTER — TELEPHONE (OUTPATIENT)
Dept: FAMILY MEDICINE | Facility: CLINIC | Age: 64
End: 2019-11-21

## 2019-11-21 NOTE — TELEPHONE ENCOUNTER
Called patient and advised him that the [facility] was done by error and discontinued and he should have received two rx's for the shingrix and tdap. Patient advised that he did have the printed rx.

## 2019-11-21 NOTE — TELEPHONE ENCOUNTER
----- Message from Evelyn Cano sent at 11/21/2019 12:16 PM CST -----  Contact: Patient  Patient calling in regards to [facility] getting ahold of his shingles vaccine script? Said it was expensive and he wants it sent to [facility].     Please call patient as he is very confused and frustrated?  [phone]

## 2020-01-08 ENCOUNTER — HOSPITAL ENCOUNTER (OUTPATIENT)
Facility: HOSPITAL | Age: 65
Discharge: HOME | End: 2020-01-10
Attending: EMERGENCY MEDICINE | Admitting: INTERNAL MEDICINE
Payer: MEDICARE

## 2020-01-08 DIAGNOSIS — R10.9 RIGHT FLANK PAIN: ICD-10-CM

## 2020-01-08 DIAGNOSIS — K80.50 CHOLEDOCHOLITHIASIS: Primary | ICD-10-CM

## 2020-01-08 DIAGNOSIS — R00.1 BRADYCARDIA: ICD-10-CM

## 2020-01-08 PROBLEM — I10 ESSENTIAL HYPERTENSION: Status: ACTIVE | Noted: 2020-01-08

## 2020-01-08 LAB
ALBUMIN SERPL BCP-MCNC: 4.5 G/DL (ref 3.5–5.2)
ALP SERPL-CCNC: 77 U/L (ref 55–135)
ALT SERPL W/O P-5'-P-CCNC: 45 U/L (ref 10–44)
ANION GAP SERPL CALC-SCNC: 12 MMOL/L (ref 8–16)
AST SERPL-CCNC: 66 U/L (ref 10–40)
BASOPHILS # BLD AUTO: 0.03 K/UL (ref 0–0.2)
BASOPHILS NFR BLD: 0.3 % (ref 0–1.9)
BILIRUB SERPL-MCNC: 1.5 MG/DL (ref 0.1–1)
BILIRUB UR QL STRIP: NEGATIVE
BUN SERPL-MCNC: 16 MG/DL (ref 8–23)
CALCIUM SERPL-MCNC: 9.6 MG/DL (ref 8.7–10.5)
CHLORIDE SERPL-SCNC: 101 MMOL/L (ref 95–110)
CLARITY UR: CLEAR
CO2 SERPL-SCNC: 23 MMOL/L (ref 23–29)
COLOR UR: YELLOW
CREAT SERPL-MCNC: 0.9 MG/DL (ref 0.5–1.4)
DIFFERENTIAL METHOD: NORMAL
EOSINOPHIL # BLD AUTO: 0.1 K/UL (ref 0–0.5)
EOSINOPHIL NFR BLD: 1.4 % (ref 0–8)
ERYTHROCYTE [DISTWIDTH] IN BLOOD BY AUTOMATED COUNT: 13.4 % (ref 11.5–14.5)
EST. GFR  (AFRICAN AMERICAN): >60 ML/MIN/1.73 M^2
EST. GFR  (NON AFRICAN AMERICAN): >60 ML/MIN/1.73 M^2
GLUCOSE SERPL-MCNC: 124 MG/DL (ref 70–110)
GLUCOSE UR QL STRIP: NEGATIVE
HCT VFR BLD AUTO: 46.9 % (ref 40–54)
HGB BLD-MCNC: 16.2 G/DL (ref 14–18)
HGB UR QL STRIP: NEGATIVE
IMM GRANULOCYTES # BLD AUTO: 0.02 K/UL (ref 0–0.04)
KETONES UR QL STRIP: ABNORMAL
LEUKOCYTE ESTERASE UR QL STRIP: NEGATIVE
LIPASE SERPL-CCNC: 18 U/L (ref 4–60)
LYMPHOCYTES # BLD AUTO: 3.3 K/UL (ref 1–4.8)
LYMPHOCYTES NFR BLD: 34.3 % (ref 18–48)
MCH RBC QN AUTO: 29.9 PG (ref 27–31)
MCHC RBC AUTO-ENTMCNC: 34.5 G/DL (ref 32–36)
MCV RBC AUTO: 87 FL (ref 82–98)
MONOCYTES # BLD AUTO: 0.5 K/UL (ref 0.3–1)
MONOCYTES NFR BLD: 4.8 % (ref 4–15)
NEUTROPHILS # BLD AUTO: 5.7 K/UL (ref 1.8–7.7)
NEUTROPHILS NFR BLD: 59 % (ref 38–73)
NITRITE UR QL STRIP: NEGATIVE
NRBC BLD-RTO: 0 /100 WBC
PH UR STRIP: 6 [PH] (ref 5–8)
PLATELET # BLD AUTO: 256 K/UL (ref 150–350)
PMV BLD AUTO: 9.9 FL (ref 9.2–12.9)
POTASSIUM SERPL-SCNC: 3.9 MMOL/L (ref 3.5–5.1)
PROT SERPL-MCNC: 7.9 G/DL (ref 6–8.4)
PROT UR QL STRIP: NEGATIVE
RBC # BLD AUTO: 5.41 M/UL (ref 4.6–6.2)
SODIUM SERPL-SCNC: 136 MMOL/L (ref 136–145)
SP GR UR STRIP: >=1.03 (ref 1–1.03)
URN SPEC COLLECT METH UR: ABNORMAL
UROBILINOGEN UR STRIP-ACNC: NEGATIVE EU/DL
WBC # BLD AUTO: 9.71 K/UL (ref 3.9–12.7)

## 2020-01-08 PROCEDURE — 85025 COMPLETE CBC W/AUTO DIFF WBC: CPT

## 2020-01-08 PROCEDURE — 96375 TX/PRO/DX INJ NEW DRUG ADDON: CPT

## 2020-01-08 PROCEDURE — 81003 URINALYSIS AUTO W/O SCOPE: CPT

## 2020-01-08 PROCEDURE — G0378 HOSPITAL OBSERVATION PER HR: HCPCS

## 2020-01-08 PROCEDURE — 63600175 PHARM REV CODE 636 W HCPCS: Performed by: EMERGENCY MEDICINE

## 2020-01-08 PROCEDURE — 99215 PR OFFICE/OUTPT VISIT, EST, LEVL V, 40-54 MIN: ICD-10-PCS | Mod: ,,, | Performed by: INTERNAL MEDICINE

## 2020-01-08 PROCEDURE — 99215 OFFICE O/P EST HI 40 MIN: CPT | Mod: ,,, | Performed by: INTERNAL MEDICINE

## 2020-01-08 PROCEDURE — 80053 COMPREHEN METABOLIC PANEL: CPT

## 2020-01-08 PROCEDURE — 96374 THER/PROPH/DIAG INJ IV PUSH: CPT

## 2020-01-08 PROCEDURE — 36415 COLL VENOUS BLD VENIPUNCTURE: CPT

## 2020-01-08 PROCEDURE — 99285 EMERGENCY DEPT VISIT HI MDM: CPT | Mod: 25

## 2020-01-08 PROCEDURE — 83690 ASSAY OF LIPASE: CPT

## 2020-01-08 PROCEDURE — 63600175 PHARM REV CODE 636 W HCPCS: Performed by: NURSE PRACTITIONER

## 2020-01-08 RX ORDER — KETOROLAC TROMETHAMINE 30 MG/ML
15 INJECTION, SOLUTION INTRAMUSCULAR; INTRAVENOUS
Status: COMPLETED | OUTPATIENT
Start: 2020-01-08 | End: 2020-01-08

## 2020-01-08 RX ORDER — SODIUM,POTASSIUM PHOSPHATES 280-250MG
2 POWDER IN PACKET (EA) ORAL
Status: DISCONTINUED | OUTPATIENT
Start: 2020-01-08 | End: 2020-01-10 | Stop reason: HOSPADM

## 2020-01-08 RX ORDER — IBUPROFEN 200 MG
16 TABLET ORAL
Status: DISCONTINUED | OUTPATIENT
Start: 2020-01-08 | End: 2020-01-10 | Stop reason: HOSPADM

## 2020-01-08 RX ORDER — ONDANSETRON 2 MG/ML
8 INJECTION INTRAMUSCULAR; INTRAVENOUS EVERY 8 HOURS PRN
Status: DISCONTINUED | OUTPATIENT
Start: 2020-01-08 | End: 2020-01-10 | Stop reason: HOSPADM

## 2020-01-08 RX ORDER — GLUCAGON 1 MG
1 KIT INJECTION
Status: DISCONTINUED | OUTPATIENT
Start: 2020-01-08 | End: 2020-01-10 | Stop reason: HOSPADM

## 2020-01-08 RX ORDER — PANTOPRAZOLE SODIUM 40 MG/1
40 TABLET, DELAYED RELEASE ORAL DAILY
Status: DISCONTINUED | OUTPATIENT
Start: 2020-01-09 | End: 2020-01-10 | Stop reason: HOSPADM

## 2020-01-08 RX ORDER — IBUPROFEN 200 MG
24 TABLET ORAL
Status: DISCONTINUED | OUTPATIENT
Start: 2020-01-08 | End: 2020-01-10 | Stop reason: HOSPADM

## 2020-01-08 RX ORDER — LANOLIN ALCOHOL/MO/W.PET/CERES
800 CREAM (GRAM) TOPICAL
Status: DISCONTINUED | OUTPATIENT
Start: 2020-01-08 | End: 2020-01-10 | Stop reason: HOSPADM

## 2020-01-08 RX ORDER — MECLIZINE HYDROCHLORIDE 25 MG/1
25 TABLET ORAL 3 TIMES DAILY PRN
Status: DISCONTINUED | OUTPATIENT
Start: 2020-01-08 | End: 2020-01-10 | Stop reason: HOSPADM

## 2020-01-08 RX ORDER — SODIUM CHLORIDE 0.9 % (FLUSH) 0.9 %
10 SYRINGE (ML) INJECTION
Status: DISCONTINUED | OUTPATIENT
Start: 2020-01-08 | End: 2020-01-10 | Stop reason: HOSPADM

## 2020-01-08 RX ORDER — SODIUM CHLORIDE 9 MG/ML
INJECTION, SOLUTION INTRAVENOUS CONTINUOUS
Status: DISCONTINUED | OUTPATIENT
Start: 2020-01-09 | End: 2020-01-10

## 2020-01-08 RX ORDER — METOCLOPRAMIDE HYDROCHLORIDE 5 MG/ML
10 INJECTION INTRAMUSCULAR; INTRAVENOUS
Status: COMPLETED | OUTPATIENT
Start: 2020-01-08 | End: 2020-01-08

## 2020-01-08 RX ORDER — POTASSIUM CHLORIDE 20 MEQ/15ML
40 SOLUTION ORAL
Status: DISCONTINUED | OUTPATIENT
Start: 2020-01-08 | End: 2020-01-10 | Stop reason: HOSPADM

## 2020-01-08 RX ORDER — MORPHINE SULFATE 2 MG/ML
4 INJECTION, SOLUTION INTRAMUSCULAR; INTRAVENOUS EVERY 6 HOURS PRN
Status: DISCONTINUED | OUTPATIENT
Start: 2020-01-08 | End: 2020-01-10 | Stop reason: HOSPADM

## 2020-01-08 RX ORDER — TALC
6 POWDER (GRAM) TOPICAL NIGHTLY PRN
Status: DISCONTINUED | OUTPATIENT
Start: 2020-01-08 | End: 2020-01-10 | Stop reason: HOSPADM

## 2020-01-08 RX ADMIN — ONDANSETRON 8 MG: 2 INJECTION INTRAMUSCULAR; INTRAVENOUS at 08:01

## 2020-01-08 RX ADMIN — KETOROLAC TROMETHAMINE 15 MG: 30 INJECTION, SOLUTION INTRAMUSCULAR at 01:01

## 2020-01-08 RX ADMIN — METOCLOPRAMIDE 10 MG: 5 INJECTION, SOLUTION INTRAMUSCULAR; INTRAVENOUS at 01:01

## 2020-01-08 NOTE — SUBJECTIVE & OBJECTIVE
Past Medical History:   Diagnosis Date    Mills's esophagus     DDD (degenerative disc disease), cervical     Diarrhea 1/7/2019    Ear ringing     Gallbladder disease     Gastric ulcer with perforation     GERD (gastroesophageal reflux disease)     Hyperlipidemia     Intractable vomiting with nausea 1/7/2019    Kidney stone     Meniere disease 2017    Dr TAYLA Mohan    Vertigo        Past Surgical History:   Procedure Laterality Date    ADENOIDECTOMY      CHOLECYSTECTOMY      COLONOSCOPY N/A 12/27/2017    Procedure: COLONOSCOPY;  Surgeon: Adrian Castillo MD;  [facility];  Service: Endoscopy;  Laterality: N/A;    COLONOSCOPY W/ POLYPECTOMY  1/2013    Dr Hughes    ERCP  12/26/2016    [facility] note    ESOPHAGOGASTRODUODENOSCOPY  1/2013    Dr Hughes    TONSILLECTOMY      VASECTOMY         Review of patient's allergies indicates:   Allergen Reactions    Aspirin Other (See Comments)     Stomach ulcers       No current facility-administered medications on file prior to encounter.      Current Outpatient Medications on File Prior to Encounter   Medication Sig    azelastine (ASTELIN) 137 mcg (0.1 %) nasal spray 1 spray (137 mcg total) by Nasal route 2 (two) times daily. (Patient taking differently: 1 spray by Nasal route 2 (two) times daily as needed for Rhinitis. )    chlorhexidine (PERIDEX) 0.12 % solution RINSE WITH 1/2 OZ. ONCE DAILY FOR 5 DAYS    cholestyramine, with sugar, 4 gram Powd 3 scoops per day for diarrhea after gallbladder surgery    cyanocobalamin, vitamin B-12, (VITAMIN B-12) 1,000 mcg Subl Place 1 tablet under the tongue once daily.     fluticasone propionate (FLONASE) 50 mcg/actuation nasal spray 2 sprays (100 mcg total) by Each Nare route Daily. (Patient taking differently: 2 sprays by Each Nostril route daily as needed for Rhinitis. )    LOPERAMIDE HCL (IMODIUM A-D ORAL) Take 2 tablets by mouth daily as needed (diarrhea).     meclizine (ANTIVERT) 25 mg tablet  Take 1 tablet (25 mg total) by mouth 3 (three) times daily as needed for Dizziness.    omeprazole (PRILOSEC) 40 MG capsule Take 1 capsule (40 mg total) by mouth 2 (two) times daily before meals.    ondansetron (ZOFRAN-ODT) 8 MG TbDL Take 1 tablet (8 mg total) by mouth every 8 (eight) hours. (Patient taking differently: Take 8 mg by mouth every 8 (eight) hours as needed (nausea). )    sucralfate (CARAFATE) 1 gram tablet Take 1 tablet (1 g total) by mouth 4 (four) times daily as needed. Dissolve pill in 2 tablespoons water and drink (Patient taking differently: Take 1 g by mouth 4 (four) times daily before meals and nightly. Dissolve pill in 2 tablespoons water and drink)    triamterene-hydrochlorothiazide 37.5-25 mg (DYAZIDE) 37.5-25 mg per capsule 1/2 to 1 tablet every day during Meniere's episode    [DISCONTINUED] GI cocktail (mylanta 30 mL, lidocaine 2 % viscous 10 mL, dicyclomine 10 mL) 50 mL Take 10 mLs by mouth 3 (three) times daily as needed. (Patient taking differently: Take 10 mLs by mouth 3 (three) times daily as needed (ulcers). )     Family History     Problem Relation (Age of Onset)    Cancer Brother, Sister    Heart disease Father, Mother    Hyperlipidemia Mother    No Known Problems Sister, Daughter, Son, Daughter, Son        Tobacco Use    Smoking status: Former Smoker     Packs/day: 1.00     Start date:      Last attempt to quit:      Years since quittin.0    Smokeless tobacco: Never Used   Substance and Sexual Activity    Alcohol use: No    Drug use: Never    Sexual activity: Yes     Partners: Female     Review of Systems   Constitutional: Positive for appetite change and fatigue. Negative for chills and diaphoresis.   HENT: Positive for hearing loss. Negative for congestion and sore throat.    Eyes: Negative for pain and itching.   Respiratory: Negative for cough, shortness of breath and wheezing.    Cardiovascular: Negative for chest pain, palpitations and leg swelling.    Gastrointestinal: Positive for abdominal pain and nausea. Negative for blood in stool and constipation.   Endocrine: Negative for polyphagia and polyuria.   Genitourinary: Negative for dysuria, flank pain and hematuria.   Musculoskeletal: Negative for arthralgias, back pain and myalgias.   Skin: Negative for color change and pallor.   Neurological: Negative for dizziness, syncope and light-headedness.   Hematological: Negative for adenopathy.   Psychiatric/Behavioral: Negative for agitation and confusion. The patient is not nervous/anxious.      Objective:     Vital Signs (Most Recent):  Temp: 97.7 °F (36.5 °C) (01/08/20 1329)  Pulse: 88 (01/08/20 1727)  Resp: 16 (01/08/20 1328)  BP: 112/68 (01/08/20 1701)  SpO2: 98 % (01/08/20 1727) Vital Signs (24h Range):  Temp:  [97.7 °F (36.5 °C)] 97.7 °F (36.5 °C)  Pulse:  [63-94] 88  Resp:  [16] 16  SpO2:  [89 %-98 %] 98 %  BP: (108-145)/(68-90) 112/68        There is no height or weight on file to calculate BMI.    Physical Exam   Constitutional: He is oriented to person, place, and time. He appears well-developed and well-nourished.   HENT:   Head: Normocephalic and atraumatic.   Right Ear: External ear normal.   Left Ear: External ear normal.   Nose: Nose normal.   Mouth/Throat: Oropharynx is clear and moist.   [place]   Eyes: Pupils are equal, round, and reactive to light. Conjunctivae and EOM are normal.   Neck: Normal range of motion. Neck supple. No JVD present.   Cardiovascular: Normal rate, regular rhythm, normal heart sounds and intact distal pulses.   No murmur heard.  Pulmonary/Chest: Effort normal and breath sounds normal. He has no wheezes.   Abdominal: Soft. Bowel sounds are normal. There is tenderness. There is guarding.   RUQ tenderness   Musculoskeletal: Normal range of motion.   Neurological: He is alert and oriented to person, place, and time. Coordination normal.   Skin: Skin is warm and dry. Capillary refill takes 2 to 3 seconds.   Psychiatric: He has a  normal mood and affect. His behavior is normal.   Nursing note and vitals reviewed.        CRANIAL NERVES     CN III, IV, VI   Pupils are equal, round, and reactive to light.  Extraocular motions are normal.        Significant Labs:   BMP:   Recent Labs   Lab 01/08/20  1350   *      K 3.9      CO2 23   BUN 16   CREATININE 0.9   CALCIUM 9.6     CBC:   Recent Labs   Lab 01/08/20  1350   WBC 9.71   HGB 16.2   HCT 46.9          Significant Imaging: I have reviewed and interpreted all pertinent imaging results/findings within the past 24 hours.

## 2020-01-08 NOTE — HPI
"Ad Lyons is a 64 y.o. male with Meniere's disease and drake's esophagus who presents to the ED for evaluation of acute onset of right upper abdominal pain  3 days ago associated with nausea and vomiting which he attributes to chronic vertigo. He states RUQ pain was worse this am prompting medical attention. He states pain was 8/10 "like punched in stomach". He did not take anything for pain relief and nothing alleviated pain. He reports loose stools but no diarrhea. Denies blood in stool.     In the ED he underwent CT abdomen renal stone protocol which showed Small amounts of air within the biliary tree possibly due to prior sphincteroplasty. A MRCP was obtained and showed Prior cholecystectomy.  A 9 mm stone is identified in the distal common bile duct with common bile duct dilation to 1.5 cm.  Smaller filling defects higher in the common duct may represent debris or smaller stones. GI notified on results and plan for ERCP with dilation tomorrow.   "

## 2020-01-08 NOTE — CONSULTS
[facility]     CC: Abdominal pain    HPI 64 y.o. male with prior CCY and choledocholithiasis requiring ERCP with sphincterotomy and stone removal in , presents with several days of acute, intermittent, RUQ abdominal pain associated nausea and loose stools. He denies fevers but has had chills. He also complains of vertigo related to his Meniere's disease.    Past Medical History:   Diagnosis Date    Mills's esophagus     DDD (degenerative disc disease), cervical     Diarrhea 2019    Ear ringing     Gallbladder disease     Gastric ulcer with perforation     GERD (gastroesophageal reflux disease)     Hyperlipidemia     Intractable vomiting with nausea 2019    Kidney stone     Meniere disease     Dr TAYLA Mohan    Vertigo        Past Surgical History:   Procedure Laterality Date    ADENOIDECTOMY      CHOLECYSTECTOMY      COLONOSCOPY N/A 2017    Procedure: COLONOSCOPY;  Surgeon: Adrian Castillo MD;  Location: [facility];  Service: Endoscopy;  Laterality: N/A;    COLONOSCOPY W/ POLYPECTOMY  2013    Dr Hughes    ERCP  2016    [facility]    ESOPHAGOGASTRODUODENOSCOPY  2013    Dr Hughes    TONSILLECTOMY      VASECTOMY         Social History     Tobacco Use    Smoking status: Former Smoker     Packs/day: 1.00     Start date:      Last attempt to quit:      Years since quittin.0    Smokeless tobacco: Never Used   Substance Use Topics    Alcohol use: No    Drug use: Never       Family History   Problem Relation Age of Onset    Heart disease Father     No Known Problems Mother     No Known Problems Sister     Cancer Brother         possible colon cancer    No Known Problems Daughter     No Known Problems Son     Cancer Sister         breast cancer    No Known Problems Daughter     No Known Problems Son        Review of Systems  General ROS: negative for -  fever or weight loss; + chills  Psychological ROS: negative for -  hallucination, depression or suicidal ideation  Ophthalmic ROS: negative for - blurry vision, photophobia or eye pain  ENT ROS: negative for - epistaxis, sore throat or rhinorrhea; + vertigo  Respiratory ROS: no cough, shortness of breath, or wheezing  Cardiovascular ROS: no chest pain or dyspnea on exertion  Gastrointestinal ROS: + abdominal pain, + nausea, + loose stools  Genito-Urinary ROS: no dysuria, trouble voiding, or hematuria  Musculoskeletal ROS: negative for - arthralgia, myalgia, weakness  Neurological ROS: no syncope or seizures; no ataxia  Dermatological ROS: negative for pruritis, rash and jaundice    Physical Examination  /69   Pulse 74   Temp 97.7 °F (36.5 °C)   Resp 16   SpO2 (!) 94%   General appearance: alert, cooperative, no distress  HENT: Normocephalic, atraumatic, neck symmetrical, no nasal discharge   Eyes: conjunctivae/corneas clear, PERRL, EOM's intact, sclera anicteric  Lungs: clear to auscultation bilaterally, no dullness to percussion bilaterally, symmetric expansion, breathing unlabored  Heart: regular rate and rhythm without rub; no displacement of the PMI   Abdomen: soft, mild ttp in RUQ without rebound or guarding, BS active, no masses appreciated   Extremities: extremities symmetric; no clubbing, cyanosis, or edema  Integument: Skin color, texture, turgor normal; no rashes; hair distrubution normal, no jaundice  Neurologic: Alert and oriented X 3, no focal sensory or motor neurologic deficits  Psychiatric: no pressured speech; normal affect; no evidence of impaired cognition, no anxiety/depression     Labs:  Lab Results   Component Value Date    WBC 9.71 01/08/2020    HGB 16.2 01/08/2020    HCT 46.9 01/08/2020    MCV 87 01/08/2020     01/08/2020       CMP  Sodium   Date Value Ref Range Status   01/08/2020 136 136 - 145 mmol/L Final     Potassium   Date Value Ref Range Status   01/08/2020 3.9 3.5 - 5.1 mmol/L Final     Chloride   Date Value Ref Range Status    01/08/2020 101 95 - 110 mmol/L Final     CO2   Date Value Ref Range Status   01/08/2020 23 23 - 29 mmol/L Final     Glucose   Date Value Ref Range Status   01/08/2020 124 (H) 70 - 110 mg/dL Final     BUN, Bld   Date Value Ref Range Status   01/08/2020 16 8 - 23 mg/dL Final     Creatinine   Date Value Ref Range Status   01/08/2020 0.9 0.5 - 1.4 mg/dL Final     Calcium   Date Value Ref Range Status   01/08/2020 9.6 8.7 - 10.5 mg/dL Final     Total Protein   Date Value Ref Range Status   01/08/2020 7.9 6.0 - 8.4 g/dL Final     Albumin   Date Value Ref Range Status   01/08/2020 4.5 3.5 - 5.2 g/dL Final     Total Bilirubin   Date Value Ref Range Status   01/08/2020 1.5 (H) 0.1 - 1.0 mg/dL Final     Comment:     For infants and newborns, interpretation of results should be based  on gestational age, weight and in agreement with clinical  observations.  Premature Infant recommended reference ranges:  Up to 24 hours.............<8.0 mg/dL  Up to 48 hours............<12.0 mg/dL  3-5 days..................<15.0 mg/dL  6-29 days.................<15.0 mg/dL       Alkaline Phosphatase   Date Value Ref Range Status   01/08/2020 77 55 - 135 U/L Final     AST   Date Value Ref Range Status   01/08/2020 66 (H) 10 - 40 U/L Final     ALT   Date Value Ref Range Status   01/08/2020 45 (H) 10 - 44 U/L Final     Anion Gap   Date Value Ref Range Status   01/08/2020 12 8 - 16 mmol/L Final     eGFR if    Date Value Ref Range Status   01/08/2020 >60 >60 mL/min/1.73 m^2 Final     eGFR if non    Date Value Ref Range Status   01/08/2020 >60 >60 mL/min/1.73 m^2 Final     Comment:     Calculation used to obtain the estimated glomerular filtration  rate (eGFR) is the CKD-EPI equation.            Imaging:  MRCP was independently visualized and reviewed by me and showed 9 mm CBD stone, smaller filling defects higher in CBD    Assessment:   64 y.o. male with prior CCY and choledocholithiasis requiring ERCP with  sphincterotomy and stone removal in 2016, presents with acute symptomatic choledocholithiasis associated with elevation in bilirubin and LFTs. He is hemodynamically stable    Plan:  -Would place on Zosyn  -CBC, CMP , PT/INR in AM  -Ok for clear liquids today, NPO at midnight  -ERCP tomorrow with Dr. Castillo  -Supportive care with pain medication, IVFs and anti-emetics    Rachelle Chaparro MD  [facility]  [address], [address]  [place] [address]  Office: [phone]  Fax: [phone]

## 2020-01-08 NOTE — ED PROVIDER NOTES
Encounter Date: 1/8/2020    SCRIBE #1 NOTE: I, Herlinda Small, am scribing for, and in the presence of, Vic Cook III, MD.       History     Chief Complaint   Patient presents with    Abdominal Pain     Presents with RUQ abdominal pain with nasuea and vomiting x 2 days       Time seen by provider: 1:39 PM on 01/08/2020    Ad Lyons is a 64 y.o. male with Meniere's disease who presents to the ED with an onset of right upper abdominal pain. He reports having nausea and vomiting for the past 3 days secondary to vertigo but has no vomited today. The patient takes Meclizine but did not take it today. Today, he woke up today with abdominal pain that worsened 1 hour ago. The patient states he has not been urinating much lately. Currently, he endorses nausea. The patient reports loose stools but no diarrhea and has not noticed bloody or dark/tarry stool. He denies vomiting, diarrhea, pain with urination, or any other symptoms at this time. The patient has a PSHx including a cholecystectomy and a colonoscopy (12/27/2017).     The history is provided by the spouse and the patient (wife).     Review of patient's allergies indicates:   Allergen Reactions    Aspirin Other (See Comments)     Stomach ulcers     Past Medical History:   Diagnosis Date    Mills's esophagus     DDD (degenerative disc disease), cervical     Diarrhea 1/7/2019    Ear ringing     Gallbladder disease     Gastric ulcer with perforation     GERD (gastroesophageal reflux disease)     Hyperlipidemia     Intractable vomiting with nausea 1/7/2019    Kidney stone     Meniere disease 2017    Dr TAYLA Mohan    Vertigo      Past Surgical History:   Procedure Laterality Date    ADENOIDECTOMY      CHOLECYSTECTOMY      COLONOSCOPY N/A 12/27/2017    Procedure: COLONOSCOPY;  Surgeon: Adrian Castillo MD;  [facility];  Service: Endoscopy;  Laterality: N/A;    COLONOSCOPY W/ POLYPECTOMY  1/2013    Dr Hughes    ERCP  12/26/2016     [facility]    ESOPHAGOGASTRODUODENOSCOPY  2013    Dr Hughes    TONSILLECTOMY      VASECTOMY       Family History   Problem Relation Age of Onset    Heart disease Father     No Known Problems Mother     No Known Problems Sister     Cancer Brother         possible colon cancer    No Known Problems Daughter     No Known Problems Son     Cancer Sister         breast cancer    No Known Problems Daughter     No Known Problems Son      Social History     Tobacco Use    Smoking status: Former Smoker     Packs/day: 1.00     Start date:      Last attempt to quit:      Years since quittin.0    Smokeless tobacco: Never Used   Substance Use Topics    Alcohol use: No    Drug use: Never     Review of Systems   Constitutional: Negative for activity change, appetite change, chills, fatigue and fever.   Eyes: Negative for visual disturbance.   Respiratory: Negative for apnea and shortness of breath.    Cardiovascular: Negative for chest pain and palpitations.   Gastrointestinal: Positive for abdominal pain, nausea and vomiting (resolved). Negative for abdominal distention and diarrhea.   Genitourinary: Positive for decreased urine volume. Negative for difficulty urinating and dysuria.   Musculoskeletal: Negative for neck pain.   Skin: Negative for pallor and rash.   Neurological: Positive for dizziness. Negative for headaches.   Hematological: Does not bruise/bleed easily.   Psychiatric/Behavioral: Negative for agitation.     Physical Exam     Initial Vitals   BP Pulse Resp Temp SpO2   20 1328 20 1328 20 1328 20 1329 20 1328   127/68 68 16 97.7 °F (36.5 °C) 97 %      MAP       --                Physical Exam    Nursing note and vitals reviewed.  Constitutional: He appears well-developed and well-nourished.   [place].    HENT:   Head: Normocephalic and atraumatic.   Eyes: Conjunctivae are normal.   Neck: Normal range of motion. Neck supple.   Cardiovascular: Normal rate  and regular rhythm. Exam reveals no gallop and no friction rub.    Murmur heard.   Systolic murmur is present with a grade of 1/6.  Pulmonary/Chest: Breath sounds normal. No respiratory distress. He has no wheezes. He has no rhonchi. He has no rales.   Abdominal: Soft. He exhibits no distension. There is no tenderness. There is no CVA tenderness.   Soft, non-tender abdomen.    Musculoskeletal: Normal range of motion.   Neurological: He is alert and oriented to person, place, and time.   Skin: Skin is warm and dry.   Psychiatric: He has a normal mood and affect.       ED Course   Procedures  Labs Reviewed   COMPREHENSIVE METABOLIC PANEL - Abnormal; Notable for the following components:       Result Value    Glucose 124 (*)     Total Bilirubin 1.5 (*)     AST 66 (*)     ALT 45 (*)     All other components within normal limits   URINALYSIS, REFLEX TO URINE CULTURE - Abnormal; Notable for the following components:    Specific Gravity, UA >=1.030 (*)     Ketones, UA 1+ (*)     All other components within normal limits    Narrative:     Preferred Collection Type->Urine, Clean Catch   CBC W/ AUTO DIFFERENTIAL   LIPASE        Imaging Results          MRI MRCP Without Contrast (Final result)  Result time 01/08/20 16:40:59    Final result by Say York Jr., MD (01/08/20 16:40:59)                 Impression:      Prior cholecystectomy.  A 9 mm stone is identified in the distal common bile duct with common bile duct dilation to 1.5 cm.  Smaller filling defects higher in the common duct may represent debris or smaller stones.  Single small cyst of the right kidney.      Electronically signed by: Say York MD  Date:    01/08/2020  Time:    16:40             Narrative:    EXAMINATION:  MRI ABDOMEN WITHOUT CONTRAST MRCP    CLINICAL HISTORY:  Jaundice, abd pain, fever or biliary surgery or gallstones;    TECHNIQUE:  Multiplanar, multisequence images are performed through the liver and upper abdomen.  Additionally 2D  and 3D MRCP sequences are performed as well as MIP images.    COMPARISON:  CT scan of January 8, 2020.    FINDINGS:  The liver is of normal size and MR signal.  A focal mass of the liver parenchyma is not seen.  The gallbladder is absent.  There is dilation of the common bile duct tapering down to the pancreas.  It measures 1.5 cm at its widest point.  In the distal duct however is a 9 mm oval filling defect consistent with a stone.  This is best seen on ERCP images  of sequence 700 and 750.  There are smaller filling defects identified in the common bile duct higher at the liver hilum which could represent debris or smaller stones as well.  Intrahepatic biliary dilatation however is not identified.    The pancreatic duct appears of normal caliber.  The pancreas is of normal contour and MR signal intensity without evidence of focal mass.  No edema is seen.  The spleen is of normal size and MR signal.  The kidneys are of normal size and MR signal.  There is a small lateral surface cyst of the lower pole of the right kidney measuring 8 mm.                               CT Renal Stone Study ABD Pelvis WO (Final result)  Result time 01/08/20 14:46:18    Final result by Say York Jr., MD (01/08/20 14:46:18)                 Impression:      Small amounts of air within the biliary tree possibly due to prior sphincteroplasty.  Prior cholecystectomy.  Prostate hypertrophy.  No renal or ureteral stones or hydronephrosis is noted.      Electronically signed by: Say York MD  Date:    01/08/2020  Time:    14:46             Narrative:    EXAMINATION:  CT RENAL STONE STUDY ABD PELVIS WO    CLINICAL HISTORY:  Flank pain, stone disease suspected; Unspecified abdominal pain    TECHNIQUE:  Low dose axial images, sagittal and coronal reformations were obtained from the lung bases to the pubic symphysis.  Contrast was not administered.    COMPARISON:  CT abdomen of December 25, 2016.    FINDINGS:  The liver is of normal  size contour and CT density without a focal liver mass seen.  There are small air bubbles however noted in the biliary tree and common bile duct.  Has this patient had a sphincteroplasty?.  The gallbladder is absent with surgical clips noted.  The pancreas is of normal contour and CT density without edema or mass.  The spleen is small and of normal CT density.    The adrenal glands are not enlarged.  The kidneys are of normal size contour and CT density for a noncontrast study.  The ureters follow a normal course down to the bladder without dilation or stone.  The abdominal aorta and inferior vena cava are of normal caliber.  Retroperitoneal adenopathy or mass is not seen.    The stomach appears of normal configuration.  Small bowel dilatation or air-fluid levels are not seen.  The colon appears of normal caliber and course.  A normal appendix is identified.  No free fluid is seen.    Within the pelvis the bladder is of normal contour without mass or asymmetry.  The prostate is enlarged measuring 6.6 cm transversely without asymmetry.  No free fluid is seen.                                 Medical Decision Making:   History:   Old Medical Records: I decided to obtain old medical records.  Clinical Tests:   Lab Tests: Reviewed and Ordered  Radiological Study: Ordered and Reviewed  ED Management:  64-year-old male status post cholecystectomy with a history of choledocholithiasis presents with abrupt onset of right upper quadrant pain.  MRCP reveals A 9 mm stone is identified in the distal common bile duct with common bile duct dilation to 1.5 cm.  I discussed the case with Dr. Chaparro who recommends admission with planned ERCP in the morning.  Discussed with Dr. Keith who will admit the patient.       APC / Resident Notes:   I, Dr. Vic Cook III, personally performed the services described in this documentation. All medical record entries made by the scribe were at my direction and in my presence.  I have reviewed  the chart and agree that the record reflects my personal performance and is accurate and complete       Scribe Attestation:   Scribe #1: I performed the above scribed service and the documentation accurately describes the services I performed. I attest to the accuracy of the note.                  Clinical Impression:       ICD-10-CM ICD-9-CM   1. Choledocholithiasis K80.50 574.50   2. Right flank pain R10.9 789.09         Disposition:   Disposition: Admitted                     Vic Cook III, MD  01/08/20 3946

## 2020-01-09 ENCOUNTER — ANESTHESIA (OUTPATIENT)
Dept: ENDOSCOPY | Facility: HOSPITAL | Age: 65
End: 2020-01-09
Payer: MEDICARE

## 2020-01-09 ENCOUNTER — ANESTHESIA EVENT (OUTPATIENT)
Dept: ENDOSCOPY | Facility: HOSPITAL | Age: 65
End: 2020-01-09
Payer: MEDICARE

## 2020-01-09 LAB
ANION GAP SERPL CALC-SCNC: 12 MMOL/L (ref 8–16)
BASOPHILS # BLD AUTO: 0.02 K/UL (ref 0–0.2)
BASOPHILS NFR BLD: 0.2 % (ref 0–1.9)
BILIRUB UR QL STRIP: NEGATIVE
BUN SERPL-MCNC: 16 MG/DL (ref 8–23)
CALCIUM SERPL-MCNC: 8.8 MG/DL (ref 8.7–10.5)
CHLORIDE SERPL-SCNC: 102 MMOL/L (ref 95–110)
CLARITY UR: CLEAR
CO2 SERPL-SCNC: 21 MMOL/L (ref 23–29)
COLOR UR: YELLOW
CREAT SERPL-MCNC: 1 MG/DL (ref 0.5–1.4)
DIFFERENTIAL METHOD: ABNORMAL
EOSINOPHIL # BLD AUTO: 0 K/UL (ref 0–0.5)
EOSINOPHIL NFR BLD: 0.1 % (ref 0–8)
ERYTHROCYTE [DISTWIDTH] IN BLOOD BY AUTOMATED COUNT: 13.6 % (ref 11.5–14.5)
EST. GFR  (AFRICAN AMERICAN): >60 ML/MIN/1.73 M^2
EST. GFR  (NON AFRICAN AMERICAN): >60 ML/MIN/1.73 M^2
GLUCOSE SERPL-MCNC: 107 MG/DL (ref 70–110)
GLUCOSE UR QL STRIP: NEGATIVE
HCT VFR BLD AUTO: 44.5 % (ref 40–54)
HGB BLD-MCNC: 15 G/DL (ref 14–18)
HGB UR QL STRIP: NEGATIVE
IMM GRANULOCYTES # BLD AUTO: 0.05 K/UL (ref 0–0.04)
KETONES UR QL STRIP: NEGATIVE
LEUKOCYTE ESTERASE UR QL STRIP: NEGATIVE
LYMPHOCYTES # BLD AUTO: 0.7 K/UL (ref 1–4.8)
LYMPHOCYTES NFR BLD: 5.7 % (ref 18–48)
MCH RBC QN AUTO: 29.7 PG (ref 27–31)
MCHC RBC AUTO-ENTMCNC: 33.7 G/DL (ref 32–36)
MCV RBC AUTO: 88 FL (ref 82–98)
MONOCYTES # BLD AUTO: 0.4 K/UL (ref 0.3–1)
MONOCYTES NFR BLD: 3.6 % (ref 4–15)
NEUTROPHILS # BLD AUTO: 10.2 K/UL (ref 1.8–7.7)
NEUTROPHILS NFR BLD: 90 % (ref 38–73)
NITRITE UR QL STRIP: NEGATIVE
NRBC BLD-RTO: 0 /100 WBC
PH UR STRIP: 6 [PH] (ref 5–8)
PLATELET # BLD AUTO: 223 K/UL (ref 150–350)
PMV BLD AUTO: 9.3 FL (ref 9.2–12.9)
POTASSIUM SERPL-SCNC: 3.8 MMOL/L (ref 3.5–5.1)
PROT UR QL STRIP: NEGATIVE
RBC # BLD AUTO: 5.05 M/UL (ref 4.6–6.2)
SODIUM SERPL-SCNC: 135 MMOL/L (ref 136–145)
SP GR UR STRIP: 1.01 (ref 1–1.03)
URN SPEC COLLECT METH UR: NORMAL
UROBILINOGEN UR STRIP-ACNC: 1 EU/DL
WBC # BLD AUTO: 11.35 K/UL (ref 3.9–12.7)

## 2020-01-09 PROCEDURE — 43264 ERCP REMOVE DUCT CALCULI: CPT | Mod: 22,51,, | Performed by: INTERNAL MEDICINE

## 2020-01-09 PROCEDURE — 63600175 PHARM REV CODE 636 W HCPCS: Performed by: NURSE PRACTITIONER

## 2020-01-09 PROCEDURE — 74328 X-RAY BILE DUCT ENDOSCOPY: CPT | Mod: 26,,, | Performed by: INTERNAL MEDICINE

## 2020-01-09 PROCEDURE — 25000003 PHARM REV CODE 250: Performed by: NURSE ANESTHETIST, CERTIFIED REGISTERED

## 2020-01-09 PROCEDURE — 43264 PR ERCP,W/REMOVAL STONE,BIL/PANCR DUCTS: ICD-10-PCS | Mod: 22,51,, | Performed by: INTERNAL MEDICINE

## 2020-01-09 PROCEDURE — D9220A PRA ANESTHESIA: ICD-10-PCS | Mod: CRNA,,, | Performed by: NURSE ANESTHETIST, CERTIFIED REGISTERED

## 2020-01-09 PROCEDURE — 43274 ERCP DUCT STENT PLACEMENT: CPT | Mod: 22,,, | Performed by: INTERNAL MEDICINE

## 2020-01-09 PROCEDURE — 25000003 PHARM REV CODE 250: Performed by: INTERNAL MEDICINE

## 2020-01-09 PROCEDURE — G0378 HOSPITAL OBSERVATION PER HR: HCPCS

## 2020-01-09 PROCEDURE — 43274 PR ERCP W/STENT PLCMNT BILIARY/PANCREATIC DUCT: ICD-10-PCS | Mod: 22,,, | Performed by: INTERNAL MEDICINE

## 2020-01-09 PROCEDURE — 74328 PR  X-RAY FOR BILE DUCT ENDOSCOPY: ICD-10-PCS | Mod: 26,,, | Performed by: INTERNAL MEDICINE

## 2020-01-09 PROCEDURE — 25500020 PHARM REV CODE 255: Performed by: INTERNAL MEDICINE

## 2020-01-09 PROCEDURE — 96375 TX/PRO/DX INJ NEW DRUG ADDON: CPT

## 2020-01-09 PROCEDURE — D9220A PRA ANESTHESIA: Mod: ANES,,, | Performed by: ANESTHESIOLOGY

## 2020-01-09 PROCEDURE — 36415 COLL VENOUS BLD VENIPUNCTURE: CPT

## 2020-01-09 PROCEDURE — C2625 STENT, NON-COR, TEM W/DEL SY: HCPCS | Performed by: INTERNAL MEDICINE

## 2020-01-09 PROCEDURE — 37000009 HC ANESTHESIA EA ADD 15 MINS: Performed by: INTERNAL MEDICINE

## 2020-01-09 PROCEDURE — 63600175 PHARM REV CODE 636 W HCPCS: Performed by: INTERNAL MEDICINE

## 2020-01-09 PROCEDURE — D9220A PRA ANESTHESIA: ICD-10-PCS | Mod: ANES,,, | Performed by: ANESTHESIOLOGY

## 2020-01-09 PROCEDURE — 43264 ERCP REMOVE DUCT CALCULI: CPT | Performed by: INTERNAL MEDICINE

## 2020-01-09 PROCEDURE — 43274 ERCP DUCT STENT PLACEMENT: CPT | Performed by: INTERNAL MEDICINE

## 2020-01-09 PROCEDURE — 43262 ENDO CHOLANGIOPANCREATOGRAPH: CPT | Performed by: INTERNAL MEDICINE

## 2020-01-09 PROCEDURE — 27202125 HC BALLOON, EXTRACTION (ANY): Performed by: INTERNAL MEDICINE

## 2020-01-09 PROCEDURE — 85025 COMPLETE CBC W/AUTO DIFF WBC: CPT

## 2020-01-09 PROCEDURE — 81003 URINALYSIS AUTO W/O SCOPE: CPT

## 2020-01-09 PROCEDURE — 80048 BASIC METABOLIC PNL TOTAL CA: CPT

## 2020-01-09 PROCEDURE — 27201674 HC SPHINCTERTOME: Performed by: INTERNAL MEDICINE

## 2020-01-09 PROCEDURE — 63600175 PHARM REV CODE 636 W HCPCS: Performed by: NURSE ANESTHETIST, CERTIFIED REGISTERED

## 2020-01-09 PROCEDURE — 94761 N-INVAS EAR/PLS OXIMETRY MLT: CPT

## 2020-01-09 PROCEDURE — 96361 HYDRATE IV INFUSION ADD-ON: CPT | Mod: 59

## 2020-01-09 PROCEDURE — D9220A PRA ANESTHESIA: Mod: CRNA,,, | Performed by: NURSE ANESTHETIST, CERTIFIED REGISTERED

## 2020-01-09 PROCEDURE — 37000008 HC ANESTHESIA 1ST 15 MINUTES: Performed by: INTERNAL MEDICINE

## 2020-01-09 RX ORDER — PROPOFOL 10 MG/ML
VIAL (ML) INTRAVENOUS
Status: DISCONTINUED | OUTPATIENT
Start: 2020-01-09 | End: 2020-01-09

## 2020-01-09 RX ORDER — INDOMETHACIN 50 MG/1
SUPPOSITORY RECTAL
Status: COMPLETED | OUTPATIENT
Start: 2020-01-09 | End: 2020-01-09

## 2020-01-09 RX ORDER — ONDANSETRON HYDROCHLORIDE 2 MG/ML
INJECTION, SOLUTION INTRAMUSCULAR; INTRAVENOUS
Status: DISCONTINUED | OUTPATIENT
Start: 2020-01-09 | End: 2020-01-09

## 2020-01-09 RX ORDER — ROCURONIUM BROMIDE 10 MG/ML
INJECTION, SOLUTION INTRAVENOUS
Status: DISCONTINUED | OUTPATIENT
Start: 2020-01-09 | End: 2020-01-09

## 2020-01-09 RX ORDER — SODIUM CHLORIDE 9 MG/ML
INJECTION, SOLUTION INTRAVENOUS CONTINUOUS
Status: DISCONTINUED | OUTPATIENT
Start: 2020-01-09 | End: 2020-01-10

## 2020-01-09 RX ORDER — MIDAZOLAM HYDROCHLORIDE 1 MG/ML
INJECTION, SOLUTION INTRAMUSCULAR; INTRAVENOUS
Status: DISCONTINUED | OUTPATIENT
Start: 2020-01-09 | End: 2020-01-09

## 2020-01-09 RX ORDER — DEXAMETHASONE SODIUM PHOSPHATE 4 MG/ML
INJECTION, SOLUTION INTRA-ARTICULAR; INTRALESIONAL; INTRAMUSCULAR; INTRAVENOUS; SOFT TISSUE
Status: DISCONTINUED | OUTPATIENT
Start: 2020-01-09 | End: 2020-01-09

## 2020-01-09 RX ORDER — SUCCINYLCHOLINE CHLORIDE 20 MG/ML
INJECTION INTRAMUSCULAR; INTRAVENOUS
Status: DISCONTINUED | OUTPATIENT
Start: 2020-01-09 | End: 2020-01-09

## 2020-01-09 RX ORDER — ONDANSETRON 2 MG/ML
4 INJECTION INTRAMUSCULAR; INTRAVENOUS DAILY PRN
Status: DISCONTINUED | OUTPATIENT
Start: 2020-01-09 | End: 2020-01-10 | Stop reason: HOSPADM

## 2020-01-09 RX ORDER — INDOMETHACIN 50 MG/1
50 SUPPOSITORY RECTAL EVERY 12 HOURS PRN
Status: DISCONTINUED | OUTPATIENT
Start: 2020-01-09 | End: 2020-01-10 | Stop reason: HOSPADM

## 2020-01-09 RX ORDER — SODIUM CHLORIDE 0.9 % (FLUSH) 0.9 %
3 SYRINGE (ML) INJECTION
Status: DISCONTINUED | OUTPATIENT
Start: 2020-01-09 | End: 2020-01-10 | Stop reason: HOSPADM

## 2020-01-09 RX ORDER — GLYCOPYRROLATE 0.2 MG/ML
INJECTION INTRAMUSCULAR; INTRAVENOUS
Status: DISCONTINUED | OUTPATIENT
Start: 2020-01-09 | End: 2020-01-09

## 2020-01-09 RX ORDER — GLUCAGON 1 MG
KIT INJECTION
Status: DISCONTINUED
Start: 2020-01-09 | End: 2020-01-09 | Stop reason: WASHOUT

## 2020-01-09 RX ORDER — EPINEPHRINE 0.1 MG/ML
INJECTION INTRAVENOUS
Status: DISCONTINUED
Start: 2020-01-09 | End: 2020-01-09 | Stop reason: WASHOUT

## 2020-01-09 RX ORDER — FENTANYL CITRATE 50 UG/ML
INJECTION, SOLUTION INTRAMUSCULAR; INTRAVENOUS
Status: DISCONTINUED | OUTPATIENT
Start: 2020-01-09 | End: 2020-01-09

## 2020-01-09 RX ORDER — FENTANYL CITRATE 50 UG/ML
25 INJECTION, SOLUTION INTRAMUSCULAR; INTRAVENOUS EVERY 5 MIN PRN
Status: DISCONTINUED | OUTPATIENT
Start: 2020-01-09 | End: 2020-01-10 | Stop reason: HOSPADM

## 2020-01-09 RX ORDER — LIDOCAINE HCL/PF 100 MG/5ML
SYRINGE (ML) INTRAVENOUS
Status: DISCONTINUED | OUTPATIENT
Start: 2020-01-09 | End: 2020-01-09

## 2020-01-09 RX ORDER — OXYCODONE HYDROCHLORIDE 5 MG/1
5 TABLET ORAL
Status: DISCONTINUED | OUTPATIENT
Start: 2020-01-09 | End: 2020-01-10 | Stop reason: HOSPADM

## 2020-01-09 RX ADMIN — SODIUM CHLORIDE: 0.9 INJECTION, SOLUTION INTRAVENOUS at 01:01

## 2020-01-09 RX ADMIN — ONDANSETRON 4 MG: 2 INJECTION, SOLUTION INTRAMUSCULAR; INTRAVENOUS at 10:01

## 2020-01-09 RX ADMIN — FENTANYL CITRATE 100 MCG: 50 INJECTION, SOLUTION INTRAMUSCULAR; INTRAVENOUS at 10:01

## 2020-01-09 RX ADMIN — LIDOCAINE HYDROCHLORIDE 50 MG: 20 INJECTION, SOLUTION INTRAVENOUS at 10:01

## 2020-01-09 RX ADMIN — SODIUM CHLORIDE: 0.9 INJECTION, SOLUTION INTRAVENOUS at 10:01

## 2020-01-09 RX ADMIN — DEXAMETHASONE SODIUM PHOSPHATE 4 MG: 4 INJECTION, SOLUTION INTRAMUSCULAR; INTRAVENOUS at 10:01

## 2020-01-09 RX ADMIN — ROCURONIUM BROMIDE 10 MG: 10 INJECTION, SOLUTION INTRAVENOUS at 10:01

## 2020-01-09 RX ADMIN — MIDAZOLAM HYDROCHLORIDE 2 MG: 1 INJECTION INTRAMUSCULAR; INTRAVENOUS at 10:01

## 2020-01-09 RX ADMIN — PROMETHAZINE HYDROCHLORIDE 6.25 MG: 25 INJECTION INTRAMUSCULAR; INTRAVENOUS at 04:01

## 2020-01-09 RX ADMIN — IOHEXOL 38 ML: 300 INJECTION, SOLUTION INTRAVENOUS at 11:01

## 2020-01-09 RX ADMIN — SUCCINYLCHOLINE CHLORIDE 120 MG: 20 INJECTION, SOLUTION INTRAMUSCULAR; INTRAVENOUS at 10:01

## 2020-01-09 RX ADMIN — INDOMETHACIN 100 MG: 50 SUPPOSITORY RECTAL at 11:01

## 2020-01-09 RX ADMIN — GLYCOPYRROLATE 0.2 MG: 0.2 INJECTION, SOLUTION INTRAMUSCULAR; INTRAVENOUS at 10:01

## 2020-01-09 RX ADMIN — FENTANYL CITRATE 50 MCG: 50 INJECTION, SOLUTION INTRAMUSCULAR; INTRAVENOUS at 10:01

## 2020-01-09 RX ADMIN — PROPOFOL 150 MG: 10 INJECTION, EMULSION INTRAVENOUS at 10:01

## 2020-01-09 NOTE — PLAN OF CARE
Pt currently not in room. CM will try again at later time to complete discharge assessment.      01/09/20 1005   Discharge Assessment   Assessment Type Discharge Planning Assessment

## 2020-01-09 NOTE — ANESTHESIA POSTPROCEDURE EVALUATION
Anesthesia Post Evaluation    Patient: Ad Lyons    Procedure(s) Performed: Procedure(s) (LRB):  ERCP (ENDOSCOPIC RETROGRADE CHOLANGIOPANCREATOGRAPHY) (N/A)    Final Anesthesia Type: general    Patient location during evaluation: PACU  Patient participation: Yes- Able to Participate  Level of consciousness: awake and alert  Post-procedure vital signs: reviewed and stable  Pain management: adequate  Airway patency: patent    PONV status at discharge: No PONV  Anesthetic complications: no      Cardiovascular status: blood pressure returned to baseline and hemodynamically stable  Respiratory status: unassisted  Hydration status: euvolemic  Follow-up not needed.          Vitals Value Taken Time   /75 1/9/2020 11:30 AM   Temp 36.7 °C (98.1 °F) 1/9/2020 11:25 AM   Pulse 99 1/9/2020 11:30 AM   Resp 24 1/9/2020 11:30 AM   SpO2 94 % 1/9/2020 11:30 AM         No case tracking events are documented in the log.      Pain/Jyothi Score: Pain Rating Prior to Med Admin: 5 (1/8/2020  1:58 PM)

## 2020-01-09 NOTE — H&P
"[facility] Medicine  History & Physical    Patient Name: Ad Lyons  MRN: [id number]  Admission Date: 1/8/2020  Attending Physician: Yamileth Jeronimo NP  Primary Care Provider: Jimmie Carbajal MD         Patient information was obtained from patient and ER records.     Subjective:     Principal Problem:Choledocholithiasis    Chief Complaint:   Chief Complaint   Patient presents with    Abdominal Pain     Presents with RUQ abdominal pain with nasuea and vomiting x 2 days        HPI: Ad Lyons is a 64 y.o. male with Meniere's disease and drake's esophagus who presents to the ED for evaluation of acute onset of right upper abdominal pain  3 days ago associated with nausea and vomiting which he attributes to chronic vertigo. He states RUQ pain was worse this am prompting medical attention. He states pain was 8/10 "like punched in stomach". He did not take anything for pain relief and nothing alleviated pain. He reports loose stools but no diarrhea. Denies blood in stool.     In the ED he underwent CT abdomen renal stone protocol which showed Small amounts of air within the biliary tree possibly due to prior sphincteroplasty. A MRCP was obtained and showed Prior cholecystectomy.  A 9 mm stone is identified in the distal common bile duct with common bile duct dilation to 1.5 cm.  Smaller filling defects higher in the common duct may represent debris or smaller stones. GI notified on results and plan for ERCP with dilation tomorrow.     Past Medical History:   Diagnosis Date    Drake's esophagus     DDD (degenerative disc disease), cervical     Diarrhea 1/7/2019    Ear ringing     Gallbladder disease     Gastric ulcer with perforation     GERD (gastroesophageal reflux disease)     Hyperlipidemia     Intractable vomiting with nausea 1/7/2019    Kidney stone     Meniere disease 2017    Dr TAYLA Mohan    Vertigo        Past Surgical History:   Procedure Laterality Date "    ADENOIDECTOMY      CHOLECYSTECTOMY      COLONOSCOPY N/A 12/27/2017    Procedure: COLONOSCOPY;  Surgeon: Adrian Castillo MD;  Location: [facility];  Service: Endoscopy;  Laterality: N/A;    COLONOSCOPY W/ POLYPECTOMY  1/2013    Dr Hughes    ERCP  12/26/2016    [facility]    ESOPHAGOGASTRODUODENOSCOPY  1/2013    Dr Hughes    TONSILLECTOMY      VASECTOMY         Review of patient's allergies indicates:   Allergen Reactions    Aspirin Other (See Comments)     Stomach ulcers       No current facility-administered medications on file prior to encounter.      Current Outpatient Medications on File Prior to Encounter   Medication Sig    azelastine (ASTELIN) 137 mcg (0.1 %) nasal spray 1 spray (137 mcg total) by Nasal route 2 (two) times daily. (Patient taking differently: 1 spray by Nasal route 2 (two) times daily as needed for Rhinitis. )    chlorhexidine (PERIDEX) 0.12 % solution RINSE WITH 1/2 OZ. ONCE DAILY FOR 5 DAYS    cholestyramine, with sugar, 4 gram Powd 3 scoops per day for diarrhea after gallbladder surgery    cyanocobalamin, vitamin B-12, (VITAMIN B-12) 1,000 mcg Subl Place 1 tablet under the tongue once daily.     fluticasone propionate (FLONASE) 50 mcg/actuation nasal spray 2 sprays (100 mcg total) by Each Nare route Daily. (Patient taking differently: 2 sprays by Each Nostril route daily as needed for Rhinitis. )    LOPERAMIDE HCL (IMODIUM A-D ORAL) Take 2 tablets by mouth daily as needed (diarrhea).     meclizine (ANTIVERT) 25 mg tablet Take 1 tablet (25 mg total) by mouth 3 (three) times daily as needed for Dizziness.    omeprazole (PRILOSEC) 40 MG capsule Take 1 capsule (40 mg total) by mouth 2 (two) times daily before meals.    ondansetron (ZOFRAN-ODT) 8 MG TbDL Take 1 tablet (8 mg total) by mouth every 8 (eight) hours. (Patient taking differently: Take 8 mg by mouth every 8 (eight) hours as needed (nausea). )    sucralfate (CARAFATE) 1 gram tablet Take 1 tablet (1 g  total) by mouth 4 (four) times daily as needed. Dissolve pill in 2 tablespoons water and drink (Patient taking differently: Take 1 g by mouth 4 (four) times daily before meals and nightly. Dissolve pill in 2 tablespoons water and drink)    triamterene-hydrochlorothiazide 37.5-25 mg (DYAZIDE) 37.5-25 mg per capsule 1/2 to 1 tablet every day during Meniere's episode    [DISCONTINUED] GI cocktail (mylanta 30 mL, lidocaine 2 % viscous 10 mL, dicyclomine 10 mL) 50 mL Take 10 mLs by mouth 3 (three) times daily as needed. (Patient taking differently: Take 10 mLs by mouth 3 (three) times daily as needed (ulcers). )     Family History     Problem Relation (Age of Onset)    Cancer Brother, Sister    Heart disease Father, Mother    Hyperlipidemia Mother    No Known Problems Sister, Daughter, Son, Daughter, Son        Tobacco Use    Smoking status: Former Smoker     Packs/day: 1.00     Start date:      Last attempt to quit:      Years since quittin.0    Smokeless tobacco: Never Used   Substance and Sexual Activity    Alcohol use: No    Drug use: Never    Sexual activity: Yes     Partners: Female     Review of Systems   Constitutional: Positive for appetite change and fatigue. Negative for chills and diaphoresis.   HENT: Positive for hearing loss. Negative for congestion and sore throat.    Eyes: Negative for pain and itching.   Respiratory: Negative for cough, shortness of breath and wheezing.    Cardiovascular: Negative for chest pain, palpitations and leg swelling.   Gastrointestinal: Positive for abdominal pain and nausea. Negative for blood in stool and constipation.   Endocrine: Negative for polyphagia and polyuria.   Genitourinary: Negative for dysuria, flank pain and hematuria.   Musculoskeletal: Negative for arthralgias, back pain and myalgias.   Skin: Negative for color change and pallor.   Neurological: Negative for dizziness, syncope and light-headedness.   Hematological: Negative for adenopathy.    Psychiatric/Behavioral: Negative for agitation and confusion. The patient is not nervous/anxious.      Objective:     Vital Signs (Most Recent):  Temp: 97.7 °F (36.5 °C) (01/08/20 1329)  Pulse: 88 (01/08/20 1727)  Resp: 16 (01/08/20 1328)  BP: 112/68 (01/08/20 1701)  SpO2: 98 % (01/08/20 1727) Vital Signs (24h Range):  Temp:  [97.7 °F (36.5 °C)] 97.7 °F (36.5 °C)  Pulse:  [63-94] 88  Resp:  [16] 16  SpO2:  [89 %-98 %] 98 %  BP: (108-145)/(68-90) 112/68        There is no height or weight on file to calculate BMI.    Physical Exam   Constitutional: He is oriented to person, place, and time. He appears well-developed and well-nourished.   HENT:   Head: Normocephalic and atraumatic.   Right Ear: External ear normal.   Left Ear: External ear normal.   Nose: Nose normal.   Mouth/Throat: Oropharynx is clear and moist.   [place]   Eyes: Pupils are equal, round, and reactive to light. Conjunctivae and EOM are normal.   Neck: Normal range of motion. Neck supple. No JVD present.   Cardiovascular: Normal rate, regular rhythm, normal heart sounds and intact distal pulses.   No murmur heard.  Pulmonary/Chest: Effort normal and breath sounds normal. He has no wheezes.   Abdominal: Soft. Bowel sounds are normal. There is tenderness. There is guarding.   RUQ tenderness   Musculoskeletal: Normal range of motion.   Neurological: He is alert and oriented to person, place, and time. Coordination normal.   Skin: Skin is warm and dry. Capillary refill takes 2 to 3 seconds.   Psychiatric: He has a normal mood and affect. His behavior is normal.   Nursing note and vitals reviewed.        CRANIAL NERVES     CN III, IV, VI   Pupils are equal, round, and reactive to light.  Extraocular motions are normal.        Significant Labs:   BMP:   Recent Labs   Lab 01/08/20  1350   *      K 3.9      CO2 23   BUN 16   CREATININE 0.9   CALCIUM 9.6     CBC:   Recent Labs   Lab 01/08/20  1350   WBC 9.71   HGB 16.2   HCT 46.9           Significant Imaging: I have reviewed and interpreted all pertinent imaging results/findings within the past 24 hours.       Assessment/Plan:     * Choledocholithiasis  Consult GI. NPO after MN for ERCP. Gentle IV hydration. Pain and nausea control      Essential hypertension  Chronic, controlled.  Latest blood pressure and vitals reviewed-   Temp:  [97.7 °F (36.5 °C)-97.9 °F (36.6 °C)]   Pulse:  [63-98]   Resp:  [14-16]   BP: (106-145)/(67-90)   SpO2:  [89 %-99 %] .   Home meds for hypertension were reviewed and noted below. Hospital anti-hypertensive changes were made as shown below.  Hypertension Medications             triamterene-hydrochlorothiazide 37.5-25 mg (DYAZIDE) 37.5-25 mg per capsule 1/2 to 1 tablet every day during Meniere's episode        Will utilize p.r.n. blood pressure medication only if patient's blood pressure greater than  180/110 and he develops symptoms such as worsening chest pain or shortness of breath.        Sensorineural hearing loss (SNHL) of both ears  Chronic due to Meniere. Continue meclizine as needed      Gastroesophageal reflux disease  Chronic resume home PPI        VTE Risk Mitigation (From admission, onward)         Ordered     IP VTE LOW RISK PATIENT  Once      01/08/20 9682                   Yamileth Jeronimo NP  Department of Hospital Medicine   [facility]

## 2020-01-09 NOTE — TRANSFER OF CARE
"Anesthesia Transfer of Care Note    Patient: Ad Lyons    Procedure(s) Performed: Procedure(s) (LRB):  ERCP (ENDOSCOPIC RETROGRADE CHOLANGIOPANCREATOGRAPHY) (N/A)    Patient location: PACU    Anesthesia Type: general    Transport from OR: Transported from OR on 2-3 L/min O2 by NC with adequate spontaneous ventilation    Post pain: adequate analgesia    Post assessment: no apparent anesthetic complications and tolerated procedure well    Post vital signs: stable    Level of consciousness: sedated and responds to stimulation    Nausea/Vomiting: no nausea/vomiting    Complications: none    Transfer of care protocol was followed      Last vitals:   Visit Vitals  /73   Pulse 79   Temp 36.9 °C (98.4 °F)   Resp (!) 21   Ht 6' 2" (1.88 m)   Wt 100.8 kg (222 lb 3.6 oz)   SpO2 (!) 92%   BMI 28.53 kg/m²     "

## 2020-01-09 NOTE — ANESTHESIA PREPROCEDURE EVALUATION
01/09/2020  Ad Lyons is a 64 y.o., male.    Anesthesia Evaluation    I have reviewed the Patient Summary Reports.    I have reviewed the Nursing Notes.      Review of Systems  Anesthesia Hx:  No problems with previous Anesthesia    Cardiovascular:   Hypertension, well controlled    Hepatic/GI:   PUD, GERD, well controlled        Physical Exam  General:  Well nourished    Airway/Jaw/Neck:  Airway Findings: Mallampati: II                Anesthesia Plan  Type of Anesthesia, risks & benefits discussed:  Anesthesia Type:  general  Patient's Preference:   Intra-op Monitoring Plan:   Intra-op Monitoring Plan Comments:   Post Op Pain Control Plan:   Post Op Pain Control Plan Comments:   Induction:   IV  Beta Blocker:  Patient is not currently on a Beta-Blocker (No further documentation required).       Informed Consent: Patient understands risks and agrees with Anesthesia plan.  Questions answered. Anesthesia consent signed with patient.  ASA Score: 2     Day of Surgery Review of History & Physical:    H&P update referred to the surgeon.         Ready For Surgery From Anesthesia Perspective.

## 2020-01-09 NOTE — [employer] PATIENT INSTRUCTIONS
Discharge Summary/Instructions after an Endoscopic Procedure  Patient Name: Ad Lyons  Patient MRN: [id number]  Patient YOB: 1955  Thursday, January 09, 2020  Adrian Cho MD  RESTRICTIONS:  During your procedure today, you received medications for sedation.  These   medications may affect your judgment, balance and coordination.  Therefore,   for 24 hours, you have the following restrictions:   - DO NOT drive a car, operate machinery, make legal/financial decisions,   sign important papers or drink alcohol.    ACTIVITY:  Today: no heavy lifting, straining or running due to procedural   sedation/anesthesia.  The following day: return to full activity including work.  DIET:  Eat and drink normally unless instructed otherwise.     TREATMENT FOR COMMON SIDE EFFECTS:  - Mild abdominal pain, nausea, belching, bloating or excessive gas:  rest,   eat lightly and use a heating pad.  - Sore Throat: treat with throat lozenges and/or gargle with warm salt   water.  - Because air was used during the procedure, expelling large amounts of air   from your rectum or belching is normal.  - If a bowel prep was taken, you may not have a bowel movement for 1-3 days.    This is normal.  SYMPTOMS TO WATCH FOR AND REPORT TO YOUR PHYSICIAN:  1. Abdominal pain or bloating, other than gas cramps.  2. Chest pain.  3. Back pain.  4. Signs of infection such as: chills or fever occurring within 24 hours   after the procedure.  5. Rectal bleeding, which would show as bright red, maroon, or black stools.   (A tablespoon of blood from the rectum is not serious, especially if   hemorrhoids are present.)  6. Vomiting.  7. Weakness or dizziness.  GO DIRECTLY TO THE NEAREST EMERGENCY ROOM IF YOU HAVE ANY OF THE FOLLOWING:      Difficulty breathing              Chills and/or fever over 101 F   Persistent vomiting and/or vomiting blood   Severe abdominal pain   Severe chest pain   Black, tarry stools   Bleeding- more than one  tablespoon   Any other symptom or condition that you feel may need urgent attention  Your doctor recommends these additional instructions:  If any biopsies were taken, your doctors clinic will contact you in 1 to 2   weeks with any results.  - Avoid aspirin and nonsteroidal anti-inflammatory medicines.   - Return patient to hospital [facility] for ongoing care.   - Advance diet as tolerated and clear liquid diet.   - Repeat ERCP in 6 weeks to remove stent.  For questions, problems or results please call your physician - Adrian Cho MD at Work:  [phone].  [facility], EMERGENCY ROOM PHONE NUMBER: [phone]  IF A COMPLICATION OR EMERGENCY SITUATION ARISES AND YOU ARE UNABLE TO REACH   YOUR PHYSICIAN - GO DIRECTLY TO THE EMERGENCY ROOM.  Adrian Cho MD  1/9/2020 11:34:57 AM  This report has been verified and signed electronically.  [employer]

## 2020-01-09 NOTE — ASSESSMENT & PLAN NOTE
Chronic, controlled.  Latest blood pressure and vitals reviewed-   Temp:  [97.7 °F (36.5 °C)-97.9 °F (36.6 °C)]   Pulse:  [63-98]   Resp:  [14-16]   BP: (106-145)/(67-90)   SpO2:  [89 %-99 %] .   Home meds for hypertension were reviewed and noted below. Hospital anti-hypertensive changes were made as shown below.  Hypertension Medications             triamterene-hydrochlorothiazide 37.5-25 mg (DYAZIDE) 37.5-25 mg per capsule 1/2 to 1 tablet every day during Meniere's episode        Will utilize p.r.n. blood pressure medication only if patient's blood pressure greater than  180/110 and he develops symptoms such as worsening chest pain or shortness of breath.

## 2020-01-09 NOTE — SUBJECTIVE & OBJECTIVE
Interval History:  Patient with persistent abdominal pain and nausea.  Nausea is mildly improved with antiemetics.  He is NPO for ERCP today.  Afebrile and vital signs stable    Review of Systems   Constitutional: Positive for activity change and appetite change. Negative for chills and diaphoresis.   HENT: Negative for congestion, hearing loss and sore throat.    Respiratory: Negative for cough, shortness of breath and wheezing.    Cardiovascular: Negative for chest pain, palpitations and leg swelling.   Gastrointestinal: Positive for abdominal pain and nausea. Negative for blood in stool.   Genitourinary: Negative for dysuria, flank pain and hematuria.   Musculoskeletal: Negative for arthralgias and back pain.   Neurological: Negative for dizziness, syncope and light-headedness.   Psychiatric/Behavioral: Negative for agitation and confusion. The patient is not nervous/anxious.      Objective:     Vital Signs (Most Recent):  Temp: 98 °F (36.7 °C) (01/09/20 1603)  Pulse: 67 (01/09/20 1603)  Resp: 16 (01/09/20 1603)  BP: 101/61 (01/09/20 1603)  SpO2: 95 % (01/09/20 1603) Vital Signs (24h Range):  Temp:  [97.9 °F (36.6 °C)-99.4 °F (37.4 °C)] 98 °F (36.7 °C)  Pulse:  [] 67  Resp:  [13-24] 16  SpO2:  [89 %-99 %] 95 %  BP: (101-136)/(61-90) 101/61     Weight: 100.8 kg (222 lb 3.6 oz)  Body mass index is 28.53 kg/m².    Intake/Output Summary (Last 24 hours) at 1/9/2020 1607  Last data filed at 1/9/2020 1200  Gross per 24 hour   Intake 800 ml   Output 0 ml   Net 800 ml      Physical Exam   Constitutional: He is oriented to person, place, and time. He appears well-developed and well-nourished.   HENT:   Head: Normocephalic and atraumatic.   Right Ear: External ear normal.   Left Ear: External ear normal.   Nose: Nose normal.   Mouth/Throat: Oropharynx is clear and moist.   Eyes: Pupils are equal, round, and reactive to light. Conjunctivae and EOM are normal.   Neck: Normal range of motion. Neck supple.    Cardiovascular: Normal rate, regular rhythm, normal heart sounds and intact distal pulses.   Pulmonary/Chest: Effort normal and breath sounds normal. He has no wheezes.   Abdominal: Soft. Bowel sounds are normal. There is tenderness.   Epigastric and right upper quadrant tenderness   Musculoskeletal: Normal range of motion.   Neurological: He is alert and oriented to person, place, and time. Coordination normal.   Skin: Skin is warm and dry.   Psychiatric: He has a normal mood and affect. His behavior is normal.   Nursing note and vitals reviewed.      Significant Labs:   BMP:   Recent Labs   Lab 01/09/20  0458      *   K 3.8      CO2 21*   BUN 16   CREATININE 1.0   CALCIUM 8.8     CBC:   Recent Labs   Lab 01/08/20  1350 01/09/20  0458   WBC 9.71 11.35   HGB 16.2 15.0   HCT 46.9 44.5    223       Significant Imaging: I have reviewed and interpreted all pertinent imaging results/findings within the past 24 hours.

## 2020-01-09 NOTE — ANESTHESIA PROCEDURE NOTES
Intubation  Performed by: Paul Mulligan CRNA  Authorized by: Jf Mai MD     Intubation:     Induction:  Intravenous    Intubated:  Postinduction    Attempts:  1    Attempted By:  CRNA    Method of Intubation:  Direct    Blade:  [place] 2    Laryngeal View Grade: Laryngeal Manipulation      Laryngeal View Grade (2nd Attempt): Laryngeal Manipulation      Laryngeal View Grade (3rd Attempt): Laryngeal Manipulation      Difficult Airway Encountered?: No      Complications:  None    Airway Device:  Oral endotracheal tube    Airway Device Size:  7.5    Style/Cuff Inflation:  Cuffed (inflated to minimal occlusive pressure)    Tube secured:  23    Secured at:  The lips    Placement Verified By:  Capnometry    Complicating Factors:  Anterior larynx and large prominent central incisors    Findings Post-Intubation:  BS equal bilateral and atraumatic/condition of teeth unchanged

## 2020-01-09 NOTE — PROGRESS NOTES
"[facility] Medicine  Progress Note    Patient Name: Ad Lyons  MRN: [id number]  Patient Class: OP- Observation   Admission Date: 1/8/2020  Length of Stay: 0 days  Attending Physician: Ruddy Keith MD  Primary Care Provider: Jimmie Carbajal MD        Subjective:     Principal Problem:Choledocholithiasis        HPI:  Ad Lyons is a 64 y.o. male with Meniere's disease and drake's esophagus who presents to the ED for evaluation of acute onset of right upper abdominal pain  3 days ago associated with nausea and vomiting which he attributes to chronic vertigo. He states RUQ pain was worse this am prompting medical attention. He states pain was 8/10 "like punched in stomach". He did not take anything for pain relief and nothing alleviated pain. He reports loose stools but no diarrhea. Denies blood in stool.     In the ED he underwent CT abdomen renal stone protocol which showed Small amounts of air within the biliary tree possibly due to prior sphincteroplasty. A MRCP was obtained and showed Prior cholecystectomy.  A 9 mm stone is identified in the distal common bile duct with common bile duct dilation to 1.5 cm.  Smaller filling defects higher in the common duct may represent debris or smaller stones. GI notified on results and plan for ERCP with dilation tomorrow.     Overview/Hospital Course:  No notes on file    Interval History:  Patient with persistent abdominal pain and nausea.  Nausea is mildly improved with antiemetics.  He is NPO for ERCP today.  Afebrile and vital signs stable    Review of Systems   Constitutional: Positive for activity change and appetite change. Negative for chills and diaphoresis.   HENT: Negative for congestion, hearing loss and sore throat.    Respiratory: Negative for cough, shortness of breath and wheezing.    Cardiovascular: Negative for chest pain, palpitations and leg swelling.   Gastrointestinal: Positive for abdominal pain and nausea. " Negative for blood in stool.   Genitourinary: Negative for dysuria, flank pain and hematuria.   Musculoskeletal: Negative for arthralgias and back pain.   Neurological: Negative for dizziness, syncope and light-headedness.   Psychiatric/Behavioral: Negative for agitation and confusion. The patient is not nervous/anxious.      Objective:     Vital Signs (Most Recent):  Temp: 98 °F (36.7 °C) (01/09/20 1603)  Pulse: 67 (01/09/20 1603)  Resp: 16 (01/09/20 1603)  BP: 101/61 (01/09/20 1603)  SpO2: 95 % (01/09/20 1603) Vital Signs (24h Range):  Temp:  [97.9 °F (36.6 °C)-99.4 °F (37.4 °C)] 98 °F (36.7 °C)  Pulse:  [] 67  Resp:  [13-24] 16  SpO2:  [89 %-99 %] 95 %  BP: (101-136)/(61-90) 101/61     Weight: 100.8 kg (222 lb 3.6 oz)  Body mass index is 28.53 kg/m².    Intake/Output Summary (Last 24 hours) at 1/9/2020 1607  Last data filed at 1/9/2020 1200  Gross per 24 hour   Intake 800 ml   Output 0 ml   Net 800 ml      Physical Exam   Constitutional: He is oriented to person, place, and time. He appears well-developed and well-nourished.   HENT:   Head: Normocephalic and atraumatic.   Right Ear: External ear normal.   Left Ear: External ear normal.   Nose: Nose normal.   Mouth/Throat: Oropharynx is clear and moist.   Eyes: Pupils are equal, round, and reactive to light. Conjunctivae and EOM are normal.   Neck: Normal range of motion. Neck supple.   Cardiovascular: Normal rate, regular rhythm, normal heart sounds and intact distal pulses.   Pulmonary/Chest: Effort normal and breath sounds normal. He has no wheezes.   Abdominal: Soft. Bowel sounds are normal. There is tenderness.   Epigastric and right upper quadrant tenderness   Musculoskeletal: Normal range of motion.   Neurological: He is alert and oriented to person, place, and time. Coordination normal.   Skin: Skin is warm and dry.   Psychiatric: He has a normal mood and affect. His behavior is normal.   Nursing note and vitals reviewed.      Significant Labs:    BMP:   Recent Labs   Lab 01/09/20  0458      *   K 3.8      CO2 21*   BUN 16   CREATININE 1.0   CALCIUM 8.8     CBC:   Recent Labs   Lab 01/08/20  1350 01/09/20  0458   WBC 9.71 11.35   HGB 16.2 15.0   HCT 46.9 44.5    223       Significant Imaging: I have reviewed and interpreted all pertinent imaging results/findings within the past 24 hours.      Assessment/Plan:      * Choledocholithiasis  Consult GI. NPO  for ERCP. Gentle IV hydration. Pain and nausea control.  Add Phenergan      Essential hypertension  Chronic, controlled.  Latest blood pressure and vitals reviewed-   Temp:  [97.7 °F (36.5 °C)-97.9 °F (36.6 °C)]   Pulse:  [63-98]   Resp:  [14-16]   BP: (106-145)/(67-90)   SpO2:  [89 %-99 %] .   Home meds for hypertension were reviewed and noted below. Hospital anti-hypertensive changes were made as shown below.  Hypertension Medications             triamterene-hydrochlorothiazide 37.5-25 mg (DYAZIDE) 37.5-25 mg per capsule 1/2 to 1 tablet every day during Meniere's episode        Will utilize p.r.n. blood pressure medication only if patient's blood pressure greater than  180/110 and he develops symptoms such as worsening chest pain or shortness of breath.        Sensorineural hearing loss (SNHL) of both ears  Chronic due to Meniere. Continue meclizine as needed      Gastroesophageal reflux disease  Chronic resume home PPI        VTE Risk Mitigation (From admission, onward)         Ordered     IP VTE LOW RISK PATIENT  Once      01/08/20 5462                      Yamileth Jeronimo NP  Department of Hospital Medicine   [facility]

## 2020-01-09 NOTE — PLAN OF CARE
POC/Meds reviewed, pt verbalized understanding. Vitals stable. Afebrile.  IV fluids infusing per order. Neuro checks performed, no deficits noted. Up with standby. Minimal complaints of pain. Nausea medication given PRN with minimal relief. Pt remained NPO after midnight. Repositions self. Hourly/Q2hr rounding performed, safety maintained. Bed in lowest position, wheels locked, SR up x2, call light in easy reach. No  complaints at this time. Will continue to monitor.

## 2020-01-10 VITALS
DIASTOLIC BLOOD PRESSURE: 56 MMHG | TEMPERATURE: 99 F | WEIGHT: 222.25 LBS | HEART RATE: 69 BPM | OXYGEN SATURATION: 94 % | BODY MASS INDEX: 28.52 KG/M2 | RESPIRATION RATE: 20 BRPM | HEIGHT: 74 IN | SYSTOLIC BLOOD PRESSURE: 96 MMHG

## 2020-01-10 LAB
ALBUMIN SERPL BCP-MCNC: 3.6 G/DL (ref 3.5–5.2)
ALP SERPL-CCNC: 93 U/L (ref 55–135)
ALT SERPL W/O P-5'-P-CCNC: 278 U/L (ref 10–44)
ANION GAP SERPL CALC-SCNC: 8 MMOL/L (ref 8–16)
AST SERPL-CCNC: 140 U/L (ref 10–40)
BASOPHILS # BLD AUTO: 0.01 K/UL (ref 0–0.2)
BASOPHILS NFR BLD: 0.1 % (ref 0–1.9)
BILIRUB DIRECT SERPL-MCNC: 0.6 MG/DL (ref 0.1–0.3)
BILIRUB SERPL-MCNC: 1.4 MG/DL (ref 0.1–1)
BUN SERPL-MCNC: 13 MG/DL (ref 8–23)
CALCIUM SERPL-MCNC: 8.7 MG/DL (ref 8.7–10.5)
CHLORIDE SERPL-SCNC: 107 MMOL/L (ref 95–110)
CO2 SERPL-SCNC: 24 MMOL/L (ref 23–29)
CREAT SERPL-MCNC: 0.8 MG/DL (ref 0.5–1.4)
DIFFERENTIAL METHOD: ABNORMAL
EOSINOPHIL # BLD AUTO: 0.1 K/UL (ref 0–0.5)
EOSINOPHIL NFR BLD: 1 % (ref 0–8)
ERYTHROCYTE [DISTWIDTH] IN BLOOD BY AUTOMATED COUNT: 13.7 % (ref 11.5–14.5)
EST. GFR  (AFRICAN AMERICAN): >60 ML/MIN/1.73 M^2
EST. GFR  (NON AFRICAN AMERICAN): >60 ML/MIN/1.73 M^2
GLUCOSE SERPL-MCNC: 104 MG/DL (ref 70–110)
HCT VFR BLD AUTO: 41.8 % (ref 40–54)
HGB BLD-MCNC: 14 G/DL (ref 14–18)
IMM GRANULOCYTES # BLD AUTO: 0.03 K/UL (ref 0–0.04)
LYMPHOCYTES # BLD AUTO: 1.2 K/UL (ref 1–4.8)
LYMPHOCYTES NFR BLD: 16.5 % (ref 18–48)
MCH RBC QN AUTO: 29.9 PG (ref 27–31)
MCHC RBC AUTO-ENTMCNC: 33.5 G/DL (ref 32–36)
MCV RBC AUTO: 89 FL (ref 82–98)
MONOCYTES # BLD AUTO: 0.7 K/UL (ref 0.3–1)
MONOCYTES NFR BLD: 9.5 % (ref 4–15)
NEUTROPHILS # BLD AUTO: 5.3 K/UL (ref 1.8–7.7)
NEUTROPHILS NFR BLD: 72.5 % (ref 38–73)
NRBC BLD-RTO: 0 /100 WBC
PLATELET # BLD AUTO: 204 K/UL (ref 150–350)
PMV BLD AUTO: 9.9 FL (ref 9.2–12.9)
POTASSIUM SERPL-SCNC: 4.2 MMOL/L (ref 3.5–5.1)
PROT SERPL-MCNC: 6.4 G/DL (ref 6–8.4)
RBC # BLD AUTO: 4.68 M/UL (ref 4.6–6.2)
SODIUM SERPL-SCNC: 139 MMOL/L (ref 136–145)
WBC # BLD AUTO: 7.29 K/UL (ref 3.9–12.7)

## 2020-01-10 PROCEDURE — 25000003 PHARM REV CODE 250: Performed by: NURSE PRACTITIONER

## 2020-01-10 PROCEDURE — 80076 HEPATIC FUNCTION PANEL: CPT

## 2020-01-10 PROCEDURE — 85025 COMPLETE CBC W/AUTO DIFF WBC: CPT

## 2020-01-10 PROCEDURE — 94761 N-INVAS EAR/PLS OXIMETRY MLT: CPT

## 2020-01-10 PROCEDURE — 93005 ELECTROCARDIOGRAM TRACING: CPT

## 2020-01-10 PROCEDURE — 80048 BASIC METABOLIC PNL TOTAL CA: CPT

## 2020-01-10 PROCEDURE — 99214 OFFICE O/P EST MOD 30 MIN: CPT | Mod: ,,, | Performed by: INTERNAL MEDICINE

## 2020-01-10 PROCEDURE — 36415 COLL VENOUS BLD VENIPUNCTURE: CPT

## 2020-01-10 PROCEDURE — G0378 HOSPITAL OBSERVATION PER HR: HCPCS

## 2020-01-10 PROCEDURE — 99214 PR OFFICE/OUTPT VISIT, EST, LEVL IV, 30-39 MIN: ICD-10-PCS | Mod: ,,, | Performed by: INTERNAL MEDICINE

## 2020-01-10 RX ADMIN — PANTOPRAZOLE SODIUM 40 MG: 40 TABLET, DELAYED RELEASE ORAL at 09:01

## 2020-01-10 NOTE — NURSING
PIV removed. Tele removed. D/c instructions given and explained, pt and pt's spouse verbalized understanding. Pt aware of f/u appt. All questions answered. Pt left unit safely with all belongings via w/c escorted by transport tech.

## 2020-01-10 NOTE — PROGRESS NOTES
"[facility] Note    CC: Choledocholithiasis    HPI 64 y.o. male with choledocholithiasis, recent onset, now status post ERCP with balloon sweep and stent placement, with abdominal pain improved.  Patient tolerating PO.  LFTs stable with mild decrease in bilirubin today.  No other acute issues.    Past Medical History:   Diagnosis Date    Mills's esophagus     DDD (degenerative disc disease), cervical     Diarrhea 1/7/2019    Ear ringing     Gallbladder disease     Gastric ulcer with perforation     GERD (gastroesophageal reflux disease)     Hyperlipidemia     Intractable vomiting with nausea 1/7/2019    Kidney stone     Meniere disease 2017    Dr TAYLA Mohan    Vertigo          Review of Systems  General ROS: negative for - chills, fever or weight loss  Cardiovascular ROS: no chest pain or dyspnea on exertion  Gastrointestinal ROS: no pain    Physical Examination  /65   Pulse (!) 51   Temp 98 °F (36.7 °C)   Resp 16   Ht 6' 2" (1.88 m)   Wt 100.8 kg (222 lb 3.6 oz)   SpO2 96%   BMI 28.53 kg/m²   General appearance: alert, cooperative, no distress  HENT: Normocephalic, atraumatic, neck symmetrical, no nasal discharge, sclera anicteric   Lungs: clear to auscultation bilaterally, symmetric chest wall expansion bilaterally  Heart: regular rate and rhythm without rub; no displacement of the PMI   Abdomen: soft, NT  Extremities: extremities symmetric; no clubbing, cyanosis, or edema  Neurologic: Alert and oriented X 3, no sensory or motor neurologic deficits      Labs:  Lab Results   Component Value Date    WBC 7.29 01/10/2020    HGB 14.0 01/10/2020    HCT 41.8 01/10/2020    MCV 89 01/10/2020     01/10/2020         CMP  Sodium   Date Value Ref Range Status   01/10/2020 139 136 - 145 mmol/L Final     Potassium   Date Value Ref Range Status   01/10/2020 4.2 3.5 - 5.1 mmol/L Final     Chloride   Date Value Ref Range Status   01/10/2020 107 95 - 110 mmol/L Final     CO2   Date Value " 3/6/2017       RE: Evangelista Gipson  [address] [place] [address]     Dear Colleague,    Thank you for referring your patient, Evangelista Gipson, to the [facility] at [facility]. Please see a copy of my visit note below.    Vascular Surgery Clinic Note    Date of Service: 03/06/2017                 Mr. Gipson is a 57 year old diabetic male with history of  CKD stage III, TIA, factor V Leiden,and CAD with CHF secondary to an MI in late January of 2016 with an EF 30% and a complicated cardiac history including multiple PCI , VT ablation for VT storm and LVAD placed 01/29/2016 currently on the waiting list for heart transplant.    Mr. Gipson was referred to vascular surgery clinic today at the behest of his primary care physician after work-up for peripheral vascular disease. Mr. Gipson complains of bilateral lower extremity swelling and purplish discoloration observed in his toes made worse when the legs are in the dependent position since his LVAD was placed last year.  He has been compliant with DAVID stockings and notes that the swelling is better with TEDS use.  He also complains of right knee pain that is worse with ambulation. He can walk varying distances daily but observes frequent onset of excruciating right knee pain that precludes him from ambulating.  After exercise he claims the knee locks preventing him from bending it to enter his vehicle.  He denies buttock, thigh or calf pain. He does note infrequent numbness of the top of his right foot with increased swelling.  Mr. Gipson denies history of DVT, slow healing wounds or ulcers to the lower extremity.       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     Diabetes mellitus, type 2 (H)      Factor V deficiency (H)      ICD (implantable cardiac defibrillator) in place      [place] [id number]     LVAD (left  Ref Range Status   01/10/2020 24 23 - 29 mmol/L Final     Glucose   Date Value Ref Range Status   01/10/2020 104 70 - 110 mg/dL Final     BUN, Bld   Date Value Ref Range Status   01/10/2020 13 8 - 23 mg/dL Final     Creatinine   Date Value Ref Range Status   01/10/2020 0.8 0.5 - 1.4 mg/dL Final     Calcium   Date Value Ref Range Status   01/10/2020 8.7 8.7 - 10.5 mg/dL Final     Total Protein   Date Value Ref Range Status   01/10/2020 6.4 6.0 - 8.4 g/dL Final     Albumin   Date Value Ref Range Status   01/10/2020 3.6 3.5 - 5.2 g/dL Final     Total Bilirubin   Date Value Ref Range Status   01/10/2020 1.4 (H) 0.1 - 1.0 mg/dL Final     Comment:     For infants and newborns, interpretation of results should be based  on gestational age, weight and in agreement with clinical  observations.  Premature Infant recommended reference ranges:  Up to 24 hours.............<8.0 mg/dL  Up to 48 hours............<12.0 mg/dL  3-5 days..................<15.0 mg/dL  6-29 days.................<15.0 mg/dL       Alkaline Phosphatase   Date Value Ref Range Status   01/10/2020 93 55 - 135 U/L Final     AST   Date Value Ref Range Status   01/10/2020 140 (H) 10 - 40 U/L Final     ALT   Date Value Ref Range Status   01/10/2020 278 (H) 10 - 44 U/L Final     Anion Gap   Date Value Ref Range Status   01/10/2020 8 8 - 16 mmol/L Final     eGFR if    Date Value Ref Range Status   01/10/2020 >60 >60 mL/min/1.73 m^2 Final     eGFR if non    Date Value Ref Range Status   01/10/2020 >60 >60 mL/min/1.73 m^2 Final     Comment:     Calculation used to obtain the estimated glomerular filtration  rate (eGFR) is the CKD-EPI equation.                Assessment:   1.  Choledocholithiasis  2.  Abdominal pain    Plan:  1.  Advance diet  2.  OK to discharge home from GI standpoint  3.  Follow up in office in 4 weeks.  Will need repeat ERCP for stent removal and balloon sweep in 6 weeks.  GI to sign off for now.  Call for  ventricular assist device) present (H) 1/29/2016     MI (myocardial infarction) (H)      stentsx2     Organ transplant candidate 5/27/2015     Pleural effusion      Pneumonia      S/P ablation of ventricular arrhythmia      Sleep apnea      TIA (transient ischaemic attack)      VT (ventricular tachycardia) (H)      Past Surgical History   Procedure Laterality Date     Aicd placement  12/2014     Heart ablation for vtach  12/2014     x 3     Nasal/sinus polypectomy  1980     Insert ventricular assist device left (heartmate ii) N/A 1/29/2016     Procedure: INSERT VENTRICULAR ASSIST DEVICE LEFT (HEARTMATE II);  Surgeon: Art Naidu MD;  Location:  OR     Family History   Problem Relation Age of Onset     Coronary Artery Disease Mother      CABG ~ 2000; starting to have dementia     Hypertension Father      Takens atenolol and an aspirin, may have PVD      DIABETES Maternal Aunt      Thyroid Disease No family hx of      Social History     Social History     Marital status:      Spouse name: N/A     Number of children: N/A     Years of education: N/A     Occupational History     Not on file.     Social History Main Topics     Smoking status: Former Smoker     Types: Cigarettes     Quit date: 12/1/2014     Smokeless tobacco: Not on file     Alcohol use No     Drug use: Yes      Comment: Marijuana 40 years ago     Sexual activity: Not on file     Other Topics Concern     Not on file     Social History Narrative    Evangelista has been on medical disability since his heart issues started in 12/2014. 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 He lives with his wife Jessica in [place]. They have a morelos dog at home.        BP 96/68 (BP Location: Right arm)  Pulse 92  Resp 17  SpO2 97%     General: Morbidly Obese  male sitting supine in chair age appearing older than stated.   Cor: RRR  LVAD intact  Pulm: Unlabored breathing   Abd: Obese. S/NT/ND  LE: Warm and well perfused bilaterally. There is trace swelling of the RLE extending into the foot. Scattered telangiectasias are observed.  Multiphasic Right DP/PT and Left PT with a monophasic DP.       Assessment: We had the pleasure of evaluating Mr. Evangelista Gipson, a 56 yo diabetic male with history of ICM, CHF with LVAD who presents to clinic with complaints of leg swelling and right knee pain.  Upon review of his symptoms and physical exam it is doubtful that Mr. Gipson is suffering from vascular pathology.    Plan:     1.  Right knee pain- Likely arthritic/musculoskeletal.  We recommend radiographs of the RLE and orthopedic evaluation.  We will obtain and JOSHUA to establish a baseline. Images from PCP would be appreciated for review. We will attempt to obtain his most recent CT and ultrasound as neither were reviewed during this visit.   2.  Swelling- bilateral lower extremity edema is likely secondary to poor control of volume status secondary to CHF.  We recommend medical optimization with Bumex and continued compression therapy.  Compression stockings 20-30 mmHG have been prescribed.   3. Mr. Gipson was also counseled on weight loss. He is interested in pursuing Cardiac Rehab  4.  Follow-up with Vascular Surgery in 3 months.     Lakhwinder Jacobo MD   Vascular Surgery Fellow   Pager: [phone]      I personally examined the patient and agree with the findings above.  I discussed the patient with the resident / fellow and care team, and agree with the assessment and plan of care as documented in the note.  Vascular surgery staff  Pain does not appear to be arterial in origin, not classic claudication style pain.  Agree more likely arthritic in nature.  We do not have his images to evaluate, but he likely has some PVD, whether it is SFA disease or popliteal artery disease.  We will obtain baseline images and have asked him to obtain his images  questions.    Adrian Cho MD  [facility]  [address], [address]  [place] [address]  Office: [phone]  Fax: [phone]     recently performed from his PCP to be sent to us so that we can guide the Cardiac surgeons in the future for femoral / peripheral access when he goes for his heart transplant.  Long discussion regarding lifestyle changes / modifications and weight loss.  I told him we will see him back in 3 months to monitor his weight loss progress.    Lyndsey Forbes MD

## 2020-01-10 NOTE — PLAN OF CARE
Patient aao x 4. Denies pain after procedure. Plan of care reviewed with patient, verbalized understanding. Tolerating clear liquid diet, advanced to regular. UA collected. Clear yellow output noted. Bed in lowest position and call light within reach. Remained free from fall and injury.

## 2020-01-10 NOTE — PLAN OF CARE
POC/Meds reviewed, pt verbalized understanding. Bradycardic Tele placed. Afebrile. Up with standby. Minimal complaints of pain. No nausea this shift. Tolerating solids well. Repositions self. Hourly/Q2hr rounding performed, safety maintained. Bed in lowest position, wheels locked, SR up x2, call light in easy reach. No  complaints at this time. Will continue to monitor.

## 2020-01-10 NOTE — PLAN OF CARE
01/10/20 0729   PRE-TX-O2   O2 Device (Oxygen Therapy) room air   SpO2 95 %   Pulse Oximetry Type Intermittent   $ Pulse Oximetry - Multiple Charge Pulse Oximetry - Multiple

## 2020-01-10 NOTE — PLAN OF CARE
CM met with pt bedside to complete discharge assessment. Pt verified information on facesheet as correct. Denies POA/LW. Reports living at listed address with spouse. PCP is Dr. Carbajal. Pharm is [facility] on [facility] [address]. Denies hh/hd/dme. Reports being independent with ADLs. DC plan is home. CM following to assist in any DC needs.      01/09/20 1510   Discharge Assessment   Assessment Type Discharge Planning Assessment   Confirmed/corrected address and phone number on facesheet? Yes   Assessment information obtained from? Patient   Communicated expected length of stay with patient/caregiver yes   Prior to hospitilization cognitive status: Alert/Oriented   Prior to hospitalization functional status: Independent   Current cognitive status: Alert/Oriented   Current Functional Status: Independent   Lives With spouse   Able to Return to Prior Arrangements yes   Is patient able to care for self after discharge? Yes   Patient's perception of discharge disposition home or selfcare   Readmission Within the Last 30 Days no previous admission in last 30 days   Patient currently being followed by outpatient case management? No   Patient currently receives any other outside agency services? No   Equipment Currently Used at Home none   Do you have any problems affording any of your prescribed medications? No   Is the patient taking medications as prescribed? yes   Does the patient have transportation home? Yes   Does the patient receive services at the Coumadin Clinic? No   Discharge Plan A Home   DME Needed Upon Discharge  none   Patient/Family in Agreement with Plan yes

## 2020-01-10 NOTE — PLAN OF CARE
01/09/20 1907   PRE-TX-O2   O2 Device (Oxygen Therapy) room air   SpO2 (!) 94 %   Pulse Oximetry Type Intermittent   Pulse (!) 57   Resp 18

## 2020-01-10 NOTE — PLAN OF CARE
Hospital follow up scheduled with PCP. AVS updated. CM also requested follow up appt with GI from their nurse.     01/10/20 0900   Discharge Assessment   Assessment Type Discharge Planning Reassessment

## 2020-01-10 NOTE — PLAN OF CARE
01/10/20 1525   Final Note   Assessment Type Final Discharge Note   Anticipated Discharge Disposition Home   Hospital Follow Up  Appt(s) scheduled? Yes

## 2020-01-10 NOTE — NURSING
Discharge instructions given to patient.  Instructed on medication regimen and f/u appoint.  He v/u.  Iv dc'd.  Cath intact. To be discharged home- waiting on ride.

## 2020-01-11 NOTE — HOSPITAL COURSE
Patient monitor closely during observation stay.  He was initiated on IV fluids and pain control.  He was NPO for ERCP on 01/09/2020.  ERCP demonstrated status post ERCP with balloon sweep and stent placement.  CBC and CMP monitor.  His LFTs are slightly elevated as expected and Dr. Medrano is aware.  Patient is tolerating diet and is stable for discharge from a GI standpoint.      Physical exam  Abdomen mild epigastric tenderness although improved denies nausea vomiting and is tolerating diet.

## 2020-01-11 NOTE — DISCHARGE SUMMARY
"[facility] Medicine  Discharge Summary      Patient Name: Ad Lyons  MRN: [id number]  Admission Date: 1/8/2020  Hospital Length of Stay: 0 days  Discharge Date and Time: 1/10/2020  1:00 PM  Attending Physician: No att. providers found   Discharging Provider: Yamileth Jeronimo NP  Primary Care Provider: Jimmie Carbajal MD      HPI:   Ad Lyons is a 64 y.o. male with Meniere's disease and drake's esophagus who presents to the ED for evaluation of acute onset of right upper abdominal pain  3 days ago associated with nausea and vomiting which he attributes to chronic vertigo. He states RUQ pain was worse this am prompting medical attention. He states pain was 8/10 "like punched in stomach". He did not take anything for pain relief and nothing alleviated pain. He reports loose stools but no diarrhea. Denies blood in stool.     In the ED he underwent CT abdomen renal stone protocol which showed Small amounts of air within the biliary tree possibly due to prior sphincteroplasty. A MRCP was obtained and showed Prior cholecystectomy.  A 9 mm stone is identified in the distal common bile duct with common bile duct dilation to 1.5 cm.  Smaller filling defects higher in the common duct may represent debris or smaller stones. GI notified on results and plan for ERCP with dilation tomorrow.     Procedure(s) (LRB):  ERCP (ENDOSCOPIC RETROGRADE CHOLANGIOPANCREATOGRAPHY) (N/A)      Hospital Course:   Patient monitor closely during observation stay.  He was initiated on IV fluids and pain control.  He was NPO for ERCP on 01/09/2020.  ERCP demonstrated status post ERCP with balloon sweep and stent placement.  CBC and CMP monitor.  His LFTs are slightly elevated as expected and Dr. Medrano is aware.  Patient is tolerating diet and is stable for discharge from a GI standpoint.      Physical exam  Abdomen mild epigastric tenderness although improved denies nausea vomiting and is tolerating " diet.     Consults:     No new Assessment & Plan notes have been filed under this hospital service since the last note was generated.  Service: Hospital Medicine    Final Active Diagnoses:    Diagnosis Date Noted POA    PRINCIPAL PROBLEM:  Choledocholithiasis [K80.50] 01/08/2020 Yes    Essential hypertension [I10] 01/08/2020 Yes    Sensorineural hearing loss (SNHL) of both ears [H90.3] 01/24/2018 Yes    Gastroesophageal reflux disease [K21.9] 03/28/2016 Yes      Problems Resolved During this Admission:       Discharged Condition: stable    Disposition: Home or Self Care    Follow Up:  Follow-up Information     Adrian Castillo MD In 4 weeks.    Specialty:  Gastroenterology  Contact information:  [facility] [facility]  [address]  [facility] [address]  [phone]                 Patient Instructions:      Diet Adult Regular     Notify your health care provider if you experience any of the following:  persistent dizziness, light-headedness, or visual disturbances     Notify your health care provider if you experience any of the following:  worsening rash     Notify your health care provider if you experience any of the following:  severe persistent headache     Notify your health care provider if you experience any of the following:  difficulty breathing or increased cough     Notify your health care provider if you experience any of the following:  redness, tenderness, or signs of infection (pain, swelling, redness, odor or green/yellow discharge around incision site)     Notify your health care provider if you experience any of the following:  severe uncontrolled pain     Notify your health care provider if you experience any of the following:  persistent nausea and vomiting or diarrhea     Notify your health care provider if you experience any of the following:  temperature >100.4     Activity as tolerated       Significant Diagnostic Studies: Labs:   BMP:   Recent Labs   Lab 01/10/20  0524         K 4.2       CO2 24   BUN 13   CREATININE 0.8   CALCIUM 8.7   , CMP   Recent Labs   Lab 01/10/20  0524      K 4.2      CO2 24      BUN 13   CREATININE 0.8   CALCIUM 8.7   PROT 6.4   ALBUMIN 3.6   BILITOT 1.4*   ALKPHOS 93   *   *   ANIONGAP 8   ESTGFRAFRICA >60   EGFRNONAA >60    and CBC   Recent Labs   Lab 01/10/20  0524   WBC 7.29   HGB 14.0   HCT 41.8          Pending Diagnostic Studies:     Procedure Component Value Units Date/Time    EKG 12-lead [[id number]]     Order Status:  Sent Lab Status:  No result          Medications:  Reconciled Home Medications:      Medication List      CHANGE how you take these medications    azelastine 137 mcg (0.1 %) nasal spray  Commonly known as:  ASTELIN  1 spray (137 mcg total) by Nasal route 2 (two) times daily.  What changed:    · when to take this  · reasons to take this     fluticasone propionate 50 mcg/actuation nasal spray  Commonly known as:  FLONASE  2 sprays (100 mcg total) by Each Nare route Daily.  What changed:    · when to take this  · reasons to take this     ondansetron 8 MG Tbdl  Commonly known as:  ZOFRAN-ODT  Take 1 tablet (8 mg total) by mouth every 8 (eight) hours.  What changed:    · when to take this  · reasons to take this     sucralfate 1 gram tablet  Commonly known as:  CARAFATE  Take 1 tablet (1 g total) by mouth 4 (four) times daily as needed. Dissolve pill in 2 tablespoons water and drink  What changed:  when to take this        CONTINUE taking these medications    chlorhexidine 0.12 % solution  Commonly known as:  PERIDEX  RINSE WITH 1/2 OZ. ONCE DAILY FOR 5 DAYS     cholestyramine (with sugar) 4 gram Powd  3 scoops per day for diarrhea after gallbladder surgery     IMODIUM A-D ORAL  Take 2 tablets by mouth daily as needed (diarrhea).     meclizine 25 mg tablet  Commonly known as:  ANTIVERT  Take 1 tablet (25 mg total) by mouth 3 (three) times daily as needed for Dizziness.     omeprazole 40 MG capsule  Commonly  known as:  PRILOSEC  Take 1 capsule (40 mg total) by mouth 2 (two) times daily before meals.     triamterene-hydrochlorothiazide 37.5-25 mg 37.5-25 mg per capsule  Commonly known as:  DYAZIDE  1/2 to 1 tablet every day during Meniere's episode     Vitamin B-12 1,000 mcg Subl  Generic drug:  cyanocobalamin (vitamin B-12)  Place 1 tablet under the tongue once daily.            Indwelling Lines/Drains at time of discharge:   Lines/Drains/Airways     None                 Time spent on the discharge of patient: 60 minutes  Patient was seen and examined on the date of discharge and determined to be suitable for discharge.         Yamileth Jeronimo NP  Department of Hospital Medicine  [facility]

## 2020-01-17 ENCOUNTER — LAB VISIT (OUTPATIENT)
Dept: LAB | Facility: HOSPITAL | Age: 65
End: 2020-01-17
Attending: PHYSICIAN ASSISTANT
Payer: MEDICARE

## 2020-01-17 ENCOUNTER — DOCUMENTATION ONLY (OUTPATIENT)
Dept: FAMILY MEDICINE | Facility: CLINIC | Age: 65
End: 2020-01-17

## 2020-01-17 ENCOUNTER — OFFICE VISIT (OUTPATIENT)
Dept: FAMILY MEDICINE | Facility: CLINIC | Age: 65
End: 2020-01-17
Payer: MEDICARE

## 2020-01-17 VITALS
HEART RATE: 66 BPM | WEIGHT: 223.56 LBS | BODY MASS INDEX: 28.69 KG/M2 | HEIGHT: 74 IN | TEMPERATURE: 98 F | DIASTOLIC BLOOD PRESSURE: 66 MMHG | SYSTOLIC BLOOD PRESSURE: 116 MMHG | OXYGEN SATURATION: 96 %

## 2020-01-17 DIAGNOSIS — H81.03 MENIERE'S DISEASE, BILATERAL: ICD-10-CM

## 2020-01-17 DIAGNOSIS — Z09 HOSPITAL DISCHARGE FOLLOW-UP: ICD-10-CM

## 2020-01-17 DIAGNOSIS — Z09 HOSPITAL DISCHARGE FOLLOW-UP: Primary | ICD-10-CM

## 2020-01-17 DIAGNOSIS — K80.50 CHOLEDOCHOLITHIASIS: ICD-10-CM

## 2020-01-17 LAB
ALBUMIN SERPL BCP-MCNC: 4 G/DL (ref 3.5–5.2)
ALP SERPL-CCNC: 73 U/L (ref 55–135)
ALT SERPL W/O P-5'-P-CCNC: 44 U/L (ref 10–44)
ANION GAP SERPL CALC-SCNC: 8 MMOL/L (ref 8–16)
AST SERPL-CCNC: 16 U/L (ref 10–40)
BASOPHILS # BLD AUTO: 0.04 K/UL (ref 0–0.2)
BASOPHILS NFR BLD: 0.6 % (ref 0–1.9)
BILIRUB SERPL-MCNC: 0.6 MG/DL (ref 0.1–1)
BUN SERPL-MCNC: 14 MG/DL (ref 8–23)
CALCIUM SERPL-MCNC: 9 MG/DL (ref 8.7–10.5)
CHLORIDE SERPL-SCNC: 107 MMOL/L (ref 95–110)
CO2 SERPL-SCNC: 26 MMOL/L (ref 23–29)
CREAT SERPL-MCNC: 0.9 MG/DL (ref 0.5–1.4)
DIFFERENTIAL METHOD: NORMAL
EOSINOPHIL # BLD AUTO: 0.3 K/UL (ref 0–0.5)
EOSINOPHIL NFR BLD: 4.9 % (ref 0–8)
ERYTHROCYTE [DISTWIDTH] IN BLOOD BY AUTOMATED COUNT: 13.4 % (ref 11.5–14.5)
EST. GFR  (AFRICAN AMERICAN): >60 ML/MIN/1.73 M^2
EST. GFR  (NON AFRICAN AMERICAN): >60 ML/MIN/1.73 M^2
GLUCOSE SERPL-MCNC: 95 MG/DL (ref 70–110)
HCT VFR BLD AUTO: 44.3 % (ref 40–54)
HGB BLD-MCNC: 14.2 G/DL (ref 14–18)
IMM GRANULOCYTES # BLD AUTO: 0.02 K/UL (ref 0–0.04)
IMM GRANULOCYTES NFR BLD AUTO: 0.3 % (ref 0–0.5)
LIPASE SERPL-CCNC: 32 U/L (ref 4–60)
LYMPHOCYTES # BLD AUTO: 2.3 K/UL (ref 1–4.8)
LYMPHOCYTES NFR BLD: 32.4 % (ref 18–48)
MCH RBC QN AUTO: 29.6 PG (ref 27–31)
MCHC RBC AUTO-ENTMCNC: 32.1 G/DL (ref 32–36)
MCV RBC AUTO: 92 FL (ref 82–98)
MONOCYTES # BLD AUTO: 0.4 K/UL (ref 0.3–1)
MONOCYTES NFR BLD: 5.8 % (ref 4–15)
NEUTROPHILS # BLD AUTO: 3.9 K/UL (ref 1.8–7.7)
NEUTROPHILS NFR BLD: 56 % (ref 38–73)
NRBC BLD-RTO: 0 /100 WBC
PLATELET # BLD AUTO: 271 K/UL (ref 150–350)
PMV BLD AUTO: 10.2 FL (ref 9.2–12.9)
POTASSIUM SERPL-SCNC: 4.3 MMOL/L (ref 3.5–5.1)
PROT SERPL-MCNC: 7.1 G/DL (ref 6–8.4)
RBC # BLD AUTO: 4.8 M/UL (ref 4.6–6.2)
SODIUM SERPL-SCNC: 141 MMOL/L (ref 136–145)
WBC # BLD AUTO: 6.94 K/UL (ref 3.9–12.7)

## 2020-01-17 PROCEDURE — 99999 PR PBB SHADOW E&M-EST. PATIENT-LVL IV: CPT | Mod: PBBFAC,,, | Performed by: PHYSICIAN ASSISTANT

## 2020-01-17 PROCEDURE — 36415 COLL VENOUS BLD VENIPUNCTURE: CPT | Mod: PO

## 2020-01-17 PROCEDURE — 1159F PR MEDICATION LIST DOCUMENTED IN MEDICAL RECORD: ICD-10-PCS | Mod: S$GLB,,, | Performed by: PHYSICIAN ASSISTANT

## 2020-01-17 PROCEDURE — 83690 ASSAY OF LIPASE: CPT

## 2020-01-17 PROCEDURE — 99999 PR PBB SHADOW E&M-EST. PATIENT-LVL IV: ICD-10-PCS | Mod: PBBFAC,,, | Performed by: PHYSICIAN ASSISTANT

## 2020-01-17 PROCEDURE — 85025 COMPLETE CBC W/AUTO DIFF WBC: CPT

## 2020-01-17 PROCEDURE — 1125F AMNT PAIN NOTED PAIN PRSNT: CPT | Mod: S$GLB,,, | Performed by: PHYSICIAN ASSISTANT

## 2020-01-17 PROCEDURE — 99214 OFFICE O/P EST MOD 30 MIN: CPT | Mod: S$GLB,,, | Performed by: PHYSICIAN ASSISTANT

## 2020-01-17 PROCEDURE — 1159F MED LIST DOCD IN RCRD: CPT | Mod: S$GLB,,, | Performed by: PHYSICIAN ASSISTANT

## 2020-01-17 PROCEDURE — 1101F PR PT FALLS ASSESS DOC 0-1 FALLS W/OUT INJ PAST YR: ICD-10-PCS | Mod: S$GLB,,, | Performed by: PHYSICIAN ASSISTANT

## 2020-01-17 PROCEDURE — 80053 COMPREHEN METABOLIC PANEL: CPT

## 2020-01-17 PROCEDURE — 1125F PR PAIN SEVERITY QUANTIFIED, PAIN PRESENT: ICD-10-PCS | Mod: S$GLB,,, | Performed by: PHYSICIAN ASSISTANT

## 2020-01-17 PROCEDURE — 1101F PT FALLS ASSESS-DOCD LE1/YR: CPT | Mod: S$GLB,,, | Performed by: PHYSICIAN ASSISTANT

## 2020-01-17 PROCEDURE — 99214 PR OFFICE/OUTPT VISIT, EST, LEVL IV, 30-39 MIN: ICD-10-PCS | Mod: S$GLB,,, | Performed by: PHYSICIAN ASSISTANT

## 2020-01-17 NOTE — PROGRESS NOTES
Pre-Visit Chart Review  For Appointment Scheduled on (1/17/20)    Health Maintenance Due   Topic Date Due    TETANUS VACCINE  01/15/1972    High Dose Statin  01/15/1975

## 2020-01-17 NOTE — PROGRESS NOTES
Subjective:       Patient ID: Ad Lyons is a 65 y.o. male.    Chief Complaint: Hospital Follow Up (Dizzy,) and Abdominal Pain (Right Side (1 month))    Patient is new to me.  Patient presents for hospital follow-up.  Patient was hospitalized for choledocholithiasis after having CT scan in the emergency room that demonstrated small amounts of air in the biliary tree and having MRCP that showed 9 mm stone in the distal common bile duct with common bile duct dilatation of 1.5 cm.  He was initiated on IV fluids and pain control.  Patient was seen by GI and underwent ERCP with balloon sweep and stent placement.  Patient's pain improved over the course of the hospitalization.  The remainder of the hospital course was uneventful.  Since discharge the patient continues to have right upper quadrant and left upper quadrant abdominal pain. He states that the pain is significantly improved from hospitalization.  He denies nausea or vomiting.  He has been afebrile.  He is back at work without any limitations.  Patient has chronic diarrhea which has been unchanged.  Patient has Meniere's disease with chronic dizziness for which she is on Antivert and hydrochlorothiazide.  He states typically his flares of Meniere's only last 1 week.  This current flare has been ongoing now for 2 weeks.  He is scheduled to see ENT.  Patients patient medical/surgical, social and family histories have been reviewed       Review of Systems   Constitutional: Negative for activity change, appetite change, chills, diaphoresis, fatigue, fever and unexpected weight change.   HENT: Negative for voice change.    Respiratory: Negative for cough, chest tightness and shortness of breath.    Cardiovascular: Negative for chest pain, palpitations and leg swelling.   Gastrointestinal: Positive for abdominal pain (improved ) and diarrhea (chronic ). Negative for abdominal distention, anal bleeding, blood in stool, constipation, nausea and vomiting.    Genitourinary: Negative for difficulty urinating, frequency, hematuria and urgency.   Skin: Negative for rash.   Neurological: Positive for dizziness (chronic ). Negative for weakness, light-headedness and headaches.       Objective:      Physical Exam   Constitutional: He appears well-developed and well-nourished. He is cooperative. No distress.   HENT:   Head: Normocephalic and atraumatic.   Cardiovascular: Normal rate, regular rhythm and normal heart sounds.   Pulmonary/Chest: Effort normal and breath sounds normal.   Abdominal: Soft. Normal appearance and bowel sounds are normal. He exhibits no distension. There is no hepatosplenomegaly. There is tenderness in the right upper quadrant and left upper quadrant. There is no rigidity, no rebound, no guarding and no CVA tenderness.   Musculoskeletal:        Right lower leg: He exhibits no edema.        Left lower leg: He exhibits no edema.   Neurological: He is alert.   Skin: Skin is warm and dry. Capillary refill takes less than 2 seconds.       Assessment:       1. Hospital discharge follow-up    2. Choledocholithiasis    3. Meniere's disease, bilateral        Plan:       Ad was seen today for hospital follow up and abdominal pain.    Diagnoses and all orders for this visit:    Hospital discharge follow-up  -     Comprehensive metabolic panel; Future  -     CBC auto differential; Future  -     Lipase; Future    Choledocholithiasis  -     CBC auto differential; Future  -     Lipase; Future  GI as scheduled   Meniere's disease, bilateral  -   ENT as scheduled   Antivert, HCTZ        Follow up for lab today , pcp as scheduled .

## 2020-01-27 ENCOUNTER — PATIENT OUTREACH (OUTPATIENT)
Dept: ADMINISTRATIVE | Facility: OTHER | Age: 65
End: 2020-01-27

## 2020-01-28 ENCOUNTER — TELEPHONE (OUTPATIENT)
Dept: OTOLARYNGOLOGY | Facility: CLINIC | Age: 65
End: 2020-01-28

## 2020-01-28 ENCOUNTER — OFFICE VISIT (OUTPATIENT)
Dept: OTOLARYNGOLOGY | Facility: CLINIC | Age: 65
End: 2020-01-28
Payer: MEDICARE

## 2020-01-28 ENCOUNTER — CLINICAL SUPPORT (OUTPATIENT)
Dept: AUDIOLOGY | Facility: CLINIC | Age: 65
End: 2020-01-28
Payer: MEDICARE

## 2020-01-28 VITALS — BODY MASS INDEX: 28.69 KG/M2 | HEIGHT: 74 IN | WEIGHT: 223.56 LBS

## 2020-01-28 DIAGNOSIS — H90.A22 SENSORINEURAL HEARING LOSS (SNHL) OF LEFT EAR WITH RESTRICTED HEARING OF RIGHT EAR: Primary | ICD-10-CM

## 2020-01-28 DIAGNOSIS — H81.02: ICD-10-CM

## 2020-01-28 DIAGNOSIS — R42 VERTIGO: ICD-10-CM

## 2020-01-28 DIAGNOSIS — H93.13 TINNITUS AURIUM, BILATERAL: ICD-10-CM

## 2020-01-28 PROCEDURE — 99999 PR PBB SHADOW E&M-EST. PATIENT-LVL III: ICD-10-PCS | Mod: PBBFAC,,, | Performed by: OTOLARYNGOLOGY

## 2020-01-28 PROCEDURE — 99214 PR OFFICE/OUTPT VISIT, EST, LEVL IV, 30-39 MIN: ICD-10-PCS | Mod: S$GLB,,, | Performed by: OTOLARYNGOLOGY

## 2020-01-28 PROCEDURE — 92550 TYMPANOMETRY & REFLEX THRESH: CPT | Mod: S$GLB,,, | Performed by: AUDIOLOGIST

## 2020-01-28 PROCEDURE — 99214 OFFICE O/P EST MOD 30 MIN: CPT | Mod: S$GLB,,, | Performed by: OTOLARYNGOLOGY

## 2020-01-28 PROCEDURE — 99999 PR PBB SHADOW E&M-EST. PATIENT-LVL III: CPT | Mod: PBBFAC,,, | Performed by: OTOLARYNGOLOGY

## 2020-01-28 PROCEDURE — 1159F MED LIST DOCD IN RCRD: CPT | Mod: S$GLB,,, | Performed by: OTOLARYNGOLOGY

## 2020-01-28 PROCEDURE — 1101F PR PT FALLS ASSESS DOC 0-1 FALLS W/OUT INJ PAST YR: ICD-10-PCS | Mod: S$GLB,,, | Performed by: OTOLARYNGOLOGY

## 2020-01-28 PROCEDURE — 92550 PR TYMPANOMETRY AND REFLEX THRESHOLD MEASUREMENTS: ICD-10-PCS | Mod: S$GLB,,, | Performed by: AUDIOLOGIST

## 2020-01-28 PROCEDURE — 1101F PT FALLS ASSESS-DOCD LE1/YR: CPT | Mod: S$GLB,,, | Performed by: OTOLARYNGOLOGY

## 2020-01-28 PROCEDURE — 1159F PR MEDICATION LIST DOCUMENTED IN MEDICAL RECORD: ICD-10-PCS | Mod: S$GLB,,, | Performed by: OTOLARYNGOLOGY

## 2020-01-28 PROCEDURE — 1126F AMNT PAIN NOTED NONE PRSNT: CPT | Mod: S$GLB,,, | Performed by: OTOLARYNGOLOGY

## 2020-01-28 PROCEDURE — 1126F PR PAIN SEVERITY QUANTIFIED, NO PAIN PRESENT: ICD-10-PCS | Mod: S$GLB,,, | Performed by: OTOLARYNGOLOGY

## 2020-01-28 PROCEDURE — 92557 PR COMPREHENSIVE HEARING TEST: ICD-10-PCS | Mod: S$GLB,,, | Performed by: AUDIOLOGIST

## 2020-01-28 PROCEDURE — 92557 COMPREHENSIVE HEARING TEST: CPT | Mod: S$GLB,,, | Performed by: AUDIOLOGIST

## 2020-01-28 NOTE — LETTER
January 29, 2020      Madelyn Whitney, APRN  [address] [address]           [facility] - Otorhinolaryngology  [address]  [place] [address]  Phone: [phone]  Fax: [phone]          Patient: Ad Lyons   MR Number: [id number]   YOB: 1955   Date of Visit: 1/28/2020       Dear Madelyn Whitney:    Thank you for referring Ad Lyons to me for evaluation. Attached you will find relevant portions of my assessment and plan of care.    If you have questions, please do not hesitate to call me. I look forward to following Ad Lyons along with you.    Sincerely,    Aris Miller MD    Enclosure  CC:  No Recipients    If you would like to receive this communication electronically, please contact [email or web address] or [phone] to request more information on [employer] access.    For providers and/or their staff who would like to refer a patient to [facility], please contact us through our one-stop-shop provider referral line, [facility] [place] operated [facility], at [phone].    If you feel you have received this communication in error or would no longer like to receive these types of communications, please e-mail [email or web address]

## 2020-01-28 NOTE — PROGRESS NOTES
Ad Lyons was seen today for a hearing evaluation.     Pure tone audiometry revealed a moderate SNHL for the right ear;  profound SNHL for the left ear  SRT and PTA are in good agreement, AD. CNT for left ear due to severity of hearing loss  Fair speech discrimination for the right ear: CNT for left ear due to severity of hearing loss  Tympanometry revealed Type A, bilaterally    Ipsilateral Acoustic Reflexes were: absent at 1000 and 2000 Hz., AU    Recommendations:  1. Otologic Evaluation  2. Annual Audiogram or repeat audiogram as needed  3. Hearing Protection  4. Continue use of amplification in the right ear. Possible CI consult

## 2020-01-29 NOTE — PROGRESS NOTES
Subjective:       Patient ID: Ad Lyons is a 65 y.o. male.    Chief Complaint: Dizziness and Hearing Loss    HPI     Ad Lyons is a 65 y.o. male with history of left-sided Meniere's disease presents for evaluation.  His main complaints at this time are bouts of vertigo as well as his hearing loss.  He reports he was initially diagnosed with Meniere's disease about 3 years ago when he developed left-sided sudden hearing loss associated with vertigo.  His vertigo episodes are characterized by severe episodes that her incapacitating resulting in spinning.  These usually last several hours.  Currently he is experiencing episodes monthly.  He has been tried on Dyazide but states he is unable to tolerate taking it every day.  Right now he uses meclizine this a control was episodes.    He wants to know if in endolymphatic shunt would help his dizziness.  He currently wears hearing aids in both ears for his hearing loss.  His hearing loss is severe with complete loss on his left side and a moderate- severe loss on his right side.  He reports tinnitus of his left ear that is significant as well.      He is unsure if he ever had an MRI for his ears.     Review of Systems   Constitutional: Negative for chills and fever.   HENT: Positive for congestion, hearing loss, postnasal drip and tinnitus. Negative for sore throat and trouble swallowing.    Respiratory: Negative for apnea and chest tightness.    Cardiovascular: Negative for chest pain.   Gastrointestinal: Positive for abdominal pain, diarrhea and vomiting.   Musculoskeletal: Positive for neck pain.   Neurological: Positive for dizziness.       Objective:      Physical Exam   Constitutional: He appears well-developed and well-nourished.   HENT:   Head: Normocephalic and atraumatic.   Right Ear: Tympanic membrane, external ear and ear canal normal.   Left Ear: Tympanic membrane, external ear and ear canal normal.   Nose: Nose normal.   Mouth/Throat: Uvula is  midline, oropharynx is clear and moist and mucous membranes are normal.   Neck: Normal range of motion. No thyroid mass present.       Data Reviewed:      Audiogram tracings independently reviewed and discussed with patient shows left ear deafness with moderate to severe loss of right side    MRI report from 2017 found in legacy documents shows no evidence of IAC or CPA lesion.      VNG:     VNG results (abnormal results italicized):   Oculomotor function tests:              Sinusoidal tracking - abnormal but symmetric              Saccades - abnormal velocity; normal latency and accuracy              OPKs -  abnormal and asymmetric for 30 d/s;  Normal and symmetric for 20 d/s   Spontaneous nystagmus was absent.  Gaze nystagmus was absent.   Rockdale-Hallpike Left was negative for BPPV but yielded 10 d/s RB nystagmus with fixation.    Rockdale-Hallpike Right was negative for BPPV but yielded 3 d/s RB nystagmus with fixation.   Static Positional nystagmus was absent.  Bi-thermal caloric irrigations revealed a 100% caloric weakness in the left ear, which is outside of normal limits, and 4% directional preponderance to the right, which is within normal limits.  Fixation suppression following caloric irrigations were normal.  RC:      11 d/s                          RW:     12 d/s    d/s                            LW:      0 d/s      Impressions:  Abnormal VNG indicative of both central and peripheral abnormalities.  100% left ear vestibular weakness.  Negative for BPPV bilaterally.     Abnormal VNG results:  1. Abnormal oculomotor test performance for sinusoidal tracking, saccades and OPKs (30 d/s)  2. Positional nystagmus not consistent with traditional BPPV for Rockdale-Hallpike to both sides  3. 100% left ear caloric weakness       Assessment:       1. Sensorineural hearing loss (SNHL) of left ear with restricted hearing of right ear    2. Meniere's disease, vestibular, active, left    3. Vertigo    4. Tinnitus aurium,  bilateral        Plan:         in summary this is a 65-year-old male with left-sided Meniere's disease. He has severe sensorineural hearing loss with a profound loss of his left ear. His VNG shows 100% left-sided weakness.  He is interested in surgical options for treatment as he has not been tolerant to medical therapy.  I discussed the possibility of labyrinthectomy with him given his complete hearing loss and findings on VNG.  This would likely be a definitive treatment for his vertigo episodes from that side.  In addition he would benefit from cochlear implantation simultaneously.  We will have him undergo cochlear implant evaluation if he is deemed a candidate we will discuss these options further.    Follow-up after CI evaluation.

## 2020-02-03 ENCOUNTER — PATIENT OUTREACH (OUTPATIENT)
Dept: ADMINISTRATIVE | Facility: OTHER | Age: 65
End: 2020-02-03

## 2020-02-05 ENCOUNTER — OFFICE VISIT (OUTPATIENT)
Dept: GASTROENTEROLOGY | Facility: CLINIC | Age: 65
End: 2020-02-05
Payer: MEDICARE

## 2020-02-05 VITALS
SYSTOLIC BLOOD PRESSURE: 116 MMHG | BODY MASS INDEX: 29.49 KG/M2 | HEART RATE: 67 BPM | DIASTOLIC BLOOD PRESSURE: 78 MMHG | WEIGHT: 229.75 LBS

## 2020-02-05 DIAGNOSIS — K80.50 CHOLEDOCHOLITHIASIS: ICD-10-CM

## 2020-02-05 DIAGNOSIS — K22.70 BARRETT'S ESOPHAGUS WITHOUT DYSPLASIA: Primary | ICD-10-CM

## 2020-02-05 DIAGNOSIS — Z86.010 HX OF COLONIC POLYPS: ICD-10-CM

## 2020-02-05 DIAGNOSIS — K21.9 GASTROESOPHAGEAL REFLUX DISEASE WITHOUT ESOPHAGITIS: ICD-10-CM

## 2020-02-05 PROCEDURE — 99999 PR PBB SHADOW E&M-EST. PATIENT-LVL III: CPT | Mod: PBBFAC,,, | Performed by: INTERNAL MEDICINE

## 2020-02-05 PROCEDURE — 1159F MED LIST DOCD IN RCRD: CPT | Mod: S$GLB,,, | Performed by: INTERNAL MEDICINE

## 2020-02-05 PROCEDURE — 1101F PT FALLS ASSESS-DOCD LE1/YR: CPT | Mod: S$GLB,,, | Performed by: INTERNAL MEDICINE

## 2020-02-05 PROCEDURE — 99214 PR OFFICE/OUTPT VISIT, EST, LEVL IV, 30-39 MIN: ICD-10-PCS | Mod: S$GLB,,, | Performed by: INTERNAL MEDICINE

## 2020-02-05 PROCEDURE — 1125F AMNT PAIN NOTED PAIN PRSNT: CPT | Mod: S$GLB,,, | Performed by: INTERNAL MEDICINE

## 2020-02-05 PROCEDURE — 1101F PR PT FALLS ASSESS DOC 0-1 FALLS W/OUT INJ PAST YR: ICD-10-PCS | Mod: S$GLB,,, | Performed by: INTERNAL MEDICINE

## 2020-02-05 PROCEDURE — 99214 OFFICE O/P EST MOD 30 MIN: CPT | Mod: S$GLB,,, | Performed by: INTERNAL MEDICINE

## 2020-02-05 PROCEDURE — 1125F PR PAIN SEVERITY QUANTIFIED, PAIN PRESENT: ICD-10-PCS | Mod: S$GLB,,, | Performed by: INTERNAL MEDICINE

## 2020-02-05 PROCEDURE — 99999 PR PBB SHADOW E&M-EST. PATIENT-LVL III: ICD-10-PCS | Mod: PBBFAC,,, | Performed by: INTERNAL MEDICINE

## 2020-02-05 PROCEDURE — 1159F PR MEDICATION LIST DOCUMENTED IN MEDICAL RECORD: ICD-10-PCS | Mod: S$GLB,,, | Performed by: INTERNAL MEDICINE

## 2020-02-05 NOTE — H&P (VIEW-ONLY)
Subjective:       Patient ID: Ad Lyons is a 65 y.o. male.    This is an established patient.      Chief Complaint: Choledocholithiasis    Patient seen for follow up of choledocholithiasis, recent onset, evaluated as inpatient, presenting with elevated LFTs, now resolved.  Patient has stent indwelling.  He denies any abdominal pain, N/V, bowel problems.  He has a history of Mills's esophagus without dysplasia and is due for surveillance of this.  No other complaints currently.    Review of Systems   Constitutional: Negative for chills, fatigue and fever.   HENT: Negative for trouble swallowing.    Respiratory: Negative for cough, shortness of breath and wheezing.    Cardiovascular: Negative for chest pain and palpitations.   Gastrointestinal: Negative for abdominal pain, constipation, diarrhea, nausea and vomiting.   Musculoskeletal: Negative for arthralgias and myalgias.   Skin: Negative for color change and rash.   All other systems reviewed and are negative.      Objective:         Vitals:    02/05/20 1344   BP: 116/78   Pulse: 67   Weight: 104.2 kg (229 lb 11.5 oz)       Physical Exam   Constitutional: He is oriented to person, place, and time. He appears well-developed and well-nourished.   HENT:   Head: Normocephalic and atraumatic.   Eyes: Pupils are equal, round, and reactive to light. No scleral icterus.   Cardiovascular: Normal rate and regular rhythm.   No murmur heard.  Pulmonary/Chest: Effort normal and breath sounds normal. He has no wheezes.   Abdominal: Soft. Bowel sounds are normal. He exhibits no distension. There is no tenderness.   Neurological: He is alert and oriented to person, place, and time.          Lab Results   Component Value Date    WBC 6.94 01/17/2020    HGB 14.2 01/17/2020    HCT 44.3 01/17/2020    MCV 92 01/17/2020     01/17/2020         CMP  Sodium   Date Value Ref Range Status   01/17/2020 141 136 - 145 mmol/L Final     Potassium   Date Value Ref Range Status    01/17/2020 4.3 3.5 - 5.1 mmol/L Final     Chloride   Date Value Ref Range Status   01/17/2020 107 95 - 110 mmol/L Final     CO2   Date Value Ref Range Status   01/17/2020 26 23 - 29 mmol/L Final     Glucose   Date Value Ref Range Status   01/17/2020 95 70 - 110 mg/dL Final     BUN, Bld   Date Value Ref Range Status   01/17/2020 14 8 - 23 mg/dL Final     Creatinine   Date Value Ref Range Status   01/17/2020 0.9 0.5 - 1.4 mg/dL Final     Calcium   Date Value Ref Range Status   01/17/2020 9.0 8.7 - 10.5 mg/dL Final     Total Protein   Date Value Ref Range Status   01/17/2020 7.1 6.0 - 8.4 g/dL Final     Albumin   Date Value Ref Range Status   01/17/2020 4.0 3.5 - 5.2 g/dL Final     Total Bilirubin   Date Value Ref Range Status   01/17/2020 0.6 0.1 - 1.0 mg/dL Final     Comment:     For infants and newborns, interpretation of results should be based  on gestational age, weight and in agreement with clinical  observations.  Premature Infant recommended reference ranges:  Up to 24 hours.............<8.0 mg/dL  Up to 48 hours............<12.0 mg/dL  3-5 days..................<15.0 mg/dL  6-29 days.................<15.0 mg/dL       Alkaline Phosphatase   Date Value Ref Range Status   01/17/2020 73 55 - 135 U/L Final     AST   Date Value Ref Range Status   01/17/2020 16 10 - 40 U/L Final     ALT   Date Value Ref Range Status   01/17/2020 44 10 - 44 U/L Final     Anion Gap   Date Value Ref Range Status   01/17/2020 8 8 - 16 mmol/L Final     eGFR if    Date Value Ref Range Status   01/17/2020 >60.0 >60 mL/min/1.73 m^2 Final     eGFR if non    Date Value Ref Range Status   01/17/2020 >60.0 >60 mL/min/1.73 m^2 Final     Comment:     Calculation used to obtain the estimated glomerular filtration  rate (eGFR) is the CKD-EPI equation.            Assessment:       1. Mills's esophagus without dysplasia    2. Choledocholithiasis    3. Gastroesophageal reflux disease without esophagitis    4. Hx of  colonic polyps        Plan:       1.  EGD to survey Mills's esophagus  2.  ERCP to remove stent and sweep common bile duct  3.  Further recommendations to follow after above.

## 2020-02-06 ENCOUNTER — PATIENT MESSAGE (OUTPATIENT)
Dept: OTOLARYNGOLOGY | Facility: CLINIC | Age: 65
End: 2020-02-06

## 2020-02-09 ENCOUNTER — PATIENT OUTREACH (OUTPATIENT)
Dept: ADMINISTRATIVE | Facility: OTHER | Age: 65
End: 2020-02-09

## 2020-02-10 ENCOUNTER — CLINICAL SUPPORT (OUTPATIENT)
Dept: AUDIOLOGY | Facility: CLINIC | Age: 65
End: 2020-02-10
Payer: MEDICARE

## 2020-02-10 DIAGNOSIS — H93.13 TINNITUS OF BOTH EARS: ICD-10-CM

## 2020-02-10 DIAGNOSIS — R42 DIZZINESS: ICD-10-CM

## 2020-02-10 PROCEDURE — 99999 PR PBB SHADOW E&M-EST. PATIENT-LVL I: ICD-10-PCS | Mod: PBBFAC,,, | Performed by: AUDIOLOGIST

## 2020-02-10 PROCEDURE — 92626 PR EVAL, AUDITORY FUNCTION, SURG IMPLANT DEVICE, 1ST HR: ICD-10-PCS | Mod: S$GLB,,, | Performed by: AUDIOLOGIST

## 2020-02-10 PROCEDURE — 92626 EVAL AUD FUNCJ 1ST HOUR: CPT | Mod: S$GLB,,, | Performed by: AUDIOLOGIST

## 2020-02-10 PROCEDURE — 99999 PR PBB SHADOW E&M-EST. PATIENT-LVL I: CPT | Mod: PBBFAC,,, | Performed by: AUDIOLOGIST

## 2020-02-10 NOTE — PROGRESS NOTES
2/10/2020    Cochlear Implant Evaluation:    Ad Smith was seen at [facility] on 2/10/2020 for a cochlear implant evaluation.  According to Dr. Miller's notes, Ad Smith is a 65 y.o. male with history of left-sided Meniere's disease presents for evaluation.  His main complaints at this time are bouts of vertigo as well as his hearing loss.  Mr. Smith reported he was initially diagnosed with Meniere's disease about 3 years ago when he developed left-sided sudden hearing loss associated with vertigo.  His vertigo episodes are characterized by severe episodes that are incapacitating resulting in spinning.  The episodes usually last several hours and currently he is experiencing episodes monthly.  Mr. Smith had tried Dyazide but states he is unable to tolerate taking it every day.  Mr. Smith currently uses meclizine to control the episodes of dizziness.  Mr. Smith wears hearing aids in both ears for his hearing loss to improve communicative functioning.  His hearing loss is severe with complete loss on his left side and a moderate- severe loss on his right side.  He reports tinnitus of his left ear that is significant as well.   Mr. Smith stated the left hearing aid is no longer beneficial and he has significant difficulty with everyday communication.  He also reported difficulty with sound localization.    Pure tone threshold testing was completed at [facility] on 1/28/2020.  The results indicated a moderate to severe sensorineural hearing loss for the right ear and a profound sensorineural hearing loss for the left ear.  Aided testing was completed in the best aided condition for each ear.  Aided responses were obtained in the mild to moderate hearing loss range for the right ear and the severe to profound hearing loss range for the left ear.  AzBIO sentences were presented in the best aided condition at 60dBSPL.  Aided responses were obtained at 92% in quiet and  63% in noise (S/N+10) in the best aided condition.  There was no response (0%) for the left ear (ear to be implanted) only.  Mr. Lyons scored 88% on CNC words in the best aided condition at 60dBSPL.    Based on the results of this comprehensive auditory evaluation, Mr. Lyons does not meet the standard Medicare criteria for cochlear implantation at this time based on his scores on open set sentence cognition.  Limited benefit from amplification is defined by the test scores of ? 40% correct in the best-aided listening condition on tape recorded tests of open-set sentence cognition according to Medicare criteria.  There was no measurable benefit from traditional amplification in his left ear and significant hearing loss in his right ear, however his scores did not meet traditional Medicare criteria.  The benefits and limitations of cochlear implantation were discussed today including the range of possible outcomes.  The results of this evaluation will be reviewed with Dr. Miller.    Recommend:  1.  Otologic evaluation  2.  Hearing aid trial with bicros hearing aid.

## 2020-02-11 ENCOUNTER — OFFICE VISIT (OUTPATIENT)
Dept: OTOLARYNGOLOGY | Facility: CLINIC | Age: 65
End: 2020-02-11
Payer: MEDICARE

## 2020-02-11 ENCOUNTER — TELEPHONE (OUTPATIENT)
Dept: OTOLARYNGOLOGY | Facility: CLINIC | Age: 65
End: 2020-02-11

## 2020-02-11 VITALS — BODY MASS INDEX: 29.31 KG/M2 | WEIGHT: 228.38 LBS | HEIGHT: 74 IN

## 2020-02-11 DIAGNOSIS — H81.02: ICD-10-CM

## 2020-02-11 DIAGNOSIS — H90.A22 SENSORINEURAL HEARING LOSS (SNHL) OF LEFT EAR WITH RESTRICTED HEARING OF RIGHT EAR: Primary | ICD-10-CM

## 2020-02-11 PROCEDURE — 99214 PR OFFICE/OUTPT VISIT, EST, LEVL IV, 30-39 MIN: ICD-10-PCS | Mod: 57,S$GLB,, | Performed by: OTOLARYNGOLOGY

## 2020-02-11 PROCEDURE — 99999 PR PBB SHADOW E&M-EST. PATIENT-LVL III: CPT | Mod: PBBFAC,,, | Performed by: OTOLARYNGOLOGY

## 2020-02-11 PROCEDURE — 1159F PR MEDICATION LIST DOCUMENTED IN MEDICAL RECORD: ICD-10-PCS | Mod: S$GLB,,, | Performed by: OTOLARYNGOLOGY

## 2020-02-11 PROCEDURE — 1159F MED LIST DOCD IN RCRD: CPT | Mod: S$GLB,,, | Performed by: OTOLARYNGOLOGY

## 2020-02-11 PROCEDURE — 1126F AMNT PAIN NOTED NONE PRSNT: CPT | Mod: S$GLB,,, | Performed by: OTOLARYNGOLOGY

## 2020-02-11 PROCEDURE — 1101F PR PT FALLS ASSESS DOC 0-1 FALLS W/OUT INJ PAST YR: ICD-10-PCS | Mod: S$GLB,,, | Performed by: OTOLARYNGOLOGY

## 2020-02-11 PROCEDURE — 1101F PT FALLS ASSESS-DOCD LE1/YR: CPT | Mod: S$GLB,,, | Performed by: OTOLARYNGOLOGY

## 2020-02-11 PROCEDURE — 1126F PR PAIN SEVERITY QUANTIFIED, NO PAIN PRESENT: ICD-10-PCS | Mod: S$GLB,,, | Performed by: OTOLARYNGOLOGY

## 2020-02-11 PROCEDURE — 99999 PR PBB SHADOW E&M-EST. PATIENT-LVL III: ICD-10-PCS | Mod: PBBFAC,,, | Performed by: OTOLARYNGOLOGY

## 2020-02-11 PROCEDURE — 99214 OFFICE O/P EST MOD 30 MIN: CPT | Mod: 57,S$GLB,, | Performed by: OTOLARYNGOLOGY

## 2020-02-11 NOTE — PROGRESS NOTES
Subjective:       Patient ID: Ad Lyons is a 65 y.o. male.    Chief Complaint: Follow-up    2/11/2020: here today after f/u CI testing.  He reports continued hearing loss of the left ear that is profound and constant.  He also has significant hearing loss in his right ear.  He has extreme difficulty in localizing sounds and hearing in noise.  He also continues to suffer from bouts of vertigo occurring about once a month.      HPI:    Ad Lyons is a 65 y.o. male with history of left-sided Meniere's disease presents for evaluation.  His main complaints at this time are bouts of vertigo as well as his hearing loss.  He reports he was initially diagnosed with Meniere's disease about 3 years ago when he developed left-sided sudden hearing loss associated with vertigo.  His vertigo episodes are characterized by severe episodes that her incapacitating resulting in spinning.  These usually last several hours.  Currently he is experiencing episodes monthly.  He has been tried on Dyazide but states he is unable to tolerate taking it every day.  Right now he uses meclizine this a control was episodes.    He wants to know if in endolymphatic shunt would help his dizziness.  He currently wears hearing aids in both ears for his hearing loss.  His hearing loss is severe with complete loss on his left side and a moderate- severe loss on his right side.  He reports tinnitus of his left ear that is significant as well.      He is unsure if he ever had an MRI for his ears.     Review of Systems   Constitutional: Negative for chills and fever.   HENT: Positive for congestion, hearing loss, postnasal drip and tinnitus. Negative for sore throat and trouble swallowing.    Respiratory: Negative for apnea and chest tightness.    Cardiovascular: Negative for chest pain.   Gastrointestinal: Positive for abdominal pain, diarrhea and vomiting.   Musculoskeletal: Positive for neck pain.   Neurological: Positive for dizziness.        Objective:      Physical Exam   Constitutional: He appears well-developed and well-nourished.   HENT:   Head: Normocephalic and atraumatic.   Right Ear: Tympanic membrane, external ear and ear canal normal.   Left Ear: Tympanic membrane, external ear and ear canal normal.   Nose: Nose normal.   Mouth/Throat: Uvula is midline, oropharynx is clear and moist and mucous membranes are normal.   Neck: Normal range of motion. No thyroid mass present.       Data Reviewed:      Audiogram tracings independently reviewed and discussed with patient shows left ear deafness with moderate to severe loss of right side    MRI report from 2016 found in legacy documents shows no evidence of IAC or CPA lesion.      VNG:     VNG results (abnormal results italicized):   Oculomotor function tests:              Sinusoidal tracking - abnormal but symmetric              Saccades - abnormal velocity; normal latency and accuracy              OPKs -  abnormal and asymmetric for 30 d/s;  Normal and symmetric for 20 d/s   Spontaneous nystagmus was absent.  Gaze nystagmus was absent.   Jhon-Hallpike Left was negative for BPPV but yielded 10 d/s RB nystagmus with fixation.    Allen-Hallpike Right was negative for BPPV but yielded 3 d/s RB nystagmus with fixation.   Static Positional nystagmus was absent.  Bi-thermal caloric irrigations revealed a 100% caloric weakness in the left ear, which is outside of normal limits, and 4% directional preponderance to the right, which is within normal limits.  Fixation suppression following caloric irrigations were normal.  RC:      11 d/s                          RW:     12 d/s    d/s                            LW:      0 d/s      Impressions:  Abnormal VNG indicative of both central and peripheral abnormalities.  100% left ear vestibular weakness.  Negative for BPPV bilaterally.     Abnormal VNG results:  1. Abnormal oculomotor test performance for sinusoidal tracking, saccades and OPKs (30 d/s)  2.  Positional nystagmus not consistent with traditional BPPV for Carroll-Hallpike to both sides  3. 100% left ear caloric weakness       Assessment:       1. Sensorineural hearing loss (SNHL) of left ear with restricted hearing of right ear    2. Meniere's disease, vestibular, active, left        Plan:         in summary this is a 65-year-old male with left-sided Meniere's disease. He has severe sensorineural hearing loss with a profound loss of his left ear. His VNG shows 100% left-sided weakness.  He is interested in surgical options for treatment as he has not been tolerant to medical therapy.  I discussed the possibility of labyrinthectomy with him given his complete hearing loss and findings on VNG.    I feel that he would significantly benefit from cochlear implantation despite not meeting medicare criteria for CI based on testing of both ears.  He has a profound hearing loss in his left ear and is unable to benefit from a hearing aid in this ear.  He has meniere's disease which is a progressive hearing loss condition that can affect both ears.  He is a candidate for labyrinthectomy, which may cause ossification of his cochlea and would prevent cochlear implantation years later in that ear.  This may limit his options for hearing rehabilitation if his hearing progresses in the future, if he cannot undergo an implant to his left ear.  He also has significant tinnitus which can be improved with CI and in addition he has significant difficulty localizing sound and hearing in noise.  Both of which have shown improvement when treating patient with cochlear implantation in single sided deafness.        We will tentatively plan for LEFT CI with labyrinthectomy pending approval on 3/4/2020    CT temporal bone ordered  He has already completed immunizations for PCV 13 and 23

## 2020-02-12 ENCOUNTER — PATIENT MESSAGE (OUTPATIENT)
Dept: SURGERY | Facility: HOSPITAL | Age: 65
End: 2020-02-12

## 2020-02-13 ENCOUNTER — DOCUMENTATION ONLY (OUTPATIENT)
Dept: AUDIOLOGY | Facility: CLINIC | Age: 65
End: 2020-02-13

## 2020-02-13 NOTE — PROGRESS NOTES
2/13/2020    Ad Lyons stated he would like to proceed with cochlear implantation in his LEFT EAR with [employer] device.

## 2020-02-14 ENCOUNTER — ANESTHESIA EVENT (OUTPATIENT)
Dept: ENDOSCOPY | Facility: HOSPITAL | Age: 65
End: 2020-02-14
Payer: MEDICARE

## 2020-02-17 ENCOUNTER — ANESTHESIA (OUTPATIENT)
Dept: ENDOSCOPY | Facility: HOSPITAL | Age: 65
End: 2020-02-17
Payer: MEDICARE

## 2020-02-17 ENCOUNTER — HOSPITAL ENCOUNTER (OUTPATIENT)
Facility: HOSPITAL | Age: 65
Discharge: HOME | End: 2020-02-17
Attending: INTERNAL MEDICINE | Admitting: INTERNAL MEDICINE
Payer: MEDICARE

## 2020-02-17 DIAGNOSIS — K22.70 BARRETTS ESOPHAGUS: ICD-10-CM

## 2020-02-17 DIAGNOSIS — K80.50 CHOLEDOCHOLITHIASIS: Primary | ICD-10-CM

## 2020-02-17 PROCEDURE — 43247 PR EGD, FLEX, W/REMOVAL, FOREIGN BODY: ICD-10-PCS | Mod: 51,,, | Performed by: INTERNAL MEDICINE

## 2020-02-17 PROCEDURE — 63600175 PHARM REV CODE 636 W HCPCS: Performed by: INTERNAL MEDICINE

## 2020-02-17 PROCEDURE — 37000009 HC ANESTHESIA EA ADD 15 MINS: Performed by: INTERNAL MEDICINE

## 2020-02-17 PROCEDURE — D9220A PRA ANESTHESIA: ICD-10-PCS | Mod: ANES,,, | Performed by: ANESTHESIOLOGY

## 2020-02-17 PROCEDURE — 25500020 PHARM REV CODE 255: Performed by: INTERNAL MEDICINE

## 2020-02-17 PROCEDURE — 43239 EGD BIOPSY SINGLE/MULTIPLE: CPT | Mod: 59,,, | Performed by: INTERNAL MEDICINE

## 2020-02-17 PROCEDURE — 88305 TISSUE EXAM BY PATHOLOGIST: CPT | Performed by: PATHOLOGY

## 2020-02-17 PROCEDURE — 43239 EGD BIOPSY SINGLE/MULTIPLE: CPT | Performed by: INTERNAL MEDICINE

## 2020-02-17 PROCEDURE — 88305 TISSUE EXAM BY PATHOLOGIST: CPT | Mod: 26,,, | Performed by: PATHOLOGY

## 2020-02-17 PROCEDURE — D9220A PRA ANESTHESIA: ICD-10-PCS | Mod: CRNA,,, | Performed by: NURSE ANESTHETIST, CERTIFIED REGISTERED

## 2020-02-17 PROCEDURE — 27201089 HC SNARE, DISP (ANY): Performed by: INTERNAL MEDICINE

## 2020-02-17 PROCEDURE — 43262 ENDO CHOLANGIOPANCREATOGRAPH: CPT | Performed by: INTERNAL MEDICINE

## 2020-02-17 PROCEDURE — 27202125 HC BALLOON, EXTRACTION (ANY): Performed by: INTERNAL MEDICINE

## 2020-02-17 PROCEDURE — 63600175 PHARM REV CODE 636 W HCPCS: Performed by: NURSE ANESTHETIST, CERTIFIED REGISTERED

## 2020-02-17 PROCEDURE — 37000008 HC ANESTHESIA 1ST 15 MINUTES: Performed by: INTERNAL MEDICINE

## 2020-02-17 PROCEDURE — 88305 TISSUE EXAM BY PATHOLOGIST: ICD-10-PCS | Mod: 26,,, | Performed by: PATHOLOGY

## 2020-02-17 PROCEDURE — 25000003 PHARM REV CODE 250: Performed by: NURSE ANESTHETIST, CERTIFIED REGISTERED

## 2020-02-17 PROCEDURE — 74328 PR  X-RAY FOR BILE DUCT ENDOSCOPY: ICD-10-PCS | Mod: 26,,, | Performed by: INTERNAL MEDICINE

## 2020-02-17 PROCEDURE — 43247 EGD REMOVE FOREIGN BODY: CPT | Mod: 51,,, | Performed by: INTERNAL MEDICINE

## 2020-02-17 PROCEDURE — 25000003 PHARM REV CODE 250: Performed by: INTERNAL MEDICINE

## 2020-02-17 PROCEDURE — 43247 EGD REMOVE FOREIGN BODY: CPT

## 2020-02-17 PROCEDURE — 74328 X-RAY BILE DUCT ENDOSCOPY: CPT | Mod: 26,,, | Performed by: INTERNAL MEDICINE

## 2020-02-17 PROCEDURE — 43262 PR ERCP,SPHINCTEROTOMY: ICD-10-PCS | Mod: 51,,, | Performed by: INTERNAL MEDICINE

## 2020-02-17 PROCEDURE — 43264 PR ERCP,W/REMOVAL STONE,BIL/PANCR DUCTS: ICD-10-PCS | Mod: 22,,, | Performed by: INTERNAL MEDICINE

## 2020-02-17 PROCEDURE — 43262 ENDO CHOLANGIOPANCREATOGRAPH: CPT | Mod: 51,,, | Performed by: INTERNAL MEDICINE

## 2020-02-17 PROCEDURE — 43264 ERCP REMOVE DUCT CALCULI: CPT | Performed by: INTERNAL MEDICINE

## 2020-02-17 PROCEDURE — 43264 ERCP REMOVE DUCT CALCULI: CPT | Mod: 22,,, | Performed by: INTERNAL MEDICINE

## 2020-02-17 PROCEDURE — 43239 PR EGD, FLEX, W/BIOPSY, SGL/MULTI: ICD-10-PCS | Mod: 59,,, | Performed by: INTERNAL MEDICINE

## 2020-02-17 PROCEDURE — 27201012 HC FORCEPS, HOT/COLD, DISP: Performed by: INTERNAL MEDICINE

## 2020-02-17 PROCEDURE — D9220A PRA ANESTHESIA: Mod: ANES,,, | Performed by: ANESTHESIOLOGY

## 2020-02-17 PROCEDURE — 43275 ERCP REMOVE FORGN BODY DUCT: CPT | Performed by: INTERNAL MEDICINE

## 2020-02-17 PROCEDURE — 27201674 HC SPHINCTERTOME: Performed by: INTERNAL MEDICINE

## 2020-02-17 PROCEDURE — D9220A PRA ANESTHESIA: Mod: CRNA,,, | Performed by: NURSE ANESTHETIST, CERTIFIED REGISTERED

## 2020-02-17 RX ORDER — SODIUM CHLORIDE 9 MG/ML
INJECTION, SOLUTION INTRAVENOUS CONTINUOUS
Status: DISCONTINUED | OUTPATIENT
Start: 2020-02-17 | End: 2020-02-17 | Stop reason: HOSPADM

## 2020-02-17 RX ORDER — LIDOCAINE HCL/PF 100 MG/5ML
SYRINGE (ML) INTRAVENOUS
Status: DISCONTINUED | OUTPATIENT
Start: 2020-02-17 | End: 2020-02-17

## 2020-02-17 RX ORDER — OXYCODONE HYDROCHLORIDE 5 MG/1
5 TABLET ORAL
Status: CANCELLED | OUTPATIENT
Start: 2020-02-17

## 2020-02-17 RX ORDER — ONDANSETRON 2 MG/ML
4 INJECTION INTRAMUSCULAR; INTRAVENOUS DAILY PRN
Status: CANCELLED | OUTPATIENT
Start: 2020-02-17

## 2020-02-17 RX ORDER — FENTANYL CITRATE 50 UG/ML
INJECTION, SOLUTION INTRAMUSCULAR; INTRAVENOUS
Status: DISCONTINUED | OUTPATIENT
Start: 2020-02-17 | End: 2020-02-17

## 2020-02-17 RX ORDER — PROPOFOL 10 MG/ML
VIAL (ML) INTRAVENOUS
Status: DISCONTINUED | OUTPATIENT
Start: 2020-02-17 | End: 2020-02-17

## 2020-02-17 RX ORDER — SUCRALFATE 1 G/10ML
1 SUSPENSION ORAL 4 TIMES DAILY
Qty: 400 ML | Refills: 0 | Status: SHIPPED | OUTPATIENT
Start: 2020-02-17 | End: 2020-02-27

## 2020-02-17 RX ORDER — ROCURONIUM BROMIDE 10 MG/ML
INJECTION, SOLUTION INTRAVENOUS
Status: DISCONTINUED | OUTPATIENT
Start: 2020-02-17 | End: 2020-02-17

## 2020-02-17 RX ORDER — SUCCINYLCHOLINE CHLORIDE 20 MG/ML
INJECTION INTRAMUSCULAR; INTRAVENOUS
Status: DISCONTINUED | OUTPATIENT
Start: 2020-02-17 | End: 2020-02-17

## 2020-02-17 RX ORDER — ONDANSETRON 2 MG/ML
INJECTION INTRAMUSCULAR; INTRAVENOUS
Status: DISCONTINUED | OUTPATIENT
Start: 2020-02-17 | End: 2020-02-17

## 2020-02-17 RX ORDER — GLYCOPYRROLATE 0.2 MG/ML
INJECTION INTRAMUSCULAR; INTRAVENOUS
Status: DISCONTINUED | OUTPATIENT
Start: 2020-02-17 | End: 2020-02-17

## 2020-02-17 RX ORDER — SODIUM CHLORIDE 0.9 % (FLUSH) 0.9 %
3 SYRINGE (ML) INJECTION
Status: DISCONTINUED | OUTPATIENT
Start: 2020-02-17 | End: 2020-02-17 | Stop reason: HOSPADM

## 2020-02-17 RX ORDER — FENTANYL CITRATE 50 UG/ML
25 INJECTION, SOLUTION INTRAMUSCULAR; INTRAVENOUS EVERY 5 MIN PRN
Status: CANCELLED | OUTPATIENT
Start: 2020-02-17

## 2020-02-17 RX ORDER — DEXAMETHASONE SODIUM PHOSPHATE 4 MG/ML
INJECTION, SOLUTION INTRA-ARTICULAR; INTRALESIONAL; INTRAMUSCULAR; INTRAVENOUS; SOFT TISSUE
Status: DISCONTINUED | OUTPATIENT
Start: 2020-02-17 | End: 2020-02-17

## 2020-02-17 RX ORDER — INDOMETHACIN 50 MG/1
SUPPOSITORY RECTAL
Status: COMPLETED | OUTPATIENT
Start: 2020-02-17 | End: 2020-02-17

## 2020-02-17 RX ADMIN — ROCURONIUM BROMIDE 10 MG: 10 INJECTION, SOLUTION INTRAVENOUS at 02:02

## 2020-02-17 RX ADMIN — GLYCOPYRROLATE 0.2 MG: 0.2 INJECTION, SOLUTION INTRAMUSCULAR; INTRAVENOUS at 02:02

## 2020-02-17 RX ADMIN — PROPOFOL 200 MG: 10 INJECTION, EMULSION INTRAVENOUS at 02:02

## 2020-02-17 RX ADMIN — FENTANYL CITRATE 50 MCG: 50 INJECTION, SOLUTION INTRAMUSCULAR; INTRAVENOUS at 02:02

## 2020-02-17 RX ADMIN — LIDOCAINE HYDROCHLORIDE 100 MG: 20 INJECTION INTRAVENOUS at 02:02

## 2020-02-17 RX ADMIN — SODIUM CHLORIDE: 0.9 INJECTION, SOLUTION INTRAVENOUS at 02:02

## 2020-02-17 RX ADMIN — DEXAMETHASONE SODIUM PHOSPHATE 4 MG: 4 INJECTION, SOLUTION INTRA-ARTICULAR; INTRALESIONAL; INTRAMUSCULAR; INTRAVENOUS; SOFT TISSUE at 02:02

## 2020-02-17 RX ADMIN — SUCCINYLCHOLINE CHLORIDE 160 MG: 20 INJECTION, SOLUTION INTRAMUSCULAR; INTRAVENOUS; PARENTERAL at 02:02

## 2020-02-17 RX ADMIN — IOHEXOL 20 ML: 300 INJECTION, SOLUTION INTRAVENOUS at 03:02

## 2020-02-17 RX ADMIN — INDOMETHACIN 100 MG: 50 SUPPOSITORY RECTAL at 03:02

## 2020-02-17 RX ADMIN — SODIUM CHLORIDE: 0.9 INJECTION, SOLUTION INTRAVENOUS at 01:02

## 2020-02-17 RX ADMIN — ONDANSETRON 4 MG: 2 INJECTION, SOLUTION INTRAMUSCULAR; INTRAVENOUS at 02:02

## 2020-02-17 NOTE — ANESTHESIA PREPROCEDURE EVALUATION
02/17/2020  Ad Lyons is a 65 y.o., male.    Anesthesia Evaluation    I have reviewed the Patient Summary Reports.    I have reviewed the Nursing Notes.      Review of Systems  Anesthesia Hx:  No problems with previous Anesthesia    Cardiovascular:   Hypertension, well controlled    Hepatic/GI:   PUD, GERD, well controlled        Physical Exam  General:  Well nourished    Airway/Jaw/Neck:  Airway Findings: Mallampati: II                Anesthesia Plan  Type of Anesthesia, risks & benefits discussed:  Anesthesia Type:  general  Patient's Preference:   Intra-op Monitoring Plan:   Intra-op Monitoring Plan Comments:   Post Op Pain Control Plan:   Post Op Pain Control Plan Comments:   Induction:   IV  Beta Blocker:  Patient is not currently on a Beta-Blocker (No further documentation required).       Informed Consent: Patient understands risks and agrees with Anesthesia plan.  Questions answered. Anesthesia consent signed with patient.  ASA Score: 2     Day of Surgery Review of History & Physical:    H&P update referred to the surgeon.         Ready For Surgery From Anesthesia Perspective.

## 2020-02-17 NOTE — [employer]OVATION PATIENT INSTRUCTIONS
Discharge Summary/Instructions after an Endoscopic Procedure  Patient Name: Ad Lyons  Patient MRN: [id number]  Patient YOB: 1955  Monday, February 17, 2020  Adrian Cho MD  RESTRICTIONS:  During your procedure today, you received medications for sedation.  These   medications may affect your judgment, balance and coordination.  Therefore,   for 24 hours, you have the following restrictions:   - DO NOT drive a car, operate machinery, make legal/financial decisions,   sign important papers or drink alcohol.    ACTIVITY:  Today: no heavy lifting, straining or running due to procedural   sedation/anesthesia.  The following day: return to full activity including work.  DIET:  Eat and drink normally unless instructed otherwise.     TREATMENT FOR COMMON SIDE EFFECTS:  - Mild abdominal pain, nausea, belching, bloating or excessive gas:  rest,   eat lightly and use a heating pad.  - Sore Throat: treat with throat lozenges and/or gargle with warm salt   water.  - Because air was used during the procedure, expelling large amounts of air   from your rectum or belching is normal.  - If a bowel prep was taken, you may not have a bowel movement for 1-3 days.    This is normal.  SYMPTOMS TO WATCH FOR AND REPORT TO YOUR PHYSICIAN:  1. Abdominal pain or bloating, other than gas cramps.  2. Chest pain.  3. Back pain.  4. Signs of infection such as: chills or fever occurring within 24 hours   after the procedure.  5. Rectal bleeding, which would show as bright red, maroon, or black stools.   (A tablespoon of blood from the rectum is not serious, especially if   hemorrhoids are present.)  6. Vomiting.  7. Weakness or dizziness.  GO DIRECTLY TO THE NEAREST EMERGENCY ROOM IF YOU HAVE ANY OF THE FOLLOWING:      Difficulty breathing              Chills and/or fever over 101 F   Persistent vomiting and/or vomiting blood   Severe abdominal pain   Severe chest pain   Black, tarry stools   Bleeding- more than one  tablespoon   Any other symptom or condition that you feel may need urgent attention  Your doctor recommends these additional instructions:  If any biopsies were taken, your doctors clinic will contact you in 1 to 2   weeks with any results.  - Discharge patient to home (ambulatory).   - Avoid aspirin and nonsteroidal anti-inflammatory medicines.   - Continue present medications.   - Return to my office in 2 weeks.  For questions, problems or results please call your physician - Adrian Cho MD at Work:  [phone].  [facility], EMERGENCY ROOM PHONE NUMBER: [phone]  IF A COMPLICATION OR EMERGENCY SITUATION ARISES AND YOU ARE UNABLE TO REACH   YOUR PHYSICIAN - GO DIRECTLY TO THE EMERGENCY ROOM.  Adrian Cho MD  2/17/2020 3:51:07 PM  This report has been verified and signed electronically.  [employer]

## 2020-02-17 NOTE — OR NURSING
Pt waking up in RR Room-recovering by Fatou ROSE.  Attempted to call wife and left several voice mails for her to return call but not answering.

## 2020-02-17 NOTE — PLAN OF CARE
Vss, rain po fluids, denies pain, ambulates easily. IV removed, catheter intact. Discharge instructions provided and states understanding. States ready to go home.  Discharged from facility with family per wheelchair.

## 2020-02-17 NOTE — ANESTHESIA POSTPROCEDURE EVALUATION
Anesthesia Post Evaluation    Patient: Ad Lyons    Procedure(s) Performed: Procedure(s) (LRB):  ERCP (ENDOSCOPIC RETROGRADE CHOLANGIOPANCREATOGRAPHY) (N/A)  EGD (ESOPHAGOGASTRODUODENOSCOPY) (N/A)    Final Anesthesia Type: general    Patient location during evaluation: PACU  Patient participation: Yes- Able to Participate  Level of consciousness: awake and alert and oriented  Post-procedure vital signs: reviewed and stable  Pain management: adequate  Airway patency: patent    PONV status at discharge: No PONV  Anesthetic complications: no      Cardiovascular status: blood pressure returned to baseline  Respiratory status: unassisted, spontaneous ventilation and room air  Hydration status: euvolemic  Follow-up not needed.          Vitals Value Taken Time   /67 2/17/2020  4:30 PM   Temp 36.8 °C (98.2 °F) 2/17/2020  1:12 PM   Pulse 71 2/17/2020  4:30 PM   Resp 16 2/17/2020  4:30 PM   SpO2 96 % 2/17/2020  4:30 PM         No case tracking events are documented in the log.      Pain/Jyothi Score: Jyothi Score: 10 (2/17/2020  4:30 PM)

## 2020-02-17 NOTE — ANESTHESIA PROCEDURE NOTES
Intubation  Performed by: Victor Hugo Eduardo CRNA  Authorized by: Brett Aparicio MD     Intubation:     Induction:  Intravenous    Intubated:  Postinduction    Mask Ventilation:  Easy mask    Attempts:  1    Attempted By:  CRNA    Method of Intubation:  Direct    Blade:  Stratton 2    Laryngeal View Grade: Grade I - full view of chords      Difficult Airway Encountered?: No      Complications:  None    Airway Device:  Oral endotracheal tube    Airway Device Size:  7.5    Style/Cuff Inflation:  Cuffed    Inflation Amount (mL):  5    Tube secured:  23    Secured at:  The lips    Placement Verified By:  Capnometry    Complicating Factors:  None    Findings Post-Intubation:  BS equal bilateral

## 2020-02-17 NOTE — PROVATION PATIENT INSTRUCTIONS
Discharge Summary/Instructions after an Endoscopic Procedure  Patient Name: Ad Lyons  Patient MRN: [id number]  Patient YOB: 1955  Monday, February 17, 2020  Adrian Cho MD  RESTRICTIONS:  During your procedure today, you received medications for sedation.  These   medications may affect your judgment, balance and coordination.  Therefore,   for 24 hours, you have the following restrictions:   - DO NOT drive a car, operate machinery, make legal/financial decisions,   sign important papers or drink alcohol.    ACTIVITY:  Today: no heavy lifting, straining or running due to procedural   sedation/anesthesia.  The following day: return to full activity including work.  DIET:  Eat and drink normally unless instructed otherwise.     TREATMENT FOR COMMON SIDE EFFECTS:  - Mild abdominal pain, nausea, belching, bloating or excessive gas:  rest,   eat lightly and use a heating pad.  - Sore Throat: treat with throat lozenges and/or gargle with warm salt   water.  - Because air was used during the procedure, expelling large amounts of air   from your rectum or belching is normal.  - If a bowel prep was taken, you may not have a bowel movement for 1-3 days.    This is normal.  SYMPTOMS TO WATCH FOR AND REPORT TO YOUR PHYSICIAN:  1. Abdominal pain or bloating, other than gas cramps.  2. Chest pain.  3. Back pain.  4. Signs of infection such as: chills or fever occurring within 24 hours   after the procedure.  5. Rectal bleeding, which would show as bright red, maroon, or black stools.   (A tablespoon of blood from the rectum is not serious, especially if   hemorrhoids are present.)  6. Vomiting.  7. Weakness or dizziness.  GO DIRECTLY TO THE NEAREST EMERGENCY ROOM IF YOU HAVE ANY OF THE FOLLOWING:      Difficulty breathing              Chills and/or fever over 101 F   Persistent vomiting and/or vomiting blood   Severe abdominal pain   Severe chest pain   Black, tarry stools   Bleeding- more than one  tablespoon   Any other symptom or condition that you feel may need urgent attention  Your doctor recommends these additional instructions:  If any biopsies were taken, your doctors clinic will contact you in 1 to 2   weeks with any results.  - Patient has a contact number available for emergencies.  The signs and   symptoms of potential delayed complications were discussed with the   patient.  Return to normal activities tomorrow.  Written discharge   instructions were provided to the patient.   - Resume previous diet.   - Continue present medications.   - No aspirin, ibuprofen, naproxen, or other non-steroidal anti-inflammatory   drugs.   - Await pathology results.   - Repeat upper endoscopy (date not yet determined) for surveillance.   - Discharge patient to home (ambulatory).   - Perform an ERCP today.  For questions, problems or results please call your physician - Adrian Cho MD at Work:  [phone].  [facility], EMERGENCY ROOM PHONE NUMBER: [phone]  IF A COMPLICATION OR EMERGENCY SITUATION ARISES AND YOU ARE UNABLE TO REACH   YOUR PHYSICIAN - GO DIRECTLY TO THE EMERGENCY ROOM.  Adrian Cho MD  2/17/2020 3:40:49 PM  This report has been verified and signed electronically.  [employer]

## 2020-02-17 NOTE — TRANSFER OF CARE
"Anesthesia Transfer of Care Note    Patient: Ad Lyons    Procedure(s) Performed: Procedure(s) (LRB):  ERCP (ENDOSCOPIC RETROGRADE CHOLANGIOPANCREATOGRAPHY) (N/A)  EGD (ESOPHAGOGASTRODUODENOSCOPY) (N/A)    Patient location: PACU    Anesthesia Type: general    Transport from OR: Transported from OR on room air with adequate spontaneous ventilation    Post pain: adequate analgesia    Post assessment: no apparent anesthetic complications    Post vital signs: stable    Level of consciousness: awake    Nausea/Vomiting: no nausea/vomiting    Complications: none    Transfer of care protocol was followed      Last vitals:   Visit Vitals  /71 (BP Location: Left arm, Patient Position: Lying)   Pulse 100   Temp 36.8 °C (98.2 °F) (Skin)   Resp 16   Ht 6' 2" (1.88 m)   Wt 103 kg (227 lb)   SpO2 96%   BMI 29.15 kg/m²     "

## 2020-02-18 VITALS
WEIGHT: 227 LBS | HEIGHT: 74 IN | SYSTOLIC BLOOD PRESSURE: 142 MMHG | TEMPERATURE: 98 F | RESPIRATION RATE: 16 BRPM | HEART RATE: 71 BPM | OXYGEN SATURATION: 96 % | BODY MASS INDEX: 29.13 KG/M2 | DIASTOLIC BLOOD PRESSURE: 67 MMHG

## 2020-02-28 LAB
FINAL PATHOLOGIC DIAGNOSIS: NORMAL
GROSS: NORMAL

## 2020-03-03 ENCOUNTER — TELEPHONE (OUTPATIENT)
Dept: GASTROENTEROLOGY | Facility: CLINIC | Age: 65
End: 2020-03-03

## 2020-03-03 NOTE — TELEPHONE ENCOUNTER
----- Message from Adrian Castillo MD sent at 3/2/2020  3:21 PM CST -----  Please notify patient that biopsies reviewed and showed no bacteria and no abnormal tissue.  Continue current meds and follow up as previously planned.

## 2020-03-06 ENCOUNTER — TELEPHONE (OUTPATIENT)
Dept: OTOLARYNGOLOGY | Facility: CLINIC | Age: 65
End: 2020-03-06

## 2020-03-06 NOTE — TELEPHONE ENCOUNTER
----- Message from Gege Jones sent at 3/6/2020  9:20 AM CST -----  Regarding: Appeal  Contact: [phone]  Essie     This patient called asking what the status is on his CI surgery.  He said he gets a call saying the appeal is denied then gets a call saying it's approved.  Please call him at [phone].  He said to not call any other numbers in the system b/c his answering machine is broken.

## 2020-03-19 ENCOUNTER — TELEPHONE (OUTPATIENT)
Dept: GASTROENTEROLOGY | Facility: CLINIC | Age: 65
End: 2020-03-19

## 2020-04-02 RX ORDER — CHLORHEXIDINE GLUCONATE ORAL RINSE 1.2 MG/ML
SOLUTION DENTAL
Qty: 473 ML | Refills: 3 | Status: SHIPPED | OUTPATIENT
Start: 2020-04-02 | End: 2021-04-26 | Stop reason: SDUPTHER

## 2020-04-02 NOTE — TELEPHONE ENCOUNTER
----- Message from Reese Macias sent at 4/2/2020  9:36 AM CDT -----  Type:  RX Refill Request    Who Called:  Patient  Refill or New Rx:  Refill  RX Name and Strength:  chlorhexidine (PERIDEX) 0.12 % solution       How is the patient currently taking it? (ex. 1XDay):  RINSE WITH 1/2 OZ. ONCE DAILY FOR 5 DAYS   Is this a 30 day or 90 day RX:  90  Preferred Pharmacy with phone number:    [facility]  [address] [place] [address] [phone]    Local or Mail Order:  Local  Ordering Provider:  Raven Kumar Call Back Number:  [phone]  Additional Information:

## 2020-04-06 ENCOUNTER — TELEPHONE (OUTPATIENT)
Dept: FAMILY MEDICINE | Facility: CLINIC | Age: 65
End: 2020-04-06

## 2020-04-06 NOTE — TELEPHONE ENCOUNTER
----- Message from Kwaku Gipson sent at 4/6/2020 12:09 PM CDT -----  Contact: self   Patient need a refill on chlorhexidine (PERIDEX) 0.12 % 90 day supply and ZOFRAN-ODT) 8 MG  please send to [facility], any questions please call back at [phone] (home)     Case number [id number]  [facility]  [facility] [address]  Phone Number [phone]

## 2020-04-07 ENCOUNTER — TELEPHONE (OUTPATIENT)
Dept: FAMILY MEDICINE | Facility: CLINIC | Age: 65
End: 2020-04-07

## 2020-04-07 DIAGNOSIS — R11.2 INTRACTABLE VOMITING WITH NAUSEA, UNSPECIFIED VOMITING TYPE: ICD-10-CM

## 2020-04-07 DIAGNOSIS — R42 VERTIGO: ICD-10-CM

## 2020-04-07 DIAGNOSIS — H81.02 ACTIVE COCHLEOVESTIBULAR MENIERE'S DISEASE OF LEFT EAR: ICD-10-CM

## 2020-04-07 RX ORDER — MECLIZINE HYDROCHLORIDE 25 MG/1
25 TABLET ORAL 3 TIMES DAILY PRN
Qty: 60 TABLET | Refills: 0 | Status: SHIPPED | OUTPATIENT
Start: 2020-04-07 | End: 2020-08-24

## 2020-04-07 RX ORDER — ONDANSETRON 8 MG/1
8 TABLET, ORALLY DISINTEGRATING ORAL EVERY 8 HOURS PRN
Qty: 20 TABLET | Refills: 0 | Status: SHIPPED | OUTPATIENT
Start: 2020-04-07 | End: 2022-01-06

## 2020-04-07 RX ORDER — TRIAMTERENE AND HYDROCHLOROTHIAZIDE 37.5; 25 MG/1; MG/1
CAPSULE ORAL
Qty: 90 CAPSULE | Refills: 1 | Status: SHIPPED | OUTPATIENT
Start: 2020-04-07 | End: 2022-01-06

## 2020-04-07 NOTE — TELEPHONE ENCOUNTER
Patient having flare up of Meniere's. Asking for refill of Meclizine and Zofran. Asking for 90 day supply to [facility] [address]. Patient says he may have nausea for 3-4 days in a row from the dizziness.

## 2020-04-07 NOTE — TELEPHONE ENCOUNTER
----- Message from Allyson Gama MA sent at 4/7/2020 10:37 AM CDT -----  Contact: self   Patient requesting to speak to nurse regarding changing out medications that patient is currently on per patient   The medication is for nausea ,and for ears per patient     Please call to advice [phone]

## 2020-05-05 ENCOUNTER — PATIENT MESSAGE (OUTPATIENT)
Dept: ADMINISTRATIVE | Facility: HOSPITAL | Age: 65
End: 2020-05-05

## 2020-05-13 ENCOUNTER — DOCUMENTATION ONLY (OUTPATIENT)
Dept: FAMILY MEDICINE | Facility: CLINIC | Age: 65
End: 2020-05-13

## 2020-05-13 NOTE — PROGRESS NOTES
Pre-Visit Chart Review  For Appointment Scheduled on 5-18-20    Health Maintenance Due   Topic Date Due    TETANUS VACCINE  01/15/1972    High Dose Statin  01/15/1975

## 2020-05-18 ENCOUNTER — OFFICE VISIT (OUTPATIENT)
Dept: FAMILY MEDICINE | Facility: CLINIC | Age: 65
End: 2020-05-18
Attending: FAMILY MEDICINE
Payer: MEDICARE

## 2020-05-18 ENCOUNTER — LAB VISIT (OUTPATIENT)
Dept: LAB | Facility: HOSPITAL | Age: 65
End: 2020-05-18
Attending: FAMILY MEDICINE
Payer: MEDICARE

## 2020-05-18 VITALS
BODY MASS INDEX: 28.58 KG/M2 | WEIGHT: 222.69 LBS | RESPIRATION RATE: 16 BRPM | HEIGHT: 74 IN | TEMPERATURE: 98 F | HEART RATE: 63 BPM | SYSTOLIC BLOOD PRESSURE: 120 MMHG | OXYGEN SATURATION: 97 % | DIASTOLIC BLOOD PRESSURE: 70 MMHG

## 2020-05-18 DIAGNOSIS — Z00.00 ENCOUNTER FOR PREVENTIVE HEALTH EXAMINATION: Primary | ICD-10-CM

## 2020-05-18 DIAGNOSIS — K21.9 GASTROESOPHAGEAL REFLUX DISEASE WITHOUT ESOPHAGITIS: ICD-10-CM

## 2020-05-18 DIAGNOSIS — Z86.010 HX OF COLONIC POLYPS: ICD-10-CM

## 2020-05-18 DIAGNOSIS — R35.1 BPH ASSOCIATED WITH NOCTURIA: ICD-10-CM

## 2020-05-18 DIAGNOSIS — H93.13 TINNITUS OF BOTH EARS: ICD-10-CM

## 2020-05-18 DIAGNOSIS — E78.2 MIXED HYPERLIPIDEMIA: ICD-10-CM

## 2020-05-18 DIAGNOSIS — I10 ESSENTIAL HYPERTENSION: ICD-10-CM

## 2020-05-18 DIAGNOSIS — K83.09 CHOLANGITIS: ICD-10-CM

## 2020-05-18 DIAGNOSIS — M54.50 ACUTE RIGHT-SIDED LOW BACK PAIN WITHOUT SCIATICA: ICD-10-CM

## 2020-05-18 DIAGNOSIS — H81.02 ACTIVE COCHLEOVESTIBULAR MENIERE'S DISEASE OF LEFT EAR: ICD-10-CM

## 2020-05-18 DIAGNOSIS — R29.898 MUSCULAR DECONDITIONING: ICD-10-CM

## 2020-05-18 DIAGNOSIS — H90.3 SENSORINEURAL HEARING LOSS (SNHL) OF BOTH EARS: ICD-10-CM

## 2020-05-18 DIAGNOSIS — N40.1 BPH ASSOCIATED WITH NOCTURIA: ICD-10-CM

## 2020-05-18 DIAGNOSIS — K80.50 CHOLEDOCHOLITHIASIS: ICD-10-CM

## 2020-05-18 DIAGNOSIS — K22.70 BARRETT'S ESOPHAGUS WITHOUT DYSPLASIA: ICD-10-CM

## 2020-05-18 DIAGNOSIS — Z00.00 ENCOUNTER FOR PREVENTIVE HEALTH EXAMINATION: ICD-10-CM

## 2020-05-18 LAB
ALBUMIN SERPL BCP-MCNC: 4.3 G/DL (ref 3.5–5.2)
ALP SERPL-CCNC: 65 U/L (ref 55–135)
ALT SERPL W/O P-5'-P-CCNC: 17 U/L (ref 10–44)
ANION GAP SERPL CALC-SCNC: 8 MMOL/L (ref 8–16)
AST SERPL-CCNC: 15 U/L (ref 10–40)
BASOPHILS # BLD AUTO: 0.03 K/UL (ref 0–0.2)
BASOPHILS NFR BLD: 0.4 % (ref 0–1.9)
BILIRUB SERPL-MCNC: 0.7 MG/DL (ref 0.1–1)
BUN SERPL-MCNC: 11 MG/DL (ref 8–23)
CALCIUM SERPL-MCNC: 9.1 MG/DL (ref 8.7–10.5)
CHLORIDE SERPL-SCNC: 105 MMOL/L (ref 95–110)
CHOLEST SERPL-MCNC: 173 MG/DL (ref 120–199)
CHOLEST/HDLC SERPL: 4.6 {RATIO} (ref 2–5)
CO2 SERPL-SCNC: 27 MMOL/L (ref 23–29)
CREAT SERPL-MCNC: 0.9 MG/DL (ref 0.5–1.4)
DIFFERENTIAL METHOD: NORMAL
EOSINOPHIL # BLD AUTO: 0.5 K/UL (ref 0–0.5)
EOSINOPHIL NFR BLD: 5.5 % (ref 0–8)
ERYTHROCYTE [DISTWIDTH] IN BLOOD BY AUTOMATED COUNT: 13.3 % (ref 11.5–14.5)
EST. GFR  (AFRICAN AMERICAN): >60 ML/MIN/1.73 M^2
EST. GFR  (NON AFRICAN AMERICAN): >60 ML/MIN/1.73 M^2
GLUCOSE SERPL-MCNC: 78 MG/DL (ref 70–110)
HCT VFR BLD AUTO: 44.4 % (ref 40–54)
HDLC SERPL-MCNC: 38 MG/DL (ref 40–75)
HDLC SERPL: 22 % (ref 20–50)
HGB BLD-MCNC: 15 G/DL (ref 14–18)
IMM GRANULOCYTES # BLD AUTO: 0.01 K/UL (ref 0–0.04)
IMM GRANULOCYTES NFR BLD AUTO: 0.1 % (ref 0–0.5)
LDLC SERPL CALC-MCNC: 109.8 MG/DL (ref 63–159)
LYMPHOCYTES # BLD AUTO: 3.6 K/UL (ref 1–4.8)
LYMPHOCYTES NFR BLD: 43.4 % (ref 18–48)
MCH RBC QN AUTO: 30.3 PG (ref 27–31)
MCHC RBC AUTO-ENTMCNC: 33.8 G/DL (ref 32–36)
MCV RBC AUTO: 90 FL (ref 82–98)
MONOCYTES # BLD AUTO: 0.5 K/UL (ref 0.3–1)
MONOCYTES NFR BLD: 6.3 % (ref 4–15)
NEUTROPHILS # BLD AUTO: 3.7 K/UL (ref 1.8–7.7)
NEUTROPHILS NFR BLD: 44.3 % (ref 38–73)
NONHDLC SERPL-MCNC: 135 MG/DL
NRBC BLD-RTO: 0 /100 WBC
PLATELET # BLD AUTO: 206 K/UL (ref 150–350)
PMV BLD AUTO: 9.5 FL (ref 9.2–12.9)
POTASSIUM SERPL-SCNC: 4.3 MMOL/L (ref 3.5–5.1)
PROT SERPL-MCNC: 7.2 G/DL (ref 6–8.4)
RBC # BLD AUTO: 4.95 M/UL (ref 4.6–6.2)
SODIUM SERPL-SCNC: 140 MMOL/L (ref 136–145)
TRIGL SERPL-MCNC: 126 MG/DL (ref 30–150)
WBC # BLD AUTO: 8.24 K/UL (ref 3.9–12.7)

## 2020-05-18 PROCEDURE — 85025 COMPLETE CBC W/AUTO DIFF WBC: CPT

## 2020-05-18 PROCEDURE — 80053 COMPREHEN METABOLIC PANEL: CPT

## 2020-05-18 PROCEDURE — 3078F PR MOST RECENT DIASTOLIC BLOOD PRESSURE < 80 MM HG: ICD-10-PCS | Mod: S$GLB,,, | Performed by: FAMILY MEDICINE

## 2020-05-18 PROCEDURE — 99999 PR PBB SHADOW E&M-EST. PATIENT-LVL IV: CPT | Mod: PBBFAC,,, | Performed by: FAMILY MEDICINE

## 2020-05-18 PROCEDURE — 99397 PER PM REEVAL EST PAT 65+ YR: CPT | Mod: S$GLB,,, | Performed by: FAMILY MEDICINE

## 2020-05-18 PROCEDURE — 36415 COLL VENOUS BLD VENIPUNCTURE: CPT

## 2020-05-18 PROCEDURE — 99397 PR PREVENTIVE VISIT,EST,65 & OVER: ICD-10-PCS | Mod: S$GLB,,, | Performed by: FAMILY MEDICINE

## 2020-05-18 PROCEDURE — 80061 LIPID PANEL: CPT

## 2020-05-18 PROCEDURE — 3074F SYST BP LT 130 MM HG: CPT | Mod: S$GLB,,, | Performed by: FAMILY MEDICINE

## 2020-05-18 PROCEDURE — 99999 PR PBB SHADOW E&M-EST. PATIENT-LVL IV: ICD-10-PCS | Mod: PBBFAC,,, | Performed by: FAMILY MEDICINE

## 2020-05-18 PROCEDURE — 3078F DIAST BP <80 MM HG: CPT | Mod: S$GLB,,, | Performed by: FAMILY MEDICINE

## 2020-05-18 PROCEDURE — 3074F PR MOST RECENT SYSTOLIC BLOOD PRESSURE < 130 MM HG: ICD-10-PCS | Mod: S$GLB,,, | Performed by: FAMILY MEDICINE

## 2020-05-18 RX ORDER — SUCRALFATE 1 G/10ML
1 SUSPENSION ORAL
COMMUNITY
End: 2020-05-18 | Stop reason: SDUPTHER

## 2020-05-18 RX ORDER — TIZANIDINE 4 MG/1
4 TABLET ORAL EVERY 6 HOURS PRN
Qty: 90 TABLET | Refills: 2 | Status: SHIPPED | OUTPATIENT
Start: 2020-05-18 | End: 2020-05-28

## 2020-05-18 RX ORDER — SUCRALFATE 1 G/10ML
1 SUSPENSION ORAL 4 TIMES DAILY PRN
Qty: 1242 ML | Refills: 1 | Status: SHIPPED | OUTPATIENT
Start: 2020-05-18

## 2020-05-18 NOTE — PROGRESS NOTES
Subjective:       Patient ID: Ad Lyons is a 65 y.o. male.    Chief Complaint: Annual Exam    65-year-old male coming in for physical exam.  He has been experiencing several problems recently with progressively worsening Meniere's disease, vertigo, and profound hearing loss on the left side.  ENT wanted to do a labyrinthectomy E followed by a cochlear implant but it was denied by his insurance.  He also was experiencing some right upper quadrant abdominal pain and was found to have cholangitis with choledocholithiasis.  An ERCP seems to have resolved the problem although he still has some tenderness in his right upper abdomen.  He is using meclizine p.r.n. for the vertigo in tells me he has taken about two of them in the last month.  He uses Carafate for esophagitis and sore throat with dysphagia.  History includes the Meniere's disease with vertigo and hearing loss, hypertension, hyperlipidemia, BPH, reflux with Mills's esophagus and colon polyps.  He has had a total of three ERCPs as well as a common bile duct stent.      He is also complaining of some pain in his right to mid lower back without radiation to the legs or thighs.  He states this occurred when he picked up a heavy gas can for his lawn more.  It may be improving slowly but it is interfering with sleep in other activities.    Past Medical History:  No date: Mills's esophagus  No date: DDD (degenerative disc disease), cervical  1/7/2019: Diarrhea  No date: Ear ringing  No date: Gallbladder disease  No date: Gastric ulcer with perforation  No date: GERD (gastroesophageal reflux disease)  No date: Hyperlipidemia  1/7/2019: Intractable vomiting with nausea  No date: Kidney stone  2017: Meniere disease      Comment:  Dr TAYLA Mohan  No date: Vertigo    Past Surgical History:  No date: ADENOIDECTOMY  No date: CHOLECYSTECTOMY  12/27/2017: COLONOSCOPY; N/A      Comment:  Procedure: COLONOSCOPY;  Surgeon: Adrian Castillo MD;                Location:  [facility] END[facility];  Service: Endoscopy;  Laterality:                N/A;  1/2013: COLONOSCOPY W/ POLYPECTOMY      Comment:  Dr Hughes  12/26/2016: ERCP      Comment:  [facility] Epic note  1/9/2020: ERCP; N/A      Comment:  Procedure: ERCP (ENDOSCOPIC RETROGRADE                CHOLANGIOPANCREATOGRAPHY);  Surgeon: Adrian Castillo MD;  Location: [facility] END[facility];  Service: Endoscopy;                 Laterality: N/A;  2/17/2020: ERCP; N/A      Comment:  Procedure: ERCP (ENDOSCOPIC RETROGRADE                CHOLANGIOPANCREATOGRAPHY);  Surgeon: Adrian Castillo MD;  Location: [facility] ENDO;  Service: Endoscopy;                 Laterality: N/A;  1/2013: ESOPHAGOGASTRODUODENOSCOPY      Comment:  Dr Hughes  2/17/2020: ESOPHAGOGASTRODUODENOSCOPY; N/A      Comment:  Procedure: EGD (ESOPHAGOGASTRODUODENOSCOPY);  Surgeon:                Adrian Castillo MD;  Location: [facility] ENDO;  Service:                Endoscopy;  Laterality: N/A;  No date: TONSILLECTOMY  No date: VASECTOMY    Review of patient's family history indicates:  Problem: Heart disease      Relation: Father          Age of Onset: (Not Specified)  Problem: Heart disease      Relation: Mother          Age of Onset: (Not Specified)  Problem: Hyperlipidemia      Relation: Mother          Age of Onset: (Not Specified)  Problem: No Known Problems      Relation: Sister          Age of Onset: (Not Specified)  Problem: Cancer      Relation: Brother          Age of Onset: (Not Specified)          Comment: possible colon cancer  Problem: No Known Problems      Relation: Daughter          Age of Onset: (Not Specified)  Problem: No Known Problems      Relation: Son          Age of Onset: (Not Specified)  Problem: Cancer      Relation: Sister          Age of Onset: (Not Specified)          Comment: breast cancer  Problem: No Known Problems      Relation: Daughter          Age of Onset: (Not Specified)  Problem: No Known Problems      Relation: Son          Age of  Onset: (Not Specified)    Social History    Tobacco Use      Smoking status: Former Smoker        Packs/day: 1.00        Start date:         Quit date:         Years since quittin.4      Smokeless tobacco: Never Used    Alcohol use: No      Frequency: Never    Drug use: Never    Current Outpatient Medications on File Prior to Visit:  azelastine (ASTELIN) 137 mcg (0.1 %) nasal spray, 1 spray (137 mcg total) by Nasal route 2 (two) times daily. (Patient taking differently: 1 spray by Nasal route 2 (two) times daily as needed for Rhinitis. ), Disp: 90 mL, Rfl: 3  chlorhexidine (PERIDEX) 0.12 % solution, RINSE WITH 1/2 OZ. ONCE DAILY FOR 5 DAYS, Disp: 473 mL, Rfl: 3  cholestyramine, with sugar, 4 gram Powd, 3 scoops per day for diarrhea after gallbladder surgery, Disp: 1134 g, Rfl: 4  cyanocobalamin, vitamin B-12, (VITAMIN B-12) 1,000 mcg Subl, Place 1 tablet under the tongue once daily. , Disp: , Rfl:   fluticasone propionate (FLONASE) 50 mcg/actuation nasal spray, 2 sprays (100 mcg total) by Each Nare route Daily. (Patient taking differently: 2 sprays by Each Nostril route daily as needed for Rhinitis. ), Disp: 3 Bottle, Rfl: 3  LOPERAMIDE HCL (IMODIUM A-D ORAL), Take 2 tablets by mouth daily as needed (diarrhea). , Disp: , Rfl:   meclizine (ANTIVERT) 25 mg tablet, Take 1 tablet (25 mg total) by mouth 3 (three) times daily as needed for Dizziness., Disp: 60 tablet, Rfl: 0  omeprazole (PRILOSEC) 40 MG capsule, Take 1 capsule (40 mg total) by mouth 2 (two) times daily before meals. (Patient taking differently: Take 40 mg by mouth once daily. ), Disp: 180 capsule, Rfl: 3  ondansetron (ZOFRAN-ODT) 8 MG TbDL, Take 1 tablet (8 mg total) by mouth every 8 (eight) hours as needed (nausea)., Disp: 20 tablet, Rfl: 0  triamterene-hydrochlorothiazide 37.5-25 mg (DYAZIDE) 37.5-25 mg per capsule, 1/2 to 1 tablet every day during Meniere's episode, Disp: 90 capsule, Rfl: 1  (DISCONTINUED) sucralfate (CARAFATE) 100 mg/mL  suspension, Take 1 g by mouth as needed., Disp: , Rfl:   (DISCONTINUED) sucralfate (CARAFATE) 1 gram tablet, Take 1 tablet (1 g total) by mouth 4 (four) times daily as needed. Dissolve pill in 2 tablespoons water and drink, Disp: 360 tablet, Rfl: 3    No current facility-administered medications on file prior to visit.         Review of Systems   Constitutional: Positive for activity change. Negative for unexpected weight change.   HENT: Positive for hearing loss, rhinorrhea and tinnitus. Negative for trouble swallowing.    Eyes: Negative for discharge and visual disturbance.   Respiratory: Negative for chest tightness and wheezing.    Cardiovascular: Negative for chest pain and palpitations.   Gastrointestinal: Negative for blood in stool, constipation, diarrhea and vomiting.   Endocrine: Negative for polydipsia and polyuria.   Genitourinary: Negative for difficulty urinating, hematuria and urgency.   Musculoskeletal: Positive for back pain. Negative for arthralgias, joint swelling and neck pain.   Neurological: Positive for dizziness and weakness. Negative for headaches.   Psychiatric/Behavioral: Negative for confusion and dysphoric mood.       Objective:      Physical Exam   Constitutional: He is oriented to person, place, and time. He appears well-developed and well-nourished. No distress.   Good blood pressure control  Normal weight with a BMI of 28.6 he is down 2.4 lb from his May 13, 2019 visit   HENT:   Head: Normocephalic and atraumatic.   Right Ear: External ear normal.   Left Ear: External ear normal.   Nose: Nose normal.   Mouth/Throat: Oropharynx is clear and moist. No oropharyngeal exudate.   Hearing aids bilaterally although according to ENT he is getting very little benefit on the left side   Eyes: Pupils are equal, round, and reactive to light. EOM are normal. No scleral icterus.   Neck: Normal range of motion. Neck supple. No JVD present. No tracheal deviation present. No thyromegaly present.    Cardiovascular: Normal rate, regular rhythm, normal heart sounds and intact distal pulses. Exam reveals no gallop and no friction rub.   No murmur heard.  Pulmonary/Chest: Effort normal and breath sounds normal. No stridor. No respiratory distress. He has no wheezes. He has no rales. He exhibits no tenderness.   Abdominal: Soft. Bowel sounds are normal. He exhibits no distension and no mass. There is no tenderness. There is no rebound and no guarding. No hernia.   Musculoskeletal:   Straight leg raise test impaired by lack of flexibility.  He is only able to lift the legs 10 to 15° before complaining of stretch pains and the posterior thighs.  He also has very little flexibility in the hips and overall conditioning is considered to be very poor.   Lymphadenopathy:     He has no cervical adenopathy.   Neurological: He is alert and oriented to person, place, and time. He displays normal reflexes. No cranial nerve deficit or sensory deficit. He exhibits normal muscle tone.   Skin: Skin is warm and dry. No rash noted. He is not diaphoretic. No erythema.   Psychiatric: He has a normal mood and affect. His behavior is normal. Judgment and thought content normal.   Nursing note and vitals reviewed.      Assessment:       1. Encounter for preventive health examination    2. Essential hypertension    3. Mixed hyperlipidemia    4. Active cochleovestibular Meniere's disease of left ear    5. Tinnitus of both ears    6. Sensorineural hearing loss (SNHL) of both ears    7. BPH associated with nocturia    8. Cholangitis    9. Gastroesophageal reflux disease without esophagitis    10. Mills's esophagus without dysplasia    11. Choledocholithiasis    12. Hx of colonic polyps    13. Acute right-sided low back pain without sciatica    14. Muscular deconditioning    15. BMI 28.0-28.9,adult        Plan:       1. Encounter for preventive health examination  - Comprehensive metabolic panel; Future  - Lipid Panel; Future  - CBC auto  differential; Future    2. Essential hypertension  Good control with no changes needed  - Comprehensive metabolic panel; Future  - CBC auto differential; Future    3. Mixed hyperlipidemia  Await lab results  - Comprehensive metabolic panel; Future  - Lipid Panel; Future    4. Active cochleovestibular Meniere's disease of left ear  Insurance declined labyrinth ectomy and cochlear implant    5. Tinnitus of both ears    6. Sensorineural hearing loss (SNHL) of both ears    7. BPH associated with nocturia    8. Cholangitis  Followed by GI    9. Gastroesophageal reflux disease without esophagitis  Followed by GI    10. Mills's esophagus without dysplasia  Followed by GI with periodic upper endoscopies    11. Choledocholithiasis  Status post ERCP    12. Hx of colonic polyps    13. Acute right-sided low back pain without sciatica  Severe deconditioning  - Ambulatory referral/consult to Physical/Occupational Therapy; Future  - tiZANidine (ZANAFLEX) 4 MG tablet; Take 1 tablet (4 mg total) by mouth every 6 (six) hours as needed.  Dispense: 90 tablet; Refill: 2    14. Muscular deconditioning  - Ambulatory referral/consult to Physical/Occupational Therapy; Future    15. BMI 28.0-28.9,adult

## 2020-05-21 ENCOUNTER — CLINICAL SUPPORT (OUTPATIENT)
Dept: REHABILITATION | Facility: HOSPITAL | Age: 65
End: 2020-05-21
Attending: FAMILY MEDICINE
Payer: MEDICARE

## 2020-05-21 DIAGNOSIS — M54.50 ACUTE RIGHT-SIDED LOW BACK PAIN WITHOUT SCIATICA: Primary | ICD-10-CM

## 2020-05-21 DIAGNOSIS — R29.898 DECREASED STRENGTH OF TRUNK AND BACK: ICD-10-CM

## 2020-05-21 DIAGNOSIS — R29.898 DECREASED STRENGTH OF LOWER EXTREMITY: ICD-10-CM

## 2020-05-21 DIAGNOSIS — M54.9 TENDERNESS OF BACK: ICD-10-CM

## 2020-05-21 DIAGNOSIS — M53.86 DECREASED RANGE OF MOTION OF INTERVERTEBRAL DISCS OF LUMBAR SPINE: ICD-10-CM

## 2020-05-21 DIAGNOSIS — R29.898 MUSCULAR DECONDITIONING: ICD-10-CM

## 2020-05-21 DIAGNOSIS — R29.898 IMPAIRED FLEXIBILITY OF LOWER EXTREMITY: ICD-10-CM

## 2020-05-21 PROCEDURE — 97161 PT EVAL LOW COMPLEX 20 MIN: CPT

## 2020-05-21 NOTE — PLAN OF CARE
[facility]  Physical Therapy Initial Evaluation    Name: Ad Lyons  Clinic Number: [id number]    Therapy Diagnosis:   Encounter Diagnoses   Name Primary?    Acute right-sided low back pain without sciatica     Muscular deconditioning      Physician: Jimmie Carbajal MD    Physician Orders: PT Eval and Treat   Medical Diagnosis from Referral:   M54.5 (ICD-10-CM) - Acute right-sided low back pain without sciatica   R29.898 (ICD-10-CM) - Muscular deconditioning       Evaluation Date: 5/21/2020  Authorization Period Expiration: 12/31/2020  Current Certification Period: 5/21/2020- 7/17/2020  Plan of Care Expiration: 7/17/2020  Visit # / Visits authorized: 1/ 12 (0/6 per POC)    Time In: 900 AM  Time Out: 950 AM  Total Appointment Time (timed & untimed codes): 50 minutes    Precautions: Standard    Subjective   Date of onset: 5/16/2020   History of current condition - Ad reports: he was lifting a gas can and he reports he hurt his back. Pt reports immediate pain. Pt denies popping sensation. Pt reports he has had back pain in the past due to a different problem where he was carrying something up the stairs and has a collapsed disc and did not receive PT for this in which he just had medication. Pt reports the pain is getting better and ranges from a 4-6/10. Pt reports he sleeps on his side. The pain does not wake him up at night. Pt reports he uses a heating pad to go to sleep. Pt reports the MD gave him muscle relaxers and pt has not tried them yet but he had some from the past that he has been taking and it helps. Pt denies numbness and tingling in the LEs.      Medical History:   Past Medical History:   Diagnosis Date    Mills's esophagus     DDD (degenerative disc disease), cervical     Diarrhea 1/7/2019    Ear ringing     Gallbladder disease     Gastric ulcer with perforation     GERD (gastroesophageal reflux disease)     Hyperlipidemia   "   Intractable vomiting with nausea 1/7/2019    Kidney stone     Meniere disease 2017    Dr TAYLA Mohan    Vertigo        Surgical History:   Ad Lyons  has a past surgical history that includes Cholecystectomy; Vasectomy; Adenoidectomy; Tonsillectomy; Esophagogastroduodenoscopy (1/2013); Colonoscopy w/ polypectomy (1/2013); ERCP (12/26/2016); Colonoscopy (N/A, 12/27/2017); ERCP (N/A, 1/9/2020); ERCP (N/A, 2/17/2020); and Esophagogastroduodenoscopy (N/A, 2/17/2020).    Medications:   Ad has a current medication list which includes the following prescription(s): azelastine, chlorhexidine, cholestyramine (with sugar), cyanocobalamin (vitamin b-12), fluticasone propionate, loperamide hcl, meclizine, omeprazole, ondansetron, sucralfate, tizanidine, and triamterene-hydrochlorothiazide 37.5-25 mg.    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 and is on the computer   Prior Level of Function: no limitations   Current Level of Function: difficulty lifting objects off the floor, bending down to get objects     Pain:  Current 4/10, worst 7/10, best 2/10   Location: right low back    Description: Aching  Aggravating Factors: bending and standing up   Easing Factors: heating pad and muscle relaxers    Pts goals: "I would like a safety brace"     Objective     Structural/Postural Inspection:     Seated: rounded shoulder, forward head posture  Standing: kyphosis, flat back posture    Palpation for Condition: tenderness to palpation at R QL and R sided lumbar paraspinals    Lifting: Pt demonstrates knocked knees with bending forward/knee valgus, rounded back, poor use of LEs    Active Range of Motion (AROM)/Resisted Movements: "     Lumbar/Thoracic AROM (Degrees) Comments   Flexion 45 deg  Pain    Extension 10 deg Minimal pain    Right Side Bend 54 cm/ 25  deg Pain    Left Side Bend 60 cm/ 20 deg Pull of R side   Right Rotation 50 % of normal  Neg for pain    Left Rotation 50% of normal  Pull on the R      Lumbar Special Test:     Lumbar Special Test  Results Comments   Lumbar Compression  Negative.    Lumbar Distraction  Negative.      SI Tests/Screen:    Sacroiliac Results Comments   Forten's Sign  Negative.    TTP of Posterior Iliac ligaments  Negative.    FABERs Negative.    Active SLR with TA activation  Negative. Poor core strength and hip flexor strength demonstrate based on effort     Supine Bridge:  Pain, difficulty lifting indicating weakness     LE MMT Testing:     RLE Strength Grade LLE Strength Grade   Hip Flexion 4+/5 Hip Flexion 4+/5   Hip Extension 4+/5 Hip Extension 4+/5   Hip Abduction 4+/5 Hip Abduction 4+/5   Hip Adduction 4-/5 Hip Adduction 4-/5   Hip Internal Rotation 4+/5 Hip Internal Rotation 4+/5   Hip External Rotation 4+/5 Hip External Rotation 4+/5   Knee Flexion NT Knee Flexion NT   Knee Extension 5/5 Knee Extension 5/5   Ankle Dorsiflexion 5/5 Ankle Dorsiflexion 5/5     Abdominal Testing:     Poor core strength demonstrated with SLR in which patient demonstrate difficulty and increased effort    Muscle Length Tension Testing:     Lumbar/Hip/Knee Right  Left  Comments   Hamstring Length (90/90) -40 -40    Ely's Test + +      Hip Screen:      Hip/Knee/Ankle (Degrees) AROM (Right) PROM (Right) AROM (Left) PROM (Left) Comments   Hip Flexion Limited in seated and supine positioning   Limited in seated and supine positioning     Hip Extension WFL  WFL     Hip Internal Rotation 40  40     Hip External Rotation 30  30       Hip Right Left   FADDIR Test Negative. Negative.     Amb:  Trendelenburg bilaterally, R hip drop > L     Sensation:    Neuro Testing Right  Left Comments   Seated Slump Negative. Negative.    SLR  (Sciatic) Negative. Negative. Neg for radicular symptoms, Stretching of hamstrings     Joint Accessory: hypomobility of lumbar and thoracic spine, tenderness with joint assessment of lumbar spine along spinous process with reports of pain in R QL area with joint assessment along lumbar spine     Outcome Measures:     Oswestry Disability Index: 10/50= 20% disability     TREATMENT   Home Exercises and Patient Education Provided    Education provided:   - PT demonstrated HEP exercises. Pt demonstrated good understanding with returned demonstration  - PT goals and POC  - Shift to applying ice verse heat 2/2 history of acute injury  - Reasoning POC was suggested to be set at 2x/wk for 4 weeks then 1x/wk for 4 weeks; however, pt was only able to come 1x/wk ; therefore, POC was adjusted based on patient scheduling     Written Home Exercises Provided: yes.  Exercises were reviewed and Ad was able to demonstrate them prior to the end of the session.  Ad demonstrated good  understanding of the education provided.     See EMR under Patient Instructions for exercises provided 5/21/2020.    Assessment   Ad is a 65 y.o. male referred to outpatient Physical Therapy with a medical diagnosis of acute R sided low back pain without sciatica and muscular deconditioning. Pt presents with c/o acute injury to low back region on Saturday 5/16/2020 when he was moving a gas can. Pt presents with signs and symptoms consistent with a R QL strain. PT had pt demonstrate lifting mechanics in which patient presented with rounded back, knocked knees/ genu valgum, poor use of LEs and increased use of back indicating poor lifting mechanics which likely contributed to injury with lifting. Pt also presents with decreased lumbar AROM with pain reported with movements of lumbar spine, poor standing and seated posture, decreased BLE strength, poor core and trunk strength based on observation of movements, decreased flexibility of BLEs, and  trendelenburg sign bilaterally during gait indicating weak glut med. Pt also demonstrates limited lumbar and hip flexion ROM in spine position with DKTC. These deficits above have lead to a disability score of 20% based on YEN. Pt will benefit from skilled PT services to address the problems listed above to improve low back pain symptoms and reduce risk for future injury which will lead to improved lifting mechanics, ability to lift objects from the floor, and engage in outdoor chores. PT educated patient on reasoning for a POC selection of 2x/wk for 4 weeks followed by 1x/wk for 4 weeks; however, pt is only able to attend therapy 1x/wk; therefore, POC was adjusted to fit patient needs.     Pt prognosis is Good.   Pt will benefit from skilled outpatient Physical Therapy to address the deficits stated above and in the chart below, provide pt/family education, and to maximize pt's level of independence.     Plan of care discussed with patient: Yes  Pt's spiritual, cultural and educational needs considered and patient is agreeable to the plan of care and goals as stated below:     Anticipated Barriers for therapy: unable to attend therapy 2x/wk, history of low back pain but for a different reason    Medical Necessity is demonstrated by the following  History  Co-morbidities and personal factors that may impact the plan of care Co-morbidities:   HTN    Personal Factors:   no deficits     low   Examination  Body Structures and Functions, activity limitations and participation restrictions that may impact the plan of care Body Regions:   back  lower extremities    Body Systems:    gross symmetry  ROM  strength  gross coordinated movement  gait  motor control    Participation Restrictions:   Difficulty with lifting objects from the floor     Activity limitations:   Learning and applying knowledge  no deficits    General Tasks and Commands  no deficits    Communication  no deficits    Mobility  lifting and carrying  objects    Self care  no deficits    Domestic Life  doing outdoor chores    Interactions/Relationships  no deficits    Life Areas  no deficits    Community and Social Life  community life  recreation and leisure         high   Clinical Presentation stable and uncomplicated low   Decision Making/ Complexity Score: low     Goals:    STG  Weeks/Visits Date Established  Goal Status    1.Pt will demonstrate compliance and independence with HEP to improve therapy outcomes  3 weeks 5/21/2020      2. Pt will increase lumbar AROM flexion to >/= 50 deg  And extension to >/= 15 deg to improve ability to lift objects from floor  3 weeks 5/21/2020      3.Pt will demonstrate proper lifting mechanics with min tactile and verbal cues to decrease risk for future injury  3 weeks 5/21/2020      4. Pt will demonstrate improved hamstring flexibility with </= 30 deg of knee flexion on 90/90 test  3 weeks 5/21/2020      5.Pt will report </= 15 % disability on YEN to improve return to PLOF of performing outdoor duties  3 weeks 5/21/2020        LTG Weeks/Visits Date Established  Goal Status    1. Pt will increase BLE strength to 5/5 to improve functional mobility  6 weeks 5/21/2020      2. Pt will increase lumbar AROM flexion to >/= 60 deg  And extension to >/= 20 deg to improve ability to lift objects from floor  6 weeks 5/21/2020        3.Pt will demonstrate proper lifting mechanics with min tactile and verbal cues to decrease risk for future injury  6 weeks 5/21/2020      4.Pt will demonstrate improved hamstring flexibility with </= 20 deg of knee flexion on 90/90 test  6 weeks 5/21/2020      5.Pt will report </= 10 % disability on YEN to improve return to PLOF of performing outdoor duties  6 weeks 5/21/2020          Plan   Plan of care Certification: 5/21/2020 to 7/17/2020.    Outpatient Physical Therapy 1 times weekly for 6 weeks to include the following interventions: Cervical/Lumbar Traction, Electrical Stimulation TENs, Manual  Therapy, Moist Heat/ Ice, Neuromuscular Re-ed, Patient Education, Therapeutic Activites, Therapeutic Exercise and Ultrasound. Pt may be seen by PTA as part of the rehabilitation team.    Jaymie Benitez, PT

## 2020-05-25 ENCOUNTER — DOCUMENTATION ONLY (OUTPATIENT)
Dept: REHABILITATION | Facility: HOSPITAL | Age: 65
End: 2020-05-25

## 2020-05-28 ENCOUNTER — TELEPHONE (OUTPATIENT)
Dept: URGENT CARE | Facility: CLINIC | Age: 65
End: 2020-05-28

## 2020-05-28 ENCOUNTER — CLINICAL SUPPORT (OUTPATIENT)
Dept: URGENT CARE | Facility: CLINIC | Age: 65
End: 2020-05-28
Payer: MEDICARE

## 2020-05-28 ENCOUNTER — NURSE TRIAGE (OUTPATIENT)
Dept: ADMINISTRATIVE | Facility: CLINIC | Age: 65
End: 2020-05-28

## 2020-05-28 VITALS
DIASTOLIC BLOOD PRESSURE: 74 MMHG | HEART RATE: 58 BPM | BODY MASS INDEX: 27.98 KG/M2 | TEMPERATURE: 97 F | SYSTOLIC BLOOD PRESSURE: 120 MMHG | RESPIRATION RATE: 18 BRPM | WEIGHT: 218 LBS | OXYGEN SATURATION: 97 % | HEIGHT: 74 IN

## 2020-05-28 DIAGNOSIS — H00.025 HORDEOLUM INTERNUM OF LEFT LOWER EYELID: Primary | ICD-10-CM

## 2020-05-28 PROCEDURE — 99214 PR OFFICE/OUTPT VISIT, EST, LEVL IV, 30-39 MIN: ICD-10-PCS | Mod: S$GLB,,, | Performed by: NURSE PRACTITIONER

## 2020-05-28 PROCEDURE — 99214 OFFICE O/P EST MOD 30 MIN: CPT | Mod: S$GLB,,, | Performed by: NURSE PRACTITIONER

## 2020-05-28 RX ORDER — GENTAMICIN SULFATE 0.3 %
0.5 OINTMENT (GRAM) OPHTHALMIC (EYE) 3 TIMES DAILY
Qty: 3.5 G | Refills: 0 | Status: SHIPPED | OUTPATIENT
Start: 2020-05-28 | End: 2020-12-03

## 2020-05-28 NOTE — PATIENT INSTRUCTIONS
Sty (or Stye)  A sty is an infection of the oil gland of the eyelid. It may develop into a small pocket of pus (abscess). This can cause pain, redness, and swelling. In early stages, styes are treated with antibiotic cream, eye drops, or warm packs (small towels soaked in warm water). More severe cases may need to be opened and drained by a health care provider.  Home care  · Eye drops or ointment are usually prescribed to treat the infection. Use these as directed.   ¨ Artificial tears may also be used to lubricate the eye and make it more comfortable. These may be purchased without a prescription.   ¨ Talk to your health care provider before using any over-the-counter treatment for a sty.  · Apply a warm, damp towel to the affected eye for at least 5 minutes, 3 to 4 times a day for a week. Warm compresses open the pores and speed the healing. If the compresses are too hot, they may burn your eyelid.  · Sometimes the sty will drain with this treatment alone. If this happens, continue the antibiotic until all the redness and swelling are gone.  · Wash your hands before and after touching the infected eye to avoid spreading the infection.  · Do not squeeze or try to puncture the sty.  Follow-up care  Follow up with your health care provider, or as advised.   When to seek medical advice  Call your health care provider right away if you have:  · Increase in swelling or redness around the eyelid after 48 to 72 hours  · Increase in eye pain or the eyelid blisters  · Increase in warmth--the eyelid feels hot  · Drainage of blood or thick pus from the sty  · Blister on the eyelid  · Inability to open the eyelid due to swelling  · Fever  ¨ 1 degree above your normal temperature lasting for 24 to 48 hours, or  ¨ Whatever your health care provider told you to report based on your medical condition  · Vision changes  · Headache or stiff neck  · Recurrence of the sty  Date Last Reviewed: 6/14/2015  © 6934-4818 [employer]. [address] [place] [address], [place] [address]. All rights reserved. This information is not intended as a substitute for professional medical care. Always follow your healthcare professional's instructions.

## 2020-05-28 NOTE — TELEPHONE ENCOUNTER
[facility] called and stated they do not have the gentamicin ointment only the solution or erythromycin ointment, per Nelli xie to change to erythromycin ointment

## 2020-05-28 NOTE — PROGRESS NOTES
"Subjective:       Patient ID: Ad Lyons is a 65 y.o. male.    Vitals:  height is 6' 2" (1.88 m) and weight is 98.9 kg (218 lb). His oral temperature is 97.3 °F (36.3 °C). His blood pressure is 120/74 and his pulse is 58 (abnormal). His respiration is 18 and oxygen saturation is 97%.     Chief Complaint: Stye    Patient complains of redness, swelling and tenderness with drainage to left lower eyelid for a few days. Reports drainage from left eye. Denies changes in vision, foreign body sensation, contact wearer, metal grinding.       Constitution: Negative for chills, fatigue and fever.   HENT: Negative for congestion and sore throat.    Neck: Negative for painful lymph nodes.   Cardiovascular: Negative for chest pain and leg swelling.   Eyes: Positive for eye discharge, eye pain, eye redness and eyelid swelling. Negative for eye trauma, foreign body in eye, eye itching, photophobia, vision loss, double vision and blurred vision.   Respiratory: Negative for cough and shortness of breath.    Gastrointestinal: Negative for nausea, vomiting and diarrhea.   Genitourinary: Negative for dysuria, frequency and urgency.   Musculoskeletal: Negative for joint pain, joint swelling, muscle cramps and muscle ache.   Skin: Negative for color change, pale and rash.   Allergic/Immunologic: Negative for seasonal allergies.   Neurological: Negative for dizziness, history of vertigo, light-headedness, passing out and headaches.   Hematologic/Lymphatic: Negative for swollen lymph nodes, easy bruising/bleeding and history of blood clots. Does not bruise/bleed easily.   Psychiatric/Behavioral: Negative for nervous/anxious, sleep disturbance and depression. The patient is not nervous/anxious.        Objective:      Physical Exam   Constitutional: He is oriented to person, place, and time. He appears well-developed and well-nourished. He is cooperative.  Non-toxic appearance. He does not appear ill. No distress.   HENT:   Head: " Normocephalic and atraumatic.   Right Ear: Hearing, tympanic membrane, external ear and ear canal normal.   Left Ear: Hearing, tympanic membrane, external ear and ear canal normal.   Nose: Nose normal. No mucosal edema, rhinorrhea or nasal deformity. No epistaxis. Right sinus exhibits no maxillary sinus tenderness and no frontal sinus tenderness. Left sinus exhibits no maxillary sinus tenderness and no frontal sinus tenderness.   Mouth/Throat: Uvula is midline, oropharynx is clear and moist and mucous membranes are normal. No trismus in the jaw. Normal dentition. No uvula swelling. No posterior oropharyngeal erythema.   Eyes: Pupils are equal, round, and reactive to light. Conjunctivae, EOM and lids are normal. Right eye exhibits no discharge. Left eye exhibits hordeolum. Left eye exhibits no discharge. No scleral icterus.       Neck: Trachea normal, normal range of motion, full passive range of motion without pain and phonation normal. Neck supple.   Cardiovascular: Normal rate, regular rhythm, normal heart sounds, intact distal pulses and normal pulses.   Pulmonary/Chest: Effort normal and breath sounds normal. No respiratory distress.   Abdominal: Soft. Normal appearance and bowel sounds are normal. He exhibits no distension, no pulsatile midline mass and no mass. There is no tenderness.   Musculoskeletal: Normal range of motion. He exhibits no edema or deformity.   Neurological: He is alert and oriented to person, place, and time. He exhibits normal muscle tone. Coordination normal. GCS eye subscore is 4. GCS verbal subscore is 5. GCS motor subscore is 6.   Skin: Skin is warm, dry, intact, not diaphoretic and not pale.   Psychiatric: He has a normal mood and affect. His speech is normal and behavior is normal. Judgment and thought content normal. Cognition and memory are normal.   Nursing note and vitals reviewed.        Assessment:       1. Hordeolum internum of left lower eyelid        Plan:       Instructed  to apply warm compresses to eye. Return to clinic if no improvement.     Hordeolum internum of left lower eyelid    Other orders  -     Discontinue: tobramycin sulfate 0.3% (TOBREX) 0.3 % ophthalmic ointment; Place into the right eye 3 (three) times daily.  Dispense: 3.5 g; Refill: 0  -     gentamicin (GARAMYCIN) 0.3 % (3 mg/gram) ophthalmic ointment; Place 0.5 inches into the left eye 3 (three) times daily.  Dispense: 3.5 g; Refill: 0

## 2020-05-28 NOTE — TELEPHONE ENCOUNTER
Pt states he has been having drainage from his left eye for the past couple of days, he states he has a little bump on his left eyelid that itches, denies vision problems or red sclera, advised him to see a provider today, he states he will go to an UC, advised him to call back with any worsening symptoms, needs, or concerns, caller agreed     Reason for Disposition   Eyelid (outer) is very red and painful (or tender to touch)    Additional Information   Negative: Eye exposure to chemical or fumes   Negative: Redness of white of eye (sclera), but no pus or only a small amount of brief pus   Negative: SEVERE pain (e.g., excruciating)   Negative: Patient sounds very sick or weak to the triager    Protocols used: EYE - PUS OR [id number]

## 2020-06-16 ENCOUNTER — TELEPHONE (OUTPATIENT)
Dept: OTOLARYNGOLOGY | Facility: CLINIC | Age: 65
End: 2020-06-16

## 2020-06-16 NOTE — TELEPHONE ENCOUNTER
Spoke with patient who cancel all appointments with ENT due to insurance will not pay for Cochlear implant surgery ,

## 2020-08-24 DIAGNOSIS — R42 VERTIGO: ICD-10-CM

## 2020-08-24 RX ORDER — MECLIZINE HYDROCHLORIDE 25 MG/1
TABLET ORAL
Qty: 60 TABLET | Refills: 0 | Status: SHIPPED | OUTPATIENT
Start: 2020-08-24 | End: 2020-10-21 | Stop reason: SDUPTHER

## 2020-08-24 NOTE — PROGRESS NOTES
Refill Routing Note   Medication(s) are not appropriate for processing by [facility]:       - Outside of protocol           Medication reconciliation completed: No      Automatic Epic Generated Protocol Data:        Requested Prescriptions   Pending Prescriptions Disp Refills    meclizine (ANTIVERT) 25 mg tablet [Pharmacy Med Name: Meclizine HCl 25 MG Oral Tablet] 60 tablet 0     Sig: Take 1 tablet by mouth three times daily as needed       There is no refill protocol information for this order           Appointments  past 12m or future 3m with PCP    Date Provider   Last Visit   5/18/2020 Jimmie Carbajal MD   Next Visit   Visit date not found Jimmie Carbajal MD   ED visits in past 90 days: 0     Note composed:11:28 AM 08/24/2020

## 2020-08-28 NOTE — TELEPHONE ENCOUNTER
Patient notified his Meclizine was filled 8/24- he needs refill of Triamterene. I called [facility] and he has a refill left and will get it ready.

## 2020-10-12 ENCOUNTER — OFFICE VISIT (OUTPATIENT)
Dept: PRIMARY CARE CLINIC | Facility: CLINIC | Age: 65
End: 2020-10-12
Payer: MEDICARE

## 2020-10-12 VITALS
HEART RATE: 66 BPM | RESPIRATION RATE: 16 BRPM | TEMPERATURE: 98 F | SYSTOLIC BLOOD PRESSURE: 130 MMHG | DIASTOLIC BLOOD PRESSURE: 73 MMHG | OXYGEN SATURATION: 96 %

## 2020-10-12 DIAGNOSIS — Z03.818 ENCOUNTER FOR OBSERVATION FOR SUSPECTED EXPOSURE TO OTHER BIOLOGICAL AGENTS RULED OUT: ICD-10-CM

## 2020-10-12 PROCEDURE — 3075F PR MOST RECENT SYSTOLIC BLOOD PRESS GE 130-139MM HG: ICD-10-PCS | Mod: S$GLB,,, | Performed by: PHYSICIAN ASSISTANT

## 2020-10-12 PROCEDURE — 1101F PR PT FALLS ASSESS DOC 0-1 FALLS W/OUT INJ PAST YR: ICD-10-PCS | Mod: S$GLB,,, | Performed by: PHYSICIAN ASSISTANT

## 2020-10-12 PROCEDURE — 3078F DIAST BP <80 MM HG: CPT | Mod: S$GLB,,, | Performed by: PHYSICIAN ASSISTANT

## 2020-10-12 PROCEDURE — 3075F SYST BP GE 130 - 139MM HG: CPT | Mod: S$GLB,,, | Performed by: PHYSICIAN ASSISTANT

## 2020-10-12 PROCEDURE — U0003 INFECTIOUS AGENT DETECTION BY NUCLEIC ACID (DNA OR RNA); SEVERE ACUTE RESPIRATORY SYNDROME CORONAVIRUS 2 (SARS-COV-2) (CORONAVIRUS DISEASE [COVID-19]), AMPLIFIED PROBE TECHNIQUE, MAKING USE OF HIGH THROUGHPUT TECHNOLOGIES AS DESCRIBED BY CMS-2020-01-R: HCPCS

## 2020-10-12 PROCEDURE — 3078F PR MOST RECENT DIASTOLIC BLOOD PRESSURE < 80 MM HG: ICD-10-PCS | Mod: S$GLB,,, | Performed by: PHYSICIAN ASSISTANT

## 2020-10-12 PROCEDURE — 1101F PT FALLS ASSESS-DOCD LE1/YR: CPT | Mod: S$GLB,,, | Performed by: PHYSICIAN ASSISTANT

## 2020-10-12 PROCEDURE — 1159F MED LIST DOCD IN RCRD: CPT | Mod: S$GLB,,, | Performed by: PHYSICIAN ASSISTANT

## 2020-10-12 PROCEDURE — 99203 OFFICE O/P NEW LOW 30 MIN: CPT | Mod: S$GLB,,, | Performed by: PHYSICIAN ASSISTANT

## 2020-10-12 PROCEDURE — 99203 PR OFFICE/OUTPT VISIT, NEW, LEVL III, 30-44 MIN: ICD-10-PCS | Mod: S$GLB,,, | Performed by: PHYSICIAN ASSISTANT

## 2020-10-12 PROCEDURE — 1159F PR MEDICATION LIST DOCUMENTED IN MEDICAL RECORD: ICD-10-PCS | Mod: S$GLB,,, | Performed by: PHYSICIAN ASSISTANT

## 2020-10-12 NOTE — PROGRESS NOTES
Subjective:      Time seen by provider: 11:58 AM on 10/12/2020    Ad Lyons is a 65 y.o. male with a PMHx of HLD who presents for an evaluation of possible COVID-19. The patient complains of sore throat, congestion, and fatigue since yesterday. The patient reports recently returning home from a trip out of the state. The patient admits to direct COVID-19 contact. The patient denies any other symptoms at this time. PSHx of adenoidectomy, cholecystectomy, and tonsillectomy.    Review of Systems   Constitutional: Positive for fatigue. Negative for activity change, appetite change and fever.   HENT: Positive for congestion and sore throat. Negative for rhinorrhea.    Respiratory: Negative for cough, chest tightness, shortness of breath and wheezing.    Cardiovascular: Negative for chest pain and palpitations.   Gastrointestinal: Negative for diarrhea, nausea and vomiting.   Musculoskeletal: Negative for arthralgias and myalgias.   Skin: Negative for rash.   Neurological: Negative for weakness, light-headedness and headaches.       Objective:      Physical Exam  Vitals signs and nursing note reviewed.   Constitutional:       General: He is not in acute distress.     Appearance: He is well-developed. He is not diaphoretic.   HENT:      Head: Normocephalic and atraumatic.      Nose: Nose normal.   Eyes:      Conjunctiva/sclera: Conjunctivae normal.   Neck:      Musculoskeletal: Normal range of motion.   Cardiovascular:      Rate and Rhythm: Normal rate and regular rhythm.      Heart sounds: Normal heart sounds. No murmur.   Pulmonary:      Effort: Pulmonary effort is normal. No respiratory distress.      Breath sounds: Normal breath sounds. No wheezing.   Musculoskeletal: Normal range of motion.   Skin:     General: Skin is warm and dry.   Neurological:      Mental Status: He is alert and oriented to person, place, and time.         Assessment and Plan:      Diagnoses and all orders for this visit:    Encounter for  observation for suspected exposure to other biological agents ruled out  -     COVID-19 Routine Screening  - Discharge home and await results.   - Return to clinic or ED for new or worsening symptoms.   - Follow-up with PCP as needed.     Scribe Attestation:   I, Liz Rodriguez, am scribing for, and in the presence of, Tiffanie Scott PA-C. I performed the above scribed service and the documentation accurately describes the services I performed. I attest to the accuracy of the note.    I, Tiffanie Scott PA-C, personally performed the services described in this documentation. All medical record entries made by the scribe were at my direction and in my presence.  I have reviewed the chart and agree that the record reflects my personal performance and is accurate and complete. Tiffanie Scott PA-C.  2:15 PM 10/12/2020

## 2020-10-13 LAB — SARS-COV-2 RNA RESP QL NAA+PROBE: NOT DETECTED

## 2020-10-21 ENCOUNTER — TELEPHONE (OUTPATIENT)
Dept: FAMILY MEDICINE | Facility: CLINIC | Age: 65
End: 2020-10-21

## 2020-10-21 DIAGNOSIS — R42 VERTIGO: ICD-10-CM

## 2020-10-21 RX ORDER — MECLIZINE HYDROCHLORIDE 25 MG/1
25 TABLET ORAL 3 TIMES DAILY PRN
Qty: 60 TABLET | Refills: 2 | Status: SHIPPED | OUTPATIENT
Start: 2020-10-21 | End: 2021-04-25 | Stop reason: SDUPTHER

## 2020-10-21 NOTE — TELEPHONE ENCOUNTER
----- Message from Reese Macias sent at 10/21/2020  8:53 AM CDT -----  Type:  RX Refill Request    Who Called:  Patient  Refill or New Rx: Refill  RX Name and Strength:  meclizine (ANTIVERT) 25 mg tablet      How is the patient currently taking it? (ex. 1XDay):   Take 1 tablet by mouth three times daily as needed  Is this a 30 day or 90 day RX:  90    Preferred Pharmacy with phone number:        [facility]  [place] - [address]  [address] [place] [address]  Phone: [phone] Fax: [phone]      Local or Mail Order Local  Best Call Back Number:    Additional Information: [phone]

## 2020-10-21 NOTE — TELEPHONE ENCOUNTER
----- Message from Hira Ryan sent at 10/21/2020  4:17 PM CDT -----  Regarding: pt  Type:  RX Refill Request    Who Called:  pt  Refill or New Rx:  refill  RX Name and Strength:  meclizine (ANTIVERT) 25 mg tablet 60 tablet 0 8/24/2020  How is the patient currently taking it? (ex. 1XDay):  Sig: Take 1 tablet by mouth three times daily as needed   Is this a 30 day or 90 day RX:  90  Preferred Pharmacy with phone number:    [facility] - [place], [place] - [address]  [address]  [place] [address]  Phone: [phone] Fax: [phone]    Local or Mail Order:  local  Ordering Provider:  Dr Raven Kumar Call Back Number:  [phone]  Additional Information:  pt is out of med

## 2020-10-26 DIAGNOSIS — R42 VERTIGO: ICD-10-CM

## 2020-10-26 DIAGNOSIS — J30.9 ALLERGIC SINUSITIS: ICD-10-CM

## 2020-10-26 NOTE — TELEPHONE ENCOUNTER
----- Message from Kristina Kaur sent at 10/26/2020  1:41 PM CDT -----  Contact: pt  Type:  RX Refill Request    Who Called:  patient   Refill or New Rx:  refill  RX Name and Strength:  meclizine (ANTIVERT) 25 mg tablet  How is the patient currently taking it? (ex. 1XDay):    Is this a 30 day or 90 day RX:  90  Preferred Pharmacy with phone number:  [address];  Local or Mail Order: local  Ordering Provider:  Dr Raven Kumar Call Back Number:  [phone]  Additional Information:  Patient called his pharmacy and they do not have a refill request.

## 2020-10-26 NOTE — TELEPHONE ENCOUNTER
Care Due:                  Date            Visit Type   Department     Provider  --------------------------------------------------------------------------------                                ESTABLISHED                              PATIENT      [facility]  Last Visit: 05-      EXTENDED     MEDICINE       Jimmie Carbajal  Next Visit: None Scheduled  None         None Found                                                            Last  Test          Frequency    Reason                     Performed    Due Date  --------------------------------------------------------------------------------    Cr..........  6 months...  triamterene-hydrochloroth  05- 11-                             iazide...................    K...........  6 months...  triamterene-hydrochloroth  05- 11-                             iazide...................    Na..........  6 months...  triamterene-hydrochloroth  05- 11-                             iazide...................    Powered by [employer]. Reference number: [id number]. 10/26/2020 2:09:58 PM   CDT

## 2020-10-26 NOTE — TELEPHONE ENCOUNTER
----- Message from Kristina Kaur sent at 10/26/2020  1:44 PM CDT -----  Regarding: refill  Contact: pt  Type:  RX Refill Request    Who Called:  patient  Refill or New Rx:  refill  RX Name and Strength:  fluticasone propionate (FLONASE) 50 mcg/actuation nasal spray  azelastine (ASTELIN) 137 mcg (0.1 %) nasal spray  How is the patient currently taking it? (ex. 1XDay):    Is this a 30 day or 90 day RX:  90  Preferred Pharmacy with phone number:  #- [facility] [address] [place], [place] - [address] [place] [address];  Local or Mail Order:  local  Ordering Provider:  Dr Raven Kumar Call Back Number:    Additional Information:  Patient is out of these two medications

## 2020-10-27 RX ORDER — MECLIZINE HYDROCHLORIDE 25 MG/1
25 TABLET ORAL 3 TIMES DAILY PRN
Qty: 60 TABLET | Refills: 2 | OUTPATIENT
Start: 2020-10-27

## 2020-10-27 NOTE — PROGRESS NOTES
Refill Routing Note   Medication(s) are not appropriate for processing by [facility] for the following reason(s):     - Outside of protocol    [facility] actions taken in this encounter: Route          Medication reconciliation completed: No   Automatic Epic Generated Protocol Data:        Requested Prescriptions   Pending Prescriptions Disp Refills    meclizine (ANTIVERT) 25 mg tablet 60 tablet 2     Sig: Take 1 tablet (25 mg total) by mouth 3 (three) times daily as needed.       There is no refill protocol information for this order           Appointments  past 12m or future 3m with PCP    Date Provider   Last Visit   5/18/2020 Jimmie Carbajal MD   Next Visit   10/26/2020 Jimmie Carbajal MD   ED visits in past 90 days: 0        Note composed:11:30 PM 10/26/2020

## 2020-10-28 RX ORDER — FLUTICASONE PROPIONATE 50 MCG
2 SPRAY, SUSPENSION (ML) NASAL DAILY
Qty: 48 G | Refills: 1 | Status: SHIPPED | OUTPATIENT
Start: 2020-10-28 | End: 2022-01-06

## 2020-10-28 NOTE — PROGRESS NOTES
Refill Authorization Note   Ad Lyons is requesting a refill authorization.  Brief assessment and rationale for refill: Approve     Medication Therapy Plan: [facility]    Medication reconciliation completed: No   Comments:   Orders Placed This Encounter    fluticasone propionate (FLONASE) 50 mcg/actuation nasal spray      Requested Prescriptions   Signed Prescriptions Disp Refills    fluticasone propionate (FLONASE) 50 mcg/actuation nasal spray 48 g 1     Si sprays (100 mcg total) by Each Nostril route Daily.       Ear, Nose, and Throat: Nasal Preparations - Corticosteroids Passed - 10/26/2020  2:09 PM        Passed - Patient is at least 18 years old        Passed - Office visit in past 12 months or future 90 days     Recent Outpatient Visits            2 weeks ago Encounter for observation for suspected exposure to other biological agents ruled out    [facility]    5 months ago Encounter for preventive health examination    [facility] [place] Carbajal MD    8 months ago Sensorineural hearing loss (SNHL) of left ear with restricted hearing of right ear    [facility] Aris Miller MD    8 months ago Mills's esophagus without dysplasia    [facility] Gastroenterology Adrian Castillo MD    9 months ago Sensorineural hearing loss (SNHL) of left ear with restricted hearing of right ear    [facility] Aris Miller MD                        Appointments  past 12m or future 3m with PCP    Date Provider   Last Visit   2020 Jimmie Carbajal MD   Next Visit   Visit date not found Jimmie Carbajal MD   ED visits in past 90 days: 0     Note composed:12:10 PM 10/28/2020

## 2020-10-30 DIAGNOSIS — J30.9 ALLERGIC SINUSITIS: ICD-10-CM

## 2020-10-30 NOTE — TELEPHONE ENCOUNTER
No new care gaps identified.  Powered by [employer]. Reference number: [id number]. 10/30/2020 11:23:11 AM   CHRISTIANO

## 2020-10-30 NOTE — TELEPHONE ENCOUNTER
----- Message from Torsten Hernández sent at 10/30/2020 11:03 AM CDT -----  Regarding: refill  Contact: patient  Type:  RX Refill Request    Who Called:  patient  Refill or New Rx:  new  RX Name and Strength:  azelastine (ASTELIN) 137 mcg (0.1 %) nasal spray  How is the patient currently taking it? (ex. 1XDay):  1 spray (137 mcg total) by Nasal route 2 (two) times daily. - Nasal  Is this a 30 day or 90 day RX:  90 mL 3 7/1/2019   Preferred Pharmacy with phone number:    [facility] - [place], [place] - [address] [address]  [address]  [place] [address]  Phone: [phone] Fax: [phone]    Ordering Provider:  Raven Kumar Call Back Number:  [phone]  Additional Information:  n/a

## 2020-10-31 RX ORDER — AZELASTINE 1 MG/ML
1 SPRAY, METERED NASAL 2 TIMES DAILY PRN
Qty: 90 ML | Refills: 1 | Status: SHIPPED | OUTPATIENT
Start: 2020-10-31 | End: 2022-01-06

## 2020-11-01 NOTE — PROGRESS NOTES
Refill Authorization Note   Ad Lyons is requesting a refill authorization.  Brief assessment and rationale for refill: Approve     Medication Therapy Plan: [facility]    Medication reconciliation completed: No   Comments:   Orders Placed This Encounter    azelastine (ASTELIN) 137 mcg (0.1 %) nasal spray      Requested Prescriptions   Signed Prescriptions Disp Refills    azelastine (ASTELIN) 137 mcg (0.1 %) nasal spray 90 mL 1     Si spray (137 mcg total) by Nasal route 2 (two) times daily as needed for Rhinitis.       Ear, Nose, and Throat: Nasal Preparations - Antiallergy Passed - 10/30/2020 11:22 AM        Passed - Patient is at least 18 years old        Passed - Office visit in past 12 months or future 90 days     Recent Outpatient Visits            2 weeks ago Encounter for observation for suspected exposure to other biological agents ruled out    [facility]    5 months ago Encounter for preventive health examination    [facility] [place] Carbajal MD    8 months ago Sensorineural hearing loss (SNHL) of left ear with restricted hearing of right ear    [facility] Aris Miller MD    8 months ago Mills's esophagus without dysplasia    [facility] Adrian Castillo MD    9 months ago Sensorineural hearing loss (SNHL) of left ear with restricted hearing of right ear    [facility] Aris Miller MD                        Appointments  past 12m or future 3m with PCP    Date Provider   Last Visit   2020 Jimmie Carbajal MD   Next Visit   Visit date not found Jimmie Carbajal MD   ED visits in past 90 days: 0     Note composed:7:35 PM 10/31/2020

## 2020-11-20 ENCOUNTER — PES CALL (OUTPATIENT)
Dept: ADMINISTRATIVE | Facility: CLINIC | Age: 65
End: 2020-11-20

## 2020-12-03 ENCOUNTER — OFFICE VISIT (OUTPATIENT)
Dept: FAMILY MEDICINE | Facility: CLINIC | Age: 65
End: 2020-12-03
Payer: MEDICARE

## 2020-12-03 VITALS
RESPIRATION RATE: 18 BRPM | HEIGHT: 74 IN | DIASTOLIC BLOOD PRESSURE: 70 MMHG | SYSTOLIC BLOOD PRESSURE: 112 MMHG | TEMPERATURE: 98 F | WEIGHT: 222 LBS | BODY MASS INDEX: 28.49 KG/M2 | OXYGEN SATURATION: 96 % | HEART RATE: 64 BPM

## 2020-12-03 DIAGNOSIS — G89.29 CHRONIC BILATERAL LOW BACK PAIN, UNSPECIFIED WHETHER SCIATICA PRESENT: ICD-10-CM

## 2020-12-03 DIAGNOSIS — M54.50 CHRONIC BILATERAL LOW BACK PAIN, UNSPECIFIED WHETHER SCIATICA PRESENT: ICD-10-CM

## 2020-12-03 DIAGNOSIS — Z00.00 ENCOUNTER FOR PREVENTIVE HEALTH EXAMINATION: Primary | ICD-10-CM

## 2020-12-03 DIAGNOSIS — I70.0 AORTIC ATHEROSCLEROSIS: ICD-10-CM

## 2020-12-03 PROBLEM — R35.1 BPH ASSOCIATED WITH NOCTURIA: Status: RESOLVED | Noted: 2019-01-07 | Resolved: 2020-12-03

## 2020-12-03 PROBLEM — R29.898 DECREASED STRENGTH OF LOWER EXTREMITY: Status: RESOLVED | Noted: 2020-05-21 | Resolved: 2020-12-03

## 2020-12-03 PROBLEM — N40.1 BPH ASSOCIATED WITH NOCTURIA: Status: RESOLVED | Noted: 2019-01-07 | Resolved: 2020-12-03

## 2020-12-03 PROBLEM — K80.50 CHOLEDOCHOLITHIASIS: Status: RESOLVED | Noted: 2020-01-08 | Resolved: 2020-12-03

## 2020-12-03 PROCEDURE — 1126F AMNT PAIN NOTED NONE PRSNT: CPT | Mod: S$GLB,,, | Performed by: NURSE PRACTITIONER

## 2020-12-03 PROCEDURE — 1126F PR PAIN SEVERITY QUANTIFIED, NO PAIN PRESENT: ICD-10-PCS | Mod: S$GLB,,, | Performed by: NURSE PRACTITIONER

## 2020-12-03 PROCEDURE — 3288F PR FALLS RISK ASSESSMENT DOCUMENTED: ICD-10-PCS | Mod: S$GLB,,, | Performed by: NURSE PRACTITIONER

## 2020-12-03 PROCEDURE — 3288F FALL RISK ASSESSMENT DOCD: CPT | Mod: S$GLB,,, | Performed by: NURSE PRACTITIONER

## 2020-12-03 PROCEDURE — 1101F PT FALLS ASSESS-DOCD LE1/YR: CPT | Mod: S$GLB,,, | Performed by: NURSE PRACTITIONER

## 2020-12-03 PROCEDURE — G0439 PR MEDICARE ANNUAL WELLNESS SUBSEQUENT VISIT: ICD-10-PCS | Mod: S$GLB,,, | Performed by: NURSE PRACTITIONER

## 2020-12-03 PROCEDURE — G0439 PPPS, SUBSEQ VISIT: HCPCS | Mod: S$GLB,,, | Performed by: NURSE PRACTITIONER

## 2020-12-03 PROCEDURE — 1101F PR PT FALLS ASSESS DOC 0-1 FALLS W/OUT INJ PAST YR: ICD-10-PCS | Mod: S$GLB,,, | Performed by: NURSE PRACTITIONER

## 2020-12-03 PROCEDURE — 3008F PR BODY MASS INDEX (BMI) DOCUMENTED: ICD-10-PCS | Mod: S$GLB,,, | Performed by: NURSE PRACTITIONER

## 2020-12-03 PROCEDURE — 3008F BODY MASS INDEX DOCD: CPT | Mod: S$GLB,,, | Performed by: NURSE PRACTITIONER

## 2020-12-03 NOTE — PROGRESS NOTES
"  Ad Lyons presented for a  Medicare AWV and comprehensive Health Risk Assessment today. The following components were reviewed and updated:    · Medical history  · Family History  · Social history  · Allergies and Current Medications  · Health Risk Assessment  · Health Maintenance  · Care Team         ** See Completed Assessments for Annual Wellness Visit within the encounter summary.**         The following assessments were completed:  · Living Situation  · CAGE  · Depression Screening  · Timed Get Up and Go  · Whisper Test  · Cognitive Function Screening  · Nutrition Screening  · ADL Screening  · PAQ Screening            Vitals:    12/03/20 1433   BP: 112/70   BP Location: Left arm   Patient Position: Sitting   BP Method: Large (Manual)   Pulse: 64   Resp: 18   Temp: 97.9 °F (36.6 °C)   TempSrc: Temporal   SpO2: 96%   Weight: 100.7 kg (222 lb 0.1 oz)   Height: 6' 2" (1.88 m)     Body mass index is 28.5 kg/m².  Physical Exam  Vitals signs and nursing note reviewed.   Constitutional:       General: He is not in acute distress.     Appearance: He is well-developed. He is not diaphoretic.   HENT:      Head: Normocephalic and atraumatic.   Eyes:      General: No scleral icterus.        Right eye: No discharge.         Left eye: No discharge.      Conjunctiva/sclera: Conjunctivae normal.   Cardiovascular:      Rate and Rhythm: Normal rate.   Pulmonary:      Effort: No respiratory distress.   Skin:     General: Skin is warm and dry.   Neurological:      Mental Status: He is alert and oriented to person, place, and time.   Psychiatric:         Behavior: Behavior normal.               Diagnoses and health risks identified today and associated recommendations/orders:  Encounter for preventive health examination    Chronic bilateral low back pain, unspecified whether sciatica present  -     Ambulatory referral/consult to [facility]; Future; Expected date: 12/10/2020    Aortic " atherosclerosis  Comments:  Stable. Will continue to monitor.         Provided Ad with a 5-10 year written screening schedule and personal prevention plan. Recommendations were developed using the USPSTF age appropriate recommendations. Education, counseling, and referrals were provided as needed. After Visit Summary printed and given to patient which includes a list of additional screenings\tests needed. Patient declines flu vaccine. Declines Tetanus, believes he had one about 5 years ago at the pharmacy. Declines shingles vaccine.     Follow up with Dr. Carbajal as scheduled.   Madelyn Whitney, APRN    I offered to discuss end of life issues, including information on how to make advance directives that the patient could use to name someone who would make medical decisions on their behalf if they became too ill to make themselves.    _X_Patient declined   ___Patient is interested, I provided paper work and offered to discuss.

## 2020-12-03 NOTE — PATIENT INSTRUCTIONS
If you are asked to answer a survey from [facility], please do so and let them know how I did at your visit today.   Counseling and Referral of Other Preventative  (Italic type indicates deductible and co-insurance are waived)    Patient Name: Ad Lyons  Today's Date: 12/3/2020    Health Maintenance       Date Due Completion Date    TETANUS VACCINE 01/15/1972 ---    High Dose Statin 01/15/1975 ---    Shingles Vaccine (1 of 2) 01/15/2004 ---    Influenza Vaccine (1) 08/01/2020 11/19/2019    Colorectal Cancer Screening 12/27/2022 12/27/2017    Override on 1/11/2013: Done (Dr Hughes--sent to scanning 03/28/2016)    Lipid Panel 05/18/2025 5/18/2020        No orders of the defined types were placed in this encounter.    The following information is provided to all patients.  This information is to help you find resources for any of the problems found today that may be affecting your health:                Living healthy guide: www.[facility].[place].gov      Understanding Diabetes: www.diabetes.org      Eating healthy: www.cdc.gov/healthyweight      [facility] home safety checklist: www.cdc.gov/steadi/patient.html      Agency on Aging: www.goea.[place].gov      Alcoholics anonymous (AA): www.aa.org      Physical Activity: www.keyur.nih.gov/gu1byny      Tobacco use: www.quitwithusla.org

## 2020-12-03 NOTE — Clinical Note
I saw your patient for their annual wellness visit. I found the following issues:needs flu, shingles, tdap and statin, refused all.  I have referred him back to you for his regular care. I hope this is helpful to you. NATE Quezada, FNP

## 2020-12-04 ENCOUNTER — PATIENT OUTREACH (OUTPATIENT)
Dept: ADMINISTRATIVE | Facility: OTHER | Age: 65
End: 2020-12-04

## 2020-12-04 ENCOUNTER — OFFICE VISIT (OUTPATIENT)
Dept: SPINE | Facility: CLINIC | Age: 65
End: 2020-12-04
Payer: MEDICARE

## 2020-12-04 VITALS — WEIGHT: 222 LBS | HEIGHT: 74 IN | BODY MASS INDEX: 28.49 KG/M2

## 2020-12-04 DIAGNOSIS — G89.29 CHRONIC BILATERAL LOW BACK PAIN, UNSPECIFIED WHETHER SCIATICA PRESENT: ICD-10-CM

## 2020-12-04 DIAGNOSIS — M54.50 CHRONIC BILATERAL LOW BACK PAIN, UNSPECIFIED WHETHER SCIATICA PRESENT: ICD-10-CM

## 2020-12-04 DIAGNOSIS — M51.36 LUMBAR DEGENERATIVE DISC DISEASE: Primary | ICD-10-CM

## 2020-12-04 PROCEDURE — 1125F AMNT PAIN NOTED PAIN PRSNT: CPT | Mod: S$GLB,,, | Performed by: PHYSICAL MEDICINE & REHABILITATION

## 2020-12-04 PROCEDURE — 1125F PR PAIN SEVERITY QUANTIFIED, PAIN PRESENT: ICD-10-PCS | Mod: S$GLB,,, | Performed by: PHYSICAL MEDICINE & REHABILITATION

## 2020-12-04 PROCEDURE — 1159F MED LIST DOCD IN RCRD: CPT | Mod: S$GLB,,, | Performed by: PHYSICAL MEDICINE & REHABILITATION

## 2020-12-04 PROCEDURE — 1159F PR MEDICATION LIST DOCUMENTED IN MEDICAL RECORD: ICD-10-PCS | Mod: S$GLB,,, | Performed by: PHYSICAL MEDICINE & REHABILITATION

## 2020-12-04 PROCEDURE — 1101F PT FALLS ASSESS-DOCD LE1/YR: CPT | Mod: S$GLB,,, | Performed by: PHYSICAL MEDICINE & REHABILITATION

## 2020-12-04 PROCEDURE — 3008F BODY MASS INDEX DOCD: CPT | Mod: S$GLB,,, | Performed by: PHYSICAL MEDICINE & REHABILITATION

## 2020-12-04 PROCEDURE — 99204 OFFICE O/P NEW MOD 45 MIN: CPT | Mod: S$GLB,,, | Performed by: PHYSICAL MEDICINE & REHABILITATION

## 2020-12-04 PROCEDURE — 99204 PR OFFICE/OUTPT VISIT, NEW, LEVL IV, 45-59 MIN: ICD-10-PCS | Mod: S$GLB,,, | Performed by: PHYSICAL MEDICINE & REHABILITATION

## 2020-12-04 PROCEDURE — 3288F PR FALLS RISK ASSESSMENT DOCUMENTED: ICD-10-PCS | Mod: S$GLB,,, | Performed by: PHYSICAL MEDICINE & REHABILITATION

## 2020-12-04 PROCEDURE — 3008F PR BODY MASS INDEX (BMI) DOCUMENTED: ICD-10-PCS | Mod: S$GLB,,, | Performed by: PHYSICAL MEDICINE & REHABILITATION

## 2020-12-04 PROCEDURE — 3288F FALL RISK ASSESSMENT DOCD: CPT | Mod: S$GLB,,, | Performed by: PHYSICAL MEDICINE & REHABILITATION

## 2020-12-04 PROCEDURE — 1101F PR PT FALLS ASSESS DOC 0-1 FALLS W/OUT INJ PAST YR: ICD-10-PCS | Mod: S$GLB,,, | Performed by: PHYSICAL MEDICINE & REHABILITATION

## 2020-12-04 NOTE — LETTER
December 4, 2020      Madelyn Whitney, APRN  [address] [address] [address]  [facility] [address]             [facility]  [address] [place] [address] [address]  [facility] [address]  Phone: [phone]  Fax: [phone]          Patient: Ad Lyons   MR Number: [id number]   YOB: 1955   Date of Visit: 12/4/2020       Dear Madelyn Whitney:    Thank you for referring Ad Lyons to me for evaluation. Attached you will find relevant portions of my assessment and plan of care.    If you have questions, please do not hesitate to call me. I look forward to following Ad Lyons along with you.    Sincerely,    Michael Ortega MD    Enclosure  CC:  No Recipients    If you would like to receive this communication electronically, please contact [email or web address] or [phone] to request more information on [employer] access.    For providers and/or their staff who would like to refer a patient to [facility], please contact us through our one-stop-shop provider referral line, [facility], at [phone].    If you feel you have received this communication in error or would no longer like to receive these types of communications, please e-mail [email or web address]

## 2020-12-04 NOTE — PROGRESS NOTES
Chart was reviewed for overdue Proactive [facility] Encounters ([facility])  topics  Updates were requested from care everywhere  Health Maintenance was unable to be updated  LINKS immunization registry triggered

## 2020-12-04 NOTE — PROGRESS NOTES
SUBJECTIVE:    Patient ID: Ad Lyons is a 65 y.o. male.    Chief Complaint: Back Pain    This is a 65-year-old man who sees Dr. Carbajal for his primary care.  He denies any chronic major medical problems.  No history of cancer.  Presents with approximately 6 month history left-sided low back pain at the lumbosacral junction.  Denies any radicular leg pain or weakness.  No bowel or bladder dysfunction fever chills sweats or unexpected weight loss.  He did physical therapy about 6 months ago and home exercise program with improvement in his pain.  Current complaint is left-sided low back pain at the lumbosacral junction which is worse with activity.  He is requesting a back brace to use with activity.  Current pain level is 2/10.  I reviewed a CT of the abdomen from January of this year which shows mild degenerative changes in lumbar spine            Past Medical History:   Diagnosis Date    Mills's esophagus     DDD (degenerative disc disease), cervical     Diarrhea 2019    Ear ringing     Gallbladder disease     Gastric ulcer with perforation     GERD (gastroesophageal reflux disease)     Hyperlipidemia     Intractable vomiting with nausea 2019    Kidney stone     Meniere disease     Dr TAYLA Mohan    Vertigo      Social History     Socioeconomic History    Marital status:      Spouse name: Isatu    Number of children: 4    Years of education: 17    Highest education level: Bachelor's degree (e.g., BA, AB, BS)   Occupational History     Employer: [employer]   Social Needs    Financial resource strain: Not hard at all    Food insecurity     Worry: Never true     Inability: Never true    Transportation needs     Medical: No     Non-medical: No   Tobacco Use    Smoking status: Former Smoker     Packs/day: 0.05     Years: 6.00     Pack years: 0.30     Start date:      Quit date:      Years since quittin.9    Smokeless tobacco: Never Used    Substance and Sexual Activity    Alcohol use: No     Frequency: Never    Drug use: Never    Sexual activity: Yes     Partners: Female   Lifestyle    Physical activity     Days per week: 1 day     Minutes per session: 30 min    Stress: Only a little   Relationships    Social connections     Talks on phone: Once a week     Gets together: Twice a week     Attends Druze service: Not on file     Active member of club or organization: Yes     Attends meetings of clubs or organizations: More than 4 times per year     Relationship status:    Other Topics Concern    Not on file   Social History Narrative    Not on file     Past Surgical History:   Procedure Laterality Date    ADENOIDECTOMY      CHOLECYSTECTOMY      COLONOSCOPY N/A 12/27/2017    Procedure: COLONOSCOPY;  Surgeon: Adrian Castillo MD;  Location: [facility] ENDO;  Service: Endoscopy;  Laterality: N/A;    COLONOSCOPY W/ POLYPECTOMY  1/2013    Dr Hughes    ERCP  12/26/2016    [facility] Epic note    ERCP N/A 1/9/2020    Procedure: ERCP (ENDOSCOPIC RETROGRADE CHOLANGIOPANCREATOGRAPHY);  Surgeon: Adrian Castillo MD;  Location: [facility] ENDO;  Service: Endoscopy;  Laterality: N/A;    ERCP N/A 2/17/2020    Procedure: ERCP (ENDOSCOPIC RETROGRADE CHOLANGIOPANCREATOGRAPHY);  Surgeon: Adrian Castillo MD;  Location: [facility] ENDO;  Service: Endoscopy;  Laterality: N/A;    ESOPHAGOGASTRODUODENOSCOPY  1/2013    Dr Hughes    ESOPHAGOGASTRODUODENOSCOPY N/A 2/17/2020    Procedure: EGD (ESOPHAGOGASTRODUODENOSCOPY);  Surgeon: Adrian Castillo MD;  Location: [facility] ENDO;  Service: Endoscopy;  Laterality: N/A;    TONSILLECTOMY      VASECTOMY       Family History   Problem Relation Age of Onset    Heart disease Father     Heart disease Mother     Hyperlipidemia Mother     No Known Problems Sister     Cancer Brother         possible colon cancer    No Known Problems Daughter     No Known Problems Son     Cancer Sister         breast cancer    No Known  "Problems Daughter     No Known Problems Son      Vitals:    12/04/20 0929   Weight: 100.7 kg (222 lb 0.1 oz)   Height: 6' 2" (1.88 m)       Review of Systems   Constitutional: Negative for chills, diaphoresis, fatigue, fever and unexpected weight change.   HENT: Negative for trouble swallowing.    Eyes: Negative for visual disturbance.   Respiratory: Negative for shortness of breath.    Cardiovascular: Negative for chest pain.   Gastrointestinal: Negative for abdominal pain, constipation, diarrhea, nausea and vomiting.   Genitourinary: Negative for difficulty urinating.   Musculoskeletal: Negative for arthralgias, back pain, gait problem, joint swelling, myalgias, neck pain and neck stiffness.   Neurological: Negative for dizziness, speech difficulty, weakness, light-headedness, numbness and headaches.          Objective:      Physical Exam  Neurological:      Mental Status: He is alert and oriented to person, place, and time.      Comments: He is awake and in no acute distress  No point tenderness or palpable masses about the lumbar spine  Forward flexion is to about 60° before complains of pain at the lumbosacral junction.  Extension causes mild pain on the left at the lumbosacral junction  He can heel and toe walk normally  Deep tendon reflexes are trace at both knees and both ankles  Strength is normal in both lower extremities  Straight leg raise negative bilaterally  CASPER test negative bilaterally             Assessment:       1. Lumbar degenerative disc disease    2. Chronic bilateral low back pain, unspecified whether sciatica present           Plan:     he has a nonfocal examination from a neurological standpoint and no historical red flags.  Mild degenerative changes on CT.  I will order a brace for him to use with activity.  Follow-up in 6 weeks.      Lumbar degenerative disc disease  -     Back/Cervical Brace For Home Use    Chronic bilateral low back pain, unspecified whether sciatica present  -     " Ambulatory referral[facility]

## 2021-02-02 ENCOUNTER — OFFICE VISIT (OUTPATIENT)
Dept: FAMILY MEDICINE | Facility: CLINIC | Age: 66
End: 2021-02-02
Attending: FAMILY MEDICINE
Payer: MEDICARE

## 2021-02-02 VITALS
WEIGHT: 223.13 LBS | BODY MASS INDEX: 28.64 KG/M2 | DIASTOLIC BLOOD PRESSURE: 70 MMHG | SYSTOLIC BLOOD PRESSURE: 120 MMHG | HEART RATE: 71 BPM | OXYGEN SATURATION: 96 % | TEMPERATURE: 98 F | HEIGHT: 74 IN

## 2021-02-02 DIAGNOSIS — I10 ESSENTIAL HYPERTENSION: ICD-10-CM

## 2021-02-02 DIAGNOSIS — K21.9 GASTROESOPHAGEAL REFLUX DISEASE WITHOUT ESOPHAGITIS: ICD-10-CM

## 2021-02-02 DIAGNOSIS — K83.09 CHOLANGITIS: ICD-10-CM

## 2021-02-02 DIAGNOSIS — K22.70 BARRETT'S ESOPHAGUS WITHOUT DYSPLASIA: ICD-10-CM

## 2021-02-02 DIAGNOSIS — M53.86 DECREASED RANGE OF MOTION OF INTERVERTEBRAL DISCS OF LUMBAR SPINE: Primary | ICD-10-CM

## 2021-02-02 DIAGNOSIS — E78.2 MIXED HYPERLIPIDEMIA: ICD-10-CM

## 2021-02-02 PROCEDURE — 3078F DIAST BP <80 MM HG: CPT | Mod: S$GLB,,, | Performed by: FAMILY MEDICINE

## 2021-02-02 PROCEDURE — 1101F PR PT FALLS ASSESS DOC 0-1 FALLS W/OUT INJ PAST YR: ICD-10-PCS | Mod: S$GLB,,, | Performed by: FAMILY MEDICINE

## 2021-02-02 PROCEDURE — 1101F PT FALLS ASSESS-DOCD LE1/YR: CPT | Mod: S$GLB,,, | Performed by: FAMILY MEDICINE

## 2021-02-02 PROCEDURE — 1159F PR MEDICATION LIST DOCUMENTED IN MEDICAL RECORD: ICD-10-PCS | Mod: S$GLB,,, | Performed by: FAMILY MEDICINE

## 2021-02-02 PROCEDURE — 3074F SYST BP LT 130 MM HG: CPT | Mod: S$GLB,,, | Performed by: FAMILY MEDICINE

## 2021-02-02 PROCEDURE — 3288F FALL RISK ASSESSMENT DOCD: CPT | Mod: S$GLB,,, | Performed by: FAMILY MEDICINE

## 2021-02-02 PROCEDURE — 99213 OFFICE O/P EST LOW 20 MIN: CPT | Mod: S$GLB,,, | Performed by: FAMILY MEDICINE

## 2021-02-02 PROCEDURE — 3288F PR FALLS RISK ASSESSMENT DOCUMENTED: ICD-10-PCS | Mod: S$GLB,,, | Performed by: FAMILY MEDICINE

## 2021-02-02 PROCEDURE — 3008F PR BODY MASS INDEX (BMI) DOCUMENTED: ICD-10-PCS | Mod: S$GLB,,, | Performed by: FAMILY MEDICINE

## 2021-02-02 PROCEDURE — 1159F MED LIST DOCD IN RCRD: CPT | Mod: S$GLB,,, | Performed by: FAMILY MEDICINE

## 2021-02-02 PROCEDURE — 99999 PR PBB SHADOW E&M-EST. PATIENT-LVL IV: CPT | Mod: PBBFAC,,, | Performed by: FAMILY MEDICINE

## 2021-02-02 PROCEDURE — 3008F BODY MASS INDEX DOCD: CPT | Mod: S$GLB,,, | Performed by: FAMILY MEDICINE

## 2021-02-02 PROCEDURE — 99213 PR OFFICE/OUTPT VISIT, EST, LEVL III, 20-29 MIN: ICD-10-PCS | Mod: S$GLB,,, | Performed by: FAMILY MEDICINE

## 2021-02-02 PROCEDURE — 3074F PR MOST RECENT SYSTOLIC BLOOD PRESSURE < 130 MM HG: ICD-10-PCS | Mod: S$GLB,,, | Performed by: FAMILY MEDICINE

## 2021-02-02 PROCEDURE — 3078F PR MOST RECENT DIASTOLIC BLOOD PRESSURE < 80 MM HG: ICD-10-PCS | Mod: S$GLB,,, | Performed by: FAMILY MEDICINE

## 2021-02-02 PROCEDURE — 99999 PR PBB SHADOW E&M-EST. PATIENT-LVL IV: ICD-10-PCS | Mod: PBBFAC,,, | Performed by: FAMILY MEDICINE

## 2021-04-19 DIAGNOSIS — K21.9 GASTROESOPHAGEAL REFLUX DISEASE: ICD-10-CM

## 2021-04-23 DIAGNOSIS — K21.9 GASTROESOPHAGEAL REFLUX DISEASE: ICD-10-CM

## 2021-04-23 DIAGNOSIS — R42 VERTIGO: ICD-10-CM

## 2021-04-23 RX ORDER — OMEPRAZOLE 40 MG/1
40 CAPSULE, DELAYED RELEASE ORAL
Qty: 180 CAPSULE | Refills: 3 | Status: SHIPPED | OUTPATIENT
Start: 2021-04-23

## 2021-04-23 RX ORDER — OMEPRAZOLE 40 MG/1
CAPSULE, DELAYED RELEASE ORAL
Qty: 180 CAPSULE | Refills: 3 | OUTPATIENT
Start: 2021-04-23

## 2021-04-25 DIAGNOSIS — R42 VERTIGO: ICD-10-CM

## 2021-04-26 ENCOUNTER — TELEPHONE (OUTPATIENT)
Dept: FAMILY MEDICINE | Facility: CLINIC | Age: 66
End: 2021-04-26

## 2021-04-26 RX ORDER — MECLIZINE HYDROCHLORIDE 25 MG/1
25 TABLET ORAL 3 TIMES DAILY PRN
Qty: 60 TABLET | Refills: 2 | OUTPATIENT
Start: 2021-04-26

## 2021-04-26 RX ORDER — CHLORHEXIDINE GLUCONATE ORAL RINSE 1.2 MG/ML
SOLUTION DENTAL
Qty: 473 ML | Refills: 3 | OUTPATIENT
Start: 2021-04-26

## 2021-04-26 RX ORDER — MECLIZINE HYDROCHLORIDE 25 MG/1
25 TABLET ORAL 3 TIMES DAILY PRN
Qty: 270 TABLET | Refills: 2 | Status: SHIPPED | OUTPATIENT
Start: 2021-04-26

## 2021-04-26 RX ORDER — CHLORHEXIDINE GLUCONATE ORAL RINSE 1.2 MG/ML
SOLUTION DENTAL
Qty: 473 ML | Refills: 3 | Status: SHIPPED | OUTPATIENT
Start: 2021-04-26

## 2021-05-07 ENCOUNTER — LAB VISIT (OUTPATIENT)
Dept: LAB | Facility: HOSPITAL | Age: 66
End: 2021-05-07
Attending: FAMILY MEDICINE
Payer: MEDICARE

## 2021-05-07 DIAGNOSIS — K22.70 BARRETT'S ESOPHAGUS WITHOUT DYSPLASIA: ICD-10-CM

## 2021-05-07 DIAGNOSIS — K83.09 CHOLANGITIS: ICD-10-CM

## 2021-05-07 DIAGNOSIS — E78.2 MIXED HYPERLIPIDEMIA: ICD-10-CM

## 2021-05-07 DIAGNOSIS — K21.9 GASTROESOPHAGEAL REFLUX DISEASE WITHOUT ESOPHAGITIS: ICD-10-CM

## 2021-05-07 DIAGNOSIS — I10 ESSENTIAL HYPERTENSION: ICD-10-CM

## 2021-05-07 LAB
ALBUMIN SERPL BCP-MCNC: 3.9 G/DL (ref 3.5–5.2)
ALP SERPL-CCNC: 61 U/L (ref 55–135)
ALT SERPL W/O P-5'-P-CCNC: 22 U/L (ref 10–44)
ANION GAP SERPL CALC-SCNC: 7 MMOL/L (ref 8–16)
AST SERPL-CCNC: 19 U/L (ref 10–40)
BASOPHILS # BLD AUTO: 0.03 K/UL (ref 0–0.2)
BASOPHILS NFR BLD: 0.5 % (ref 0–1.9)
BILIRUB SERPL-MCNC: 0.6 MG/DL (ref 0.1–1)
BUN SERPL-MCNC: 13 MG/DL (ref 8–23)
CALCIUM SERPL-MCNC: 9.2 MG/DL (ref 8.7–10.5)
CHLORIDE SERPL-SCNC: 105 MMOL/L (ref 95–110)
CHOLEST SERPL-MCNC: 194 MG/DL (ref 120–199)
CHOLEST/HDLC SERPL: 4.4 {RATIO} (ref 2–5)
CO2 SERPL-SCNC: 26 MMOL/L (ref 23–29)
CREAT SERPL-MCNC: 0.9 MG/DL (ref 0.5–1.4)
DIFFERENTIAL METHOD: NORMAL
EOSINOPHIL # BLD AUTO: 0.5 K/UL (ref 0–0.5)
EOSINOPHIL NFR BLD: 7.4 % (ref 0–8)
ERYTHROCYTE [DISTWIDTH] IN BLOOD BY AUTOMATED COUNT: 13.4 % (ref 11.5–14.5)
EST. GFR  (AFRICAN AMERICAN): >60 ML/MIN/1.73 M^2
EST. GFR  (NON AFRICAN AMERICAN): >60 ML/MIN/1.73 M^2
GLUCOSE SERPL-MCNC: 91 MG/DL (ref 70–110)
HCT VFR BLD AUTO: 44.2 % (ref 40–54)
HDLC SERPL-MCNC: 44 MG/DL (ref 40–75)
HDLC SERPL: 22.7 % (ref 20–50)
HGB BLD-MCNC: 14.9 G/DL (ref 14–18)
IMM GRANULOCYTES # BLD AUTO: 0.02 K/UL (ref 0–0.04)
IMM GRANULOCYTES NFR BLD AUTO: 0.3 % (ref 0–0.5)
LDLC SERPL CALC-MCNC: 116.4 MG/DL (ref 63–159)
LYMPHOCYTES # BLD AUTO: 2.1 K/UL (ref 1–4.8)
LYMPHOCYTES NFR BLD: 33.4 % (ref 18–48)
MCH RBC QN AUTO: 30 PG (ref 27–31)
MCHC RBC AUTO-ENTMCNC: 33.7 G/DL (ref 32–36)
MCV RBC AUTO: 89 FL (ref 82–98)
MONOCYTES # BLD AUTO: 0.4 K/UL (ref 0.3–1)
MONOCYTES NFR BLD: 5.7 % (ref 4–15)
NEUTROPHILS # BLD AUTO: 3.3 K/UL (ref 1.8–7.7)
NEUTROPHILS NFR BLD: 52.7 % (ref 38–73)
NONHDLC SERPL-MCNC: 150 MG/DL
NRBC BLD-RTO: 0 /100 WBC
PLATELET # BLD AUTO: 234 K/UL (ref 150–450)
PMV BLD AUTO: 10.3 FL (ref 9.2–12.9)
POTASSIUM SERPL-SCNC: 4.3 MMOL/L (ref 3.5–5.1)
PROT SERPL-MCNC: 7.1 G/DL (ref 6–8.4)
RBC # BLD AUTO: 4.96 M/UL (ref 4.6–6.2)
SODIUM SERPL-SCNC: 138 MMOL/L (ref 136–145)
TRIGL SERPL-MCNC: 168 MG/DL (ref 30–150)
WBC # BLD AUTO: 6.31 K/UL (ref 3.9–12.7)

## 2021-05-07 PROCEDURE — 80053 COMPREHEN METABOLIC PANEL: CPT | Performed by: FAMILY MEDICINE

## 2021-05-07 PROCEDURE — 85025 COMPLETE CBC W/AUTO DIFF WBC: CPT | Performed by: FAMILY MEDICINE

## 2021-05-07 PROCEDURE — 36415 COLL VENOUS BLD VENIPUNCTURE: CPT | Mod: PO | Performed by: FAMILY MEDICINE

## 2021-05-07 PROCEDURE — 80061 LIPID PANEL: CPT | Performed by: FAMILY MEDICINE

## 2021-05-12 ENCOUNTER — TELEPHONE (OUTPATIENT)
Dept: FAMILY MEDICINE | Facility: CLINIC | Age: 66
End: 2021-05-12

## 2021-05-13 ENCOUNTER — OFFICE VISIT (OUTPATIENT)
Dept: FAMILY MEDICINE | Facility: CLINIC | Age: 66
End: 2021-05-13
Attending: FAMILY MEDICINE
Payer: MEDICARE

## 2021-05-13 ENCOUNTER — LAB VISIT (OUTPATIENT)
Dept: LAB | Facility: HOSPITAL | Age: 66
End: 2021-05-13
Attending: FAMILY MEDICINE
Payer: MEDICARE

## 2021-05-13 VITALS
HEART RATE: 73 BPM | OXYGEN SATURATION: 93 % | BODY MASS INDEX: 29.55 KG/M2 | RESPIRATION RATE: 16 BRPM | SYSTOLIC BLOOD PRESSURE: 122 MMHG | WEIGHT: 230.19 LBS | DIASTOLIC BLOOD PRESSURE: 70 MMHG | TEMPERATURE: 98 F

## 2021-05-13 DIAGNOSIS — H81.02 ACTIVE COCHLEOVESTIBULAR MENIERE'S DISEASE OF LEFT EAR: ICD-10-CM

## 2021-05-13 DIAGNOSIS — H90.3 SENSORINEURAL HEARING LOSS (SNHL) OF BOTH EARS: ICD-10-CM

## 2021-05-13 DIAGNOSIS — K22.70 BARRETT'S ESOPHAGUS WITHOUT DYSPLASIA: ICD-10-CM

## 2021-05-13 DIAGNOSIS — E78.2 MIXED HYPERLIPIDEMIA: ICD-10-CM

## 2021-05-13 DIAGNOSIS — K90.89 BILE SALT-INDUCED DIARRHEA: ICD-10-CM

## 2021-05-13 DIAGNOSIS — K83.09 CHOLANGITIS: ICD-10-CM

## 2021-05-13 DIAGNOSIS — I10 ESSENTIAL HYPERTENSION: ICD-10-CM

## 2021-05-13 DIAGNOSIS — Z00.00 ENCOUNTER FOR PREVENTIVE HEALTH EXAMINATION: Primary | ICD-10-CM

## 2021-05-13 DIAGNOSIS — K21.9 GASTROESOPHAGEAL REFLUX DISEASE WITHOUT ESOPHAGITIS: ICD-10-CM

## 2021-05-13 DIAGNOSIS — Z12.5 PROSTATE CANCER SCREENING: ICD-10-CM

## 2021-05-13 PROCEDURE — 99999 PR PBB SHADOW E&M-EST. PATIENT-LVL IV: ICD-10-PCS | Mod: PBBFAC,,, | Performed by: FAMILY MEDICINE

## 2021-05-13 PROCEDURE — 1101F PR PT FALLS ASSESS DOC 0-1 FALLS W/OUT INJ PAST YR: ICD-10-PCS | Mod: S$GLB,,, | Performed by: FAMILY MEDICINE

## 2021-05-13 PROCEDURE — 99214 OFFICE O/P EST MOD 30 MIN: CPT | Mod: S$GLB,,, | Performed by: FAMILY MEDICINE

## 2021-05-13 PROCEDURE — 36415 COLL VENOUS BLD VENIPUNCTURE: CPT | Mod: PO | Performed by: FAMILY MEDICINE

## 2021-05-13 PROCEDURE — 84153 ASSAY OF PSA TOTAL: CPT | Performed by: FAMILY MEDICINE

## 2021-05-13 PROCEDURE — 99999 PR PBB SHADOW E&M-EST. PATIENT-LVL IV: CPT | Mod: PBBFAC,,, | Performed by: FAMILY MEDICINE

## 2021-05-13 PROCEDURE — 3288F PR FALLS RISK ASSESSMENT DOCUMENTED: ICD-10-PCS | Mod: S$GLB,,, | Performed by: FAMILY MEDICINE

## 2021-05-13 PROCEDURE — 1101F PT FALLS ASSESS-DOCD LE1/YR: CPT | Mod: S$GLB,,, | Performed by: FAMILY MEDICINE

## 2021-05-13 PROCEDURE — 99214 PR OFFICE/OUTPT VISIT, EST, LEVL IV, 30-39 MIN: ICD-10-PCS | Mod: S$GLB,,, | Performed by: FAMILY MEDICINE

## 2021-05-13 PROCEDURE — 1125F PR PAIN SEVERITY QUANTIFIED, PAIN PRESENT: ICD-10-PCS | Mod: S$GLB,,, | Performed by: FAMILY MEDICINE

## 2021-05-13 PROCEDURE — 1125F AMNT PAIN NOTED PAIN PRSNT: CPT | Mod: S$GLB,,, | Performed by: FAMILY MEDICINE

## 2021-05-13 PROCEDURE — 3288F FALL RISK ASSESSMENT DOCD: CPT | Mod: S$GLB,,, | Performed by: FAMILY MEDICINE

## 2021-05-13 PROCEDURE — 3008F PR BODY MASS INDEX (BMI) DOCUMENTED: ICD-10-PCS | Mod: S$GLB,,, | Performed by: FAMILY MEDICINE

## 2021-05-13 PROCEDURE — 3008F BODY MASS INDEX DOCD: CPT | Mod: S$GLB,,, | Performed by: FAMILY MEDICINE

## 2021-05-13 RX ORDER — CHOLESTYRAMINE 4 G/9G
POWDER, FOR SUSPENSION ORAL
Qty: 1134 G | Refills: 4 | Status: SHIPPED | OUTPATIENT
Start: 2021-05-13 | End: 2021-06-25 | Stop reason: SDUPTHER

## 2021-05-13 RX ORDER — CHOLESTYRAMINE 4 G/9G
POWDER, FOR SUSPENSION ORAL
Qty: 1134 G | Refills: 4 | Status: CANCELLED | OUTPATIENT
Start: 2021-05-13

## 2021-05-14 LAB — COMPLEXED PSA SERPL-MCNC: 2.2 NG/ML (ref 0–4)

## 2021-06-25 DIAGNOSIS — K90.89 BILE SALT-INDUCED DIARRHEA: ICD-10-CM

## 2021-06-25 RX ORDER — CHOLESTYRAMINE 4 G/9G
POWDER, FOR SUSPENSION ORAL
Qty: 1134 G | Refills: 4 | Status: CANCELLED | OUTPATIENT
Start: 2021-06-25

## 2021-06-28 RX ORDER — CHOLESTYRAMINE 4 G/9G
POWDER, FOR SUSPENSION ORAL
Qty: 756 G | Refills: 4 | Status: SHIPPED | OUTPATIENT
Start: 2021-06-28

## 2021-07-15 ENCOUNTER — OFFICE VISIT (OUTPATIENT)
Dept: URGENT CARE | Facility: CLINIC | Age: 66
End: 2021-07-15
Payer: MEDICARE

## 2021-07-15 VITALS
TEMPERATURE: 98 F | HEART RATE: 65 BPM | HEIGHT: 74 IN | BODY MASS INDEX: 30.26 KG/M2 | DIASTOLIC BLOOD PRESSURE: 67 MMHG | RESPIRATION RATE: 16 BRPM | SYSTOLIC BLOOD PRESSURE: 110 MMHG | OXYGEN SATURATION: 97 % | WEIGHT: 235.81 LBS

## 2021-07-15 DIAGNOSIS — B35.3 TINEA PEDIS OF RIGHT FOOT: Primary | ICD-10-CM

## 2021-07-15 PROCEDURE — 3008F PR BODY MASS INDEX (BMI) DOCUMENTED: ICD-10-PCS | Mod: S$GLB,,, | Performed by: NURSE PRACTITIONER

## 2021-07-15 PROCEDURE — 99213 PR OFFICE/OUTPT VISIT, EST, LEVL III, 20-29 MIN: ICD-10-PCS | Mod: S$GLB,,, | Performed by: NURSE PRACTITIONER

## 2021-07-15 PROCEDURE — 3008F BODY MASS INDEX DOCD: CPT | Mod: S$GLB,,, | Performed by: NURSE PRACTITIONER

## 2021-07-15 PROCEDURE — 99213 OFFICE O/P EST LOW 20 MIN: CPT | Mod: S$GLB,,, | Performed by: NURSE PRACTITIONER

## 2021-07-15 RX ORDER — KETOCONAZOLE 20 MG/G
CREAM TOPICAL DAILY
Qty: 60 G | Refills: 0 | Status: SHIPPED | OUTPATIENT
Start: 2021-07-15

## 2021-07-16 ENCOUNTER — TELEPHONE (OUTPATIENT)
Dept: FAMILY MEDICINE | Facility: CLINIC | Age: 66
End: 2021-07-16

## 2021-08-02 ENCOUNTER — PATIENT MESSAGE (OUTPATIENT)
Dept: FAMILY MEDICINE | Facility: CLINIC | Age: 66
End: 2021-08-02

## 2021-08-02 ENCOUNTER — TELEPHONE (OUTPATIENT)
Dept: FAMILY MEDICINE | Facility: CLINIC | Age: 66
End: 2021-08-02

## 2021-08-02 ENCOUNTER — OFFICE VISIT (OUTPATIENT)
Dept: URGENT CARE | Facility: CLINIC | Age: 66
End: 2021-08-02
Payer: MEDICARE

## 2021-08-02 VITALS
RESPIRATION RATE: 16 BRPM | SYSTOLIC BLOOD PRESSURE: 116 MMHG | HEART RATE: 70 BPM | HEIGHT: 74 IN | BODY MASS INDEX: 29.52 KG/M2 | OXYGEN SATURATION: 96 % | TEMPERATURE: 98 F | DIASTOLIC BLOOD PRESSURE: 76 MMHG | WEIGHT: 230 LBS

## 2021-08-02 DIAGNOSIS — U07.1 COVID-19 VIRUS INFECTION: ICD-10-CM

## 2021-08-02 DIAGNOSIS — U07.1 COVID-19 VIRUS DETECTED: ICD-10-CM

## 2021-08-02 DIAGNOSIS — B34.9 ACUTE VIRAL SYNDROME: Primary | ICD-10-CM

## 2021-08-02 PROCEDURE — 99214 OFFICE O/P EST MOD 30 MIN: CPT | Mod: S$GLB,,, | Performed by: EMERGENCY MEDICINE

## 2021-08-02 PROCEDURE — 3078F DIAST BP <80 MM HG: CPT | Mod: S$GLB,,, | Performed by: EMERGENCY MEDICINE

## 2021-08-02 PROCEDURE — 3008F BODY MASS INDEX DOCD: CPT | Mod: S$GLB,,, | Performed by: EMERGENCY MEDICINE

## 2021-08-02 PROCEDURE — 1159F MED LIST DOCD IN RCRD: CPT | Mod: S$GLB,,, | Performed by: EMERGENCY MEDICINE

## 2021-08-02 PROCEDURE — 3008F PR BODY MASS INDEX (BMI) DOCUMENTED: ICD-10-PCS | Mod: S$GLB,,, | Performed by: EMERGENCY MEDICINE

## 2021-08-02 PROCEDURE — 1159F PR MEDICATION LIST DOCUMENTED IN MEDICAL RECORD: ICD-10-PCS | Mod: S$GLB,,, | Performed by: EMERGENCY MEDICINE

## 2021-08-02 PROCEDURE — 3078F PR MOST RECENT DIASTOLIC BLOOD PRESSURE < 80 MM HG: ICD-10-PCS | Mod: S$GLB,,, | Performed by: EMERGENCY MEDICINE

## 2021-08-02 PROCEDURE — 99214 PR OFFICE/OUTPT VISIT, EST, LEVL IV, 30-39 MIN: ICD-10-PCS | Mod: S$GLB,,, | Performed by: EMERGENCY MEDICINE

## 2021-08-02 PROCEDURE — 1160F PR REVIEW ALL MEDS BY PRESCRIBER/CLIN PHARMACIST DOCUMENTED: ICD-10-PCS | Mod: S$GLB,,, | Performed by: EMERGENCY MEDICINE

## 2021-08-02 PROCEDURE — 1160F RVW MEDS BY RX/DR IN RCRD: CPT | Mod: S$GLB,,, | Performed by: EMERGENCY MEDICINE

## 2021-08-02 PROCEDURE — 3074F PR MOST RECENT SYSTOLIC BLOOD PRESSURE < 130 MM HG: ICD-10-PCS | Mod: S$GLB,,, | Performed by: EMERGENCY MEDICINE

## 2021-08-02 PROCEDURE — 3074F SYST BP LT 130 MM HG: CPT | Mod: S$GLB,,, | Performed by: EMERGENCY MEDICINE

## 2021-08-04 ENCOUNTER — INFUSION (OUTPATIENT)
Dept: INFECTIOUS DISEASES | Facility: HOSPITAL | Age: 66
End: 2021-08-04
Attending: EMERGENCY MEDICINE
Payer: MEDICARE

## 2021-08-04 VITALS
HEART RATE: 53 BPM | SYSTOLIC BLOOD PRESSURE: 128 MMHG | TEMPERATURE: 98 F | RESPIRATION RATE: 18 BRPM | DIASTOLIC BLOOD PRESSURE: 69 MMHG | OXYGEN SATURATION: 96 %

## 2021-08-04 DIAGNOSIS — U07.1 COVID-19: Primary | ICD-10-CM

## 2021-08-04 PROCEDURE — 63600175 PHARM REV CODE 636 W HCPCS: Performed by: EMERGENCY MEDICINE

## 2021-08-04 PROCEDURE — 25000003 PHARM REV CODE 250: Performed by: EMERGENCY MEDICINE

## 2021-08-04 PROCEDURE — M0243 CASIRIVI AND IMDEVI INFUSION: HCPCS | Performed by: EMERGENCY MEDICINE

## 2021-08-04 RX ORDER — ALBUTEROL SULFATE 90 UG/1
2 AEROSOL, METERED RESPIRATORY (INHALATION)
Status: DISPENSED | OUTPATIENT
Start: 2021-08-04

## 2021-08-04 RX ORDER — DIPHENHYDRAMINE HYDROCHLORIDE 50 MG/ML
25 INJECTION INTRAMUSCULAR; INTRAVENOUS ONCE AS NEEDED
Status: DISPENSED | OUTPATIENT
Start: 2021-08-04 | End: 2032-12-31

## 2021-08-04 RX ORDER — ACETAMINOPHEN 325 MG/1
650 TABLET ORAL ONCE AS NEEDED
Status: DISPENSED | OUTPATIENT
Start: 2021-08-04 | End: 2032-12-31

## 2021-08-04 RX ORDER — SODIUM CHLORIDE 0.9 % (FLUSH) 0.9 %
10 SYRINGE (ML) INJECTION
Status: ACTIVE | OUTPATIENT
Start: 2021-08-04

## 2021-08-04 RX ORDER — EPINEPHRINE 0.3 MG/.3ML
0.3 INJECTION SUBCUTANEOUS
Status: DISPENSED | OUTPATIENT
Start: 2021-08-04

## 2021-08-04 RX ORDER — ONDANSETRON 2 MG/ML
4 INJECTION INTRAMUSCULAR; INTRAVENOUS ONCE AS NEEDED
Status: DISPENSED | OUTPATIENT
Start: 2021-08-04 | End: 2032-12-31

## 2021-08-04 RX ADMIN — SODIUM CHLORIDE: 0.9 INJECTION, SOLUTION INTRAVENOUS at 11:08

## 2021-08-04 RX ADMIN — CASIRIVIMAB 600 MG: 1332 INJECTION, SOLUTION, CONCENTRATE INTRAVENOUS at 11:08

## 2021-09-07 ENCOUNTER — PATIENT OUTREACH (OUTPATIENT)
Dept: ADMINISTRATIVE | Facility: OTHER | Age: 66
End: 2021-09-07

## 2022-01-08 ENCOUNTER — OFFICE VISIT (OUTPATIENT)
Dept: URGENT CARE | Facility: CLINIC | Age: 67
End: 2022-01-08
Payer: MEDICARE

## 2022-01-08 VITALS
SYSTOLIC BLOOD PRESSURE: 121 MMHG | HEART RATE: 66 BPM | TEMPERATURE: 98 F | WEIGHT: 221 LBS | HEIGHT: 74 IN | BODY MASS INDEX: 28.36 KG/M2 | RESPIRATION RATE: 16 BRPM | OXYGEN SATURATION: 97 % | DIASTOLIC BLOOD PRESSURE: 75 MMHG

## 2022-01-08 DIAGNOSIS — R05.9 COUGH: Primary | ICD-10-CM

## 2022-01-08 LAB
CTP QC/QA: YES
SARS-COV-2 AG RESP QL IA.RAPID: NEGATIVE

## 2022-01-08 PROCEDURE — 1159F PR MEDICATION LIST DOCUMENTED IN MEDICAL RECORD: ICD-10-PCS | Mod: CPTII,S$GLB,, | Performed by: NURSE PRACTITIONER

## 2022-01-08 PROCEDURE — 99213 PR OFFICE/OUTPT VISIT, EST, LEVL III, 20-29 MIN: ICD-10-PCS | Mod: S$GLB,,, | Performed by: NURSE PRACTITIONER

## 2022-01-08 PROCEDURE — 1160F PR REVIEW ALL MEDS BY PRESCRIBER/CLIN PHARMACIST DOCUMENTED: ICD-10-PCS | Mod: CPTII,S$GLB,, | Performed by: NURSE PRACTITIONER

## 2022-01-08 PROCEDURE — 3074F PR MOST RECENT SYSTOLIC BLOOD PRESSURE < 130 MM HG: ICD-10-PCS | Mod: CPTII,S$GLB,, | Performed by: NURSE PRACTITIONER

## 2022-01-08 PROCEDURE — 1160F RVW MEDS BY RX/DR IN RCRD: CPT | Mod: CPTII,S$GLB,, | Performed by: NURSE PRACTITIONER

## 2022-01-08 PROCEDURE — 3008F PR BODY MASS INDEX (BMI) DOCUMENTED: ICD-10-PCS | Mod: CPTII,S$GLB,, | Performed by: NURSE PRACTITIONER

## 2022-01-08 PROCEDURE — 3078F PR MOST RECENT DIASTOLIC BLOOD PRESSURE < 80 MM HG: ICD-10-PCS | Mod: CPTII,S$GLB,, | Performed by: NURSE PRACTITIONER

## 2022-01-08 PROCEDURE — 87811 SARS-COV-2 COVID19 W/OPTIC: CPT | Mod: S$GLB,,, | Performed by: NURSE PRACTITIONER

## 2022-01-08 PROCEDURE — 3074F SYST BP LT 130 MM HG: CPT | Mod: CPTII,S$GLB,, | Performed by: NURSE PRACTITIONER

## 2022-01-08 PROCEDURE — 3008F BODY MASS INDEX DOCD: CPT | Mod: CPTII,S$GLB,, | Performed by: NURSE PRACTITIONER

## 2022-01-08 PROCEDURE — 3078F DIAST BP <80 MM HG: CPT | Mod: CPTII,S$GLB,, | Performed by: NURSE PRACTITIONER

## 2022-01-08 PROCEDURE — 1159F MED LIST DOCD IN RCRD: CPT | Mod: CPTII,S$GLB,, | Performed by: NURSE PRACTITIONER

## 2022-01-08 PROCEDURE — 99213 OFFICE O/P EST LOW 20 MIN: CPT | Mod: S$GLB,,, | Performed by: NURSE PRACTITIONER

## 2022-01-08 PROCEDURE — 87811 SARS CORONAVIRUS 2 ANTIGEN POCT, MANUAL READ: ICD-10-PCS | Mod: S$GLB,,, | Performed by: NURSE PRACTITIONER

## 2022-01-08 NOTE — PROGRESS NOTES
"Subjective:       Patient ID: Ad Lyons is a 67 y.o. male.    Vitals:  height is 6' 2" (1.88 m) and weight is 100.2 kg (221 lb). His temperature is 98.3 °F (36.8 °C). His blood pressure is 121/75 and his pulse is 66. His respiration is 16 and oxygen saturation is 97%.     Chief Complaint: Sinus Problem    Sick 1 week ago, almost resolved. Son in law tested positive today for COVID 19, last saw him Tuesday.    Past Medical History:  No date: Mills's esophagus  No date: DDD (degenerative disc disease), cervical  1/7/2019: Diarrhea  No date: Ear ringing  No date: Gallbladder disease  No date: Gastric ulcer with perforation  No date: GERD (gastroesophageal reflux disease)  No date: Hyperlipidemia  1/7/2019: Intractable vomiting with nausea  No date: Kidney stone  2017: Meniere disease      Comment:  Dr TAYLA Mohan  No date: Vertigo    Past Surgical History:  No date: ADENOIDECTOMY  No date: CHOLECYSTECTOMY  12/27/2017: COLONOSCOPY; N/A      Comment:  Procedure: COLONOSCOPY;  Surgeon: Adrian Castillo MD;                Location: [facility];  Service: Endoscopy;  Laterality:                N/A;  1/2013: COLONOSCOPY W/ POLYPECTOMY      Comment:  Dr Hughes  12/26/2016: ERCP      Comment:  [facility] Epic note  1/9/2020: ERCP; N/A      Comment:  Procedure: ERCP (ENDOSCOPIC RETROGRADE                CHOLANGIOPANCREATOGRAPHY);  Surgeon: Adrian Castillo MD;  Location: [facility];  Service: Endoscopy;                 Laterality: N/A;  2/17/2020: ERCP; N/A      Comment:  Procedure: ERCP (ENDOSCOPIC RETROGRADE                CHOLANGIOPANCREATOGRAPHY);  Surgeon: Adrian Castillo MD;  Location: [facility];  Service: Endoscopy;                 Laterality: N/A;  1/2013: ESOPHAGOGASTRODUODENOSCOPY      Comment:  Dr Hughes  2/17/2020: ESOPHAGOGASTRODUODENOSCOPY; N/A      Comment:  Procedure: EGD (ESOPHAGOGASTRODUODENOSCOPY);  Surgeon:                Adrian Castillo MD;  Location: [facility];  " Service:                Endoscopy;  Laterality: N/A;  No date: TONSILLECTOMY  No date: VASECTOMY    Review of patient's family history indicates:  Problem: Heart disease      Relation: Father          Age of Onset: (Not Specified)  Problem: Heart disease      Relation: Mother          Age of Onset: (Not Specified)  Problem: Hyperlipidemia      Relation: Mother          Age of Onset: (Not Specified)  Problem: No Known Problems      Relation: Sister          Age of Onset: (Not Specified)  Problem: Cancer      Relation: Brother          Age of Onset: (Not Specified)          Comment: possible colon cancer  Problem: No Known Problems      Relation: Daughter          Age of Onset: (Not Specified)  Problem: No Known Problems      Relation: Son          Age of Onset: (Not Specified)  Problem: Cancer      Relation: Sister          Age of Onset: (Not Specified)          Comment: breast cancer  Problem: No Known Problems      Relation: Daughter          Age of Onset: (Not Specified)  Problem: No Known Problems      Relation: Son          Age of Onset: (Not Specified)      Social History    Socioeconomic History      Marital status:       Spouse name: Isatu      Number of children: 4      Years of education: 17      Highest education level: Bachelor's degree (e.g., BA, AB, BS)    Occupational History        Employer: [employer]    Tobacco Use      Smoking status: Former Smoker        Packs/day: 0.05        Years: 6.00        Pack years: .3        Start date:         Quit date:         Years since quittin.0      Smokeless tobacco: Never Used    Substance and Sexual Activity      Alcohol use: No      Drug use: Never      Sexual activity: Yes        Partners: Female      Current Outpatient Medications:  azelastine (ASTELIN) 137 mcg (0.1 %) nasal spray, USE 1 SPRAY(S) IN EACH NOSTRIL TWICE DAILY AS NEEDED FOR  RHINITIS, Disp: 30 mL, Rfl: 11  chlorhexidine (PERIDEX) 0.12 % solution, RINSE  WITH 1/2 OZ. ONCE DAILY FOR 5 DAYS, Disp: 473 mL, Rfl: 3  cholestyramine, with sugar, 4 gram Powd, 1-1/2 scoops (6 gram) per day for diarrhea after gallbladder surgery, Disp: 756 g, Rfl: 4  cyanocobalamin, vitamin B-12, 1,000 mcg Subl, Place 1 tablet under the tongue once daily. , Disp: , Rfl:   fluticasone propionate (FLONASE) 50 mcg/actuation nasal spray, Use 2 spray(s) in each nostril once daily, Disp: 48 g, Rfl: 3  ketoconazole (NIZORAL) 2 % cream, Apply topically once daily., Disp: 60 g, Rfl: 0  LOPERAMIDE HCL (IMODIUM A-D ORAL), Take 2 tablets by mouth daily as needed (diarrhea). , Disp: , Rfl:   meclizine (ANTIVERT) 25 mg tablet, Take 1 tablet (25 mg total) by mouth 3 (three) times daily as needed for Dizziness., Disp: 270 tablet, Rfl: 2  omeprazole (PRILOSEC) 40 MG capsule, Take 1 capsule (40 mg total) by mouth 2 (two) times daily before meals., Disp: 180 capsule, Rfl: 3  ondansetron (ZOFRAN-ODT) 8 MG TbDL, DISSOLVE 1 TABLET IN MOUTH EVERY 8 HOURS AS NEEDED, Disp: 20 tablet, Rfl: 0  sucralfate (CARAFATE) 100 mg/mL suspension, Take 10 mLs (1 g total) by mouth 4 (four) times daily as needed., Disp: 1242 mL, Rfl: 1  triamterene-hydrochlorothiazide 37.5-25 mg (MAXZIDE-25) 37.5-25 mg per tablet, TAKE 1/2 TO 1 (ONE-HALF TO ONE) TABLET BY MOUTH ONCE DAILY DURING  MENIERES  EPISODE, Disp: 90 tablet, Rfl: 1    Current Facility-Administered Medications:  acetaminophen tablet 650 mg, 650 mg, Oral, Once PRN, Benny Scruggs MD  albuterol inhaler 2 puff, 2 puff, Inhalation, Q20 Min PRN, Benny Scruggs MD  diphenhydrAMINE injection 25 mg, 25 mg, Intravenous, Once PRN, Benny Scruggs MD  EPINEPHrine (EPIPEN) 0.3 mg/0.3 mL pen injection 0.3 mg, 0.3 mg, Intramuscular, PRN, Benny Scruggs MD  methylPREDNISolone sodium succinate injection 40 mg, 40 mg, Intravenous, Once PRN, Benny Scruggs MD  ondansetron injection 4 mg, 4 mg, Intravenous, Once PRN, Benny Scruggs MD  sodium chloride 0.9% 500 mL  flush bag, , Intravenous, PRN, Benny Scruggs MD, Stopped at 08/04/21 1239  sodium chloride 0.9% flush 10 mL, 10 mL, Intravenous, PRN, Benny Scruggs MD        Review of patient's allergies indicates:   -- Aspirin -- Other (See Comments)    --  Stomach ulcers    Sinus Problem  This is a new problem. The current episode started 1 to 4 weeks ago. The problem has been gradually improving since onset. Associated symptoms include congestion and coughing. Pertinent negatives include no chills, diaphoresis or sore throat. Past treatments include nothing. The treatment provided no relief.       Constitution: Negative for chills, sweating, fatigue and fever.   HENT: Positive for congestion and postnasal drip. Negative for sore throat.    Respiratory: Positive for cough.    Neurological: Negative.        Objective:      Physical Exam   Constitutional: He is oriented to person, place, and time.  Non-toxic appearance. No distress.   HENT:   Head: Normocephalic and atraumatic.   Cardiovascular: Normal rate.   Pulmonary/Chest: Effort normal. No respiratory distress.   Abdominal: Normal appearance.   Neurological: no focal deficit. He is alert and oriented to person, place, and time.   Psychiatric: His behavior is normal. Mood normal.         Assessment:       1. Cough          Plan:       Rapid Covid 19 test collected and resulted negative. Results discussed with patient, advised to follow up for any further concerns.   Retesting discussed. Symptomatic treatment discussed.      Cough  -     SARS Coronavirus 2 Antigen, POCT Manual Read

## 2022-05-31 ENCOUNTER — PATIENT MESSAGE (OUTPATIENT)
Dept: ADMINISTRATIVE | Facility: HOSPITAL | Age: 67
End: 2022-05-31
Payer: MEDICARE

## 2022-08-17 DIAGNOSIS — I10 ESSENTIAL HYPERTENSION: ICD-10-CM
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
